# Patient Record
Sex: MALE | Race: ASIAN | NOT HISPANIC OR LATINO | Employment: UNEMPLOYED | URBAN - METROPOLITAN AREA
[De-identification: names, ages, dates, MRNs, and addresses within clinical notes are randomized per-mention and may not be internally consistent; named-entity substitution may affect disease eponyms.]

---

## 2017-05-01 ENCOUNTER — OFFICE VISIT (OUTPATIENT)
Dept: FAMILY MEDICINE | Facility: CLINIC | Age: 67
End: 2017-05-01

## 2017-05-01 VITALS
WEIGHT: 146 LBS | DIASTOLIC BLOOD PRESSURE: 83 MMHG | HEART RATE: 76 BPM | TEMPERATURE: 97.6 F | BODY MASS INDEX: 26.36 KG/M2 | SYSTOLIC BLOOD PRESSURE: 129 MMHG

## 2017-05-01 DIAGNOSIS — Z00.00 ROUTINE GENERAL MEDICAL EXAMINATION AT A HEALTH CARE FACILITY: ICD-10-CM

## 2017-05-01 DIAGNOSIS — M1A.0710 CHRONIC GOUT OF RIGHT FOOT, UNSPECIFIED CAUSE: ICD-10-CM

## 2017-05-01 DIAGNOSIS — M1A.0710 CHRONIC IDIOPATHIC GOUT INVOLVING TOE OF RIGHT FOOT WITHOUT TOPHUS: Primary | ICD-10-CM

## 2017-05-01 DIAGNOSIS — R05.9 COUGH: ICD-10-CM

## 2017-05-01 LAB
BUN SERPL-MCNC: 19.1 MG/DL (ref 7–21)
CALCIUM SERPL-MCNC: 9.3 MG/DL (ref 8.5–10.1)
CHLORIDE SERPLBLD-SCNC: 106.4 MMOL/L (ref 98–110)
CHOLEST SERPL-MCNC: 180.1 MG/DL (ref 0–200)
CHOLEST/HDLC SERPL: 3.5 {RATIO} (ref 0–5)
CO2 SERPL-SCNC: 27.3 MMOL/L (ref 20–32)
CREAT SERPL-MCNC: 1 MG/DL (ref 0.7–1.3)
GFR SERPL CREATININE-BSD FRML MDRD: 79.7 ML/MIN/1.7 M2
GLUCOSE SERPL-MCNC: 96.9 MG'DL (ref 70–99)
HBA1C MFR BLD: 5.6 % (ref 4.1–5.7)
HDLC SERPL-MCNC: 52 MG/DL
LDLC SERPL CALC-MCNC: 106 MG/DL (ref 0–129)
POTASSIUM SERPL-SCNC: 3.9 MMOL/DL (ref 3.2–4.6)
SODIUM SERPL-SCNC: 145.6 MMOL/L (ref 132–142)
TRIGL SERPL-MCNC: 111.8 MG/DL (ref 0–150)
URATE SERPL-MCNC: 6.9 MG/DL (ref 3–8)
VLDL CHOLESTEROL: 22.4 MG/DL (ref 7–32)

## 2017-05-01 RX ORDER — PREDNISONE 10 MG/1
TABLET ORAL
Qty: 21 TABLET | Refills: 0 | Status: SHIPPED | OUTPATIENT
Start: 2017-05-01 | End: 2017-05-09

## 2017-05-01 RX ORDER — ALLOPURINOL 300 MG/1
300 TABLET ORAL DAILY
Qty: 90 TABLET | Refills: 11 | Status: SHIPPED | OUTPATIENT
Start: 2017-05-01 | End: 2017-05-09

## 2017-05-01 NOTE — MR AVS SNAPSHOT
After Visit Summary   5/1/2017    Cristina Lyles    MRN: [id number]           Patient Information     Date Of Birth          1950        Visit Information        Provider Department      5/1/2017 10:00 AM Ronaldo Wu MD [facility]        Today's Diagnoses     Chronic idiopathic gout involving toe of right foot without tophus    -  1    Routine general medical examination at a health care facility        Cough        Chronic gout of right foot, unspecified cause          Care Instructions      HMONG  Noj los Tiv Thaiv gout  Gout yog ib tug mob hauv daim ntawv ntawm viki mob caj dab tshwm sim los ntawm ib tug dhau heev lawm ntawm uric acid. Qhov no yog ib tug pov tseg cov khoom ua los ntawm lub cev. Nws ua li nyob miguel hauv lub cev thiab cov ntaub ntawv muaju uas vern nyob miguel hauv cov pob qij txha, nqa miguel ib tug gout nres. Haus dej cawv thiab adam yam zaub mov yuav ua miguel muaj mob ib tug gout nres. Hauv qab no yog ib co txheej txheem miguel viki hloov koj cov viki noj haus kom pab koj tswj gout. Koj tus kws carlita mob yuav ua hauj lwm nrog koj los mus txiav txim qhov zoo tshaj plaws noj ursula viki miguel koj. Paub hais tias noj cov zaub mov yog tsuas yog ib feem ntawm viki tswj gout. Noj koj cov tshuaj raws li tus kws carlita thiab ua raws li lwm cov viki ivonne koj tus kws carlita mob tau muab miguel koj.  Cov zaub mov tsis txhob  Noj ntau yam zaub mov uas muaj purines yuav ua miguel kom cov theem ntawm uric acid nyob miguel hauv koj lub cev thiab ua miguel kom koj muaj ib tug gout nres. Adam zaum nws yuav zoo tshaj plaws kom tsis txhob no high-purine zaub mov:  Haus dej cawv (beer, liab wine). Adam zaum koj yuav tsum tau hais kom tsis txhob haus juve haus cawv ingris conklin.  Adam yam ntses (anchovies, sardines, ntses roes, herring, tuna, qwj nplais, cov ntses pas thu, scallops, trout, thiab Ramonita)  Adam yam nqaij (nqaij liab, ua nqaij, nqaij npuas kib, qaib cov txwv, cov tsiaj qus si, thiab trixie)  Kua ntsw thiab gravies ua nrog nqaij  Hloov  nqaij (xws li daim siab, ob lub raum, sweetbreads, thiab tripe)  Legumes (xws li noob taum qhuav, taum mog)  Nceb, spinach, asparagus, thiab zaub paj  Poov xab thiab cov poov xab extract tshuaj  Cov zaub mov sim  Ib txhia cov zaub mov adam zaum yuav pab tau miguel cov neeg uas muaj gout. Adam zaum koj yuav xav mus sim ntxiv ib co ntawm cov nram qab no cov khoom noj miguel koj noj:  Tsaus berries: Cov no muaj xws blueberries, blackberries, thiab txiv ntoo qab zib. Cov berries muaj tshuaj uas adam zaum yuav txo tau uric acid.  Taum paj: taum paj, uas yog tsim los ntawm cov kua, yog ib tug zoo qhov chaw ntawm cov protein. Viki tshawb fawb pom tau hais tias adam zaum nws yuav tsum yog ib tug zoo xaiv tshaj nqaij miguel cov neeg uas muaj gout.  Omega fatty acids: Cov acids uas yog nyob miguel hauv fatty ntses (xws li salmon), adam roj (xws li flax, roj txiv, los yog cov noob txiv roj), los yog ceev. Adam zaum lawv yuav pab tiv thaiv kom txhob mob vim gout.  Cov nram qab no raws li viki ivonne yog pom zoo los ntawm [employer] Association miguel cov neeg uas gout. Koj noj cov zaub mov yuav tsum tau:  High nyob miguel hauv fiber, whole grains, txiv hmab txiv ntoo, thiab zaub.  Tsis tshua muaj nyob miguel hauv protein (15% ntawm calorie ntau ntau yuav tsum tau tuaj los ntawm protein. Xaiv ntshiv qhov chaw xws li kua, nqaij ntshiv, thiab nqaij qaib).  Tsis tshua muaj nyob miguel hauv roj (tsis muaj ntau tshaj li 30% ntawm cov calories yuav tsum tau tuaj los ntawm cov jolie, nrog xwb 10% los ntawm cov tsiaj roj).    Eating to Prevent Gout  Gout is a painful form of arthritis caused by an excess of uric acid. This is a waste product made by the body. It builds up in the body and forms crystals that collect in the joints, bringing on a gout attack. Alcohol and certain foods can trigger a gout attack. Below are some guidelines for changing your diet to help you manage gout. Your healthcare provider can work with you to determine the best eating plan for you.  Know that diet is only one part of managing gout. Take your medicines as prescribed and follow the other guidelines your healthcare provider has given you.  Foods to limit  Eating too many foods containing purines may increase the levels of uric acid in your body and increase your risk for a gout attack. It may be best to limit these high-purine foods:    Alcohol (beer, red wine). You may be told to avoid alcohol completely.    Certain fish (anchovies, sardines, fish roes, herring, tuna, mussels, codfish, scallops, trout, and elma)    Certain meats (red meat, processed meat, lee, turkey, wild game, and goose)    Sauces and gravies made with meat    Organ meats (such as liver, kidneys, sweetbreads, and tripe)    Legumes (such as dried beans, peas)    Mushrooms, spinach, asparagus, and cauliflower    Yeast and yeast extract supplements  Foods to try  Some foods may be helpful for people with gout. You may want to try adding some of the following foods to your diet:    Dark berries: These include blueberries, blackberries, and cherries. These berries contain chemicals that may lower uric acid.    Tofu: Tofu, which is made from soy, is a good source of protein. Studies have shown that it may be a better choice than meat for people with gout.    Omega fatty acids: These acids are found in fatty fish (such as salmon), certain oils (such as flax, olive, or nut oils), or nuts. They may help prevent inflammation due to gout.  The following guidelines are recommended by the American Medical Association for people with gout. Your diet should be:    High in fiber, whole grains, fruits, and vegetables.    Low in protein (15% of calories should come from protein. Choose lean sources such as soy, lean meats, and poultry).    Low in fat (no more than 30% of calories should come from fat, with only 10% coming from animal fat).     2236-0410 [employer]. [address] [place] [address]. All rights  reserved. This information is not intended as a substitute for professional medical care. Always follow your healthcare professional's instructions.      Follow up with your doctor in the next few months or sooner if needed.  We will let you know the results of your lab tests by mail.          Follow-ups after your visit        Follow-up notes from your care team     Return in about 3 months (around 2017), or if symptoms worsen or fail to improve.      Who to contact     Please call your clinic at [phone] to:    Ask questions about your health    Make or cancel appointments    Discuss your medicines    Learn about your test results    Speak to your doctor   If you have compliments or concerns about an experience at your clinic, or if you wish to file a complaint, please contact [facility] Patient Relations at [phone] or email us at [email or web address]         Additional Information About Your Visit        Always Preppedhart Information     Dodonation is an electronic gateway that provides easy, online access to your medical records. With Dodonation, you can request a clinic appointment, read your test results, renew a prescription or communicate with your care team.     To sign up for Dodonation visit the website at [email or web address]   You will be asked to enter the access code listed below, as well as some personal information. Please follow the directions to create your username and password.     Your access code is: [email or web address]  Expires: 2017 11:03 AM     Your access code will  in 90 days. If you need help or a new code, please contact your [facility] or call [phone] for assistance.        Care EveryWhere ID     This is your Care EveryWhere ID. This could be used by other organizations to access your [place] medical records  [id number]        Your Vitals Were     Pulse Temperature BMI (Body Mass Index)             76  97.6  F (36.4  C) (Oral) 26.36 kg/m2          Blood Pressure from Last 3 Encounters:   05/01/17 129/83   12/26/16 103/69   06/08/16 134/75    Weight from Last 3 Encounters:   05/01/17 146 lb (66.2 kg)   12/26/16 137 lb 12.8 oz (62.5 kg)   06/08/16 142 lb (64.4 kg)              We Performed the Following     Basic Metabolic Panel ([place])     Hemoglobin A1c ([facility])     Lipid Panel ([facility])     Uric Acid ([facility])          Today's Medication Changes          These changes are accurate as of: 5/1/17 11:03 AM.  If you have any questions, ask your nurse or doctor.               Start taking these medicines.        Dose/Directions    acetaminophen-codeine 300-30 MG per tablet   Commonly known as:  TYLENOL #3   Used for:  Chronic idiopathic gout involving toe of right foot without tophus   Started by:  Ronaldo Wu MD        Dose:  1-2 tablet   Take 1-2 tablets by mouth every 4 hours as needed for pain maximum 6 tablet(s) per day   Quantity:  12 tablet   Refills:  0       predniSONE 10 MG tablet   Commonly known as:  DELTASONE   Used for:  Chronic idiopathic gout involving toe of right foot without tophus   Started by:  Ronaldo Wu MD        Take 4 tabs (40 mg) by mouth daily x 2 days, 3 tabs (30 mg) daily x 2 days, 2 tabs (20 mg) daily x 2 days, then 1 tab (10 mg) x 2 days. 0.5 tab daily x 2 days then stop.   Quantity:  21 tablet   Refills:  0         Stop taking these medicines if you haven't already. Please contact your care team if you have questions.     colchicine 0.6 MG tablet   Commonly known as:  COLCRYS   Stopped by:  Ronaldo Wu MD           guaiFENesin-dextromethorphan 100-10 MG/5ML syrup   Commonly known as:  ROBITUSSIN DM   Stopped by:  Ronaldo Wu MD                Where to get your medicines      These medications were sent to [facility] - [place], [place] - [address]  [address], [place] [address]     Phone:  [phone]     allopurinol 300 MG tablet     ranitidine 150 MG tablet         Some of these will need a paper prescription and others can be bought over the counter.  Ask your nurse if you have questions.     Bring a paper prescription for each of these medications     acetaminophen-codeine 300-30 MG per tablet    predniSONE 10 MG tablet                Primary Care Provider Office Phone # Fax #    Cintia Ortiz [phone]       [facility] [address] [place] [address]        Thank you!     Thank you for choosing [facility]  for your care. Our goal is always to provide you with excellent care. Hearing back from our patients is one way we can continue to improve our services. Please take a few minutes to complete the written survey that you may receive in the mail after your visit with us. Thank you!             Your Updated Medication List - Protect others around you: Learn how to safely use, store and throw away your medicines at www.disposemymeds.org.          This list is accurate as of: 5/1/17 11:03 AM.  Always use your most recent med list.                   Brand Name Dispense Instructions for use    acetaminophen 325 MG tablet    TYLENOL    100 tablet    Take 2 tablets (650 mg) by mouth every 6 hours as needed for mild pain Do not take more than 3000 mg in one day.       acetaminophen-codeine 300-30 MG per tablet    TYLENOL #3    12 tablet    Take 1-2 tablets by mouth every 4 hours as needed for pain maximum 6 tablet(s) per day       allopurinol 300 MG tablet    ZYLOPRIM    90 tablet    Take 1 tablet (300 mg) by mouth daily       predniSONE 10 MG tablet    DELTASONE    21 tablet    Take 4 tabs (40 mg) by mouth daily x 2 days, 3 tabs (30 mg) daily x 2 days, 2 tabs (20 mg) daily x 2 days, then 1 tab (10 mg) x 2 days. 0.5 tab daily x 2 days then stop.       ranitidine 150 MG tablet    ZANTAC    60 tablet    Take 1 tablet (150 mg) by mouth 2 times daily

## 2017-05-01 NOTE — PROGRESS NOTES
SUBJECTIVE:  This is a 66-year-old man who attends with right foot pain, which he knows to be a flare-up of his gout.  He has had gout for many years and says that he gets flareups three or four times per year.  He attended [facility] ER two days ago and says the medication he was given didn't help.  He was able to locate an old prescription of prednisone and took two of these, and this allowed him to sleep for the first time in a few days.  He otherwise feels well.  He sometimes gets pains in his knee and foot, but most commonly it is in the right great toe.  He is agreeable to having labs checked.  We discussed allopurinol.  He remembers this name and indicates that he ran out of refills and then stopped taking it, but he is agreeable to the idea of taking something on a daily basis to prevent future recurrences of gout.  With his permission, we reviewed Care Everywhere.   OBJECTIVE:   Vital signs are noted.  His blood pressure is satisfactory.  He has an acutely tender, warm, and swollen right great toe.  I Googled the medication that he believes is prednisone and verified that it is prednisone 10 mg pills.  I reviewed his records and confirmed that he doesn't have diabetes and has good renal function, but labs haven't been checked in over a year.   ASSESSMENT:   1.  Chronic gout.   2.  Acute gout flareup unresponsive to treatment.   PLAN:  It seems as if he was given a single dose of colchicine and a single dose of allopurinol in the ER, bilaterally, which is an unusual treatment.  I elected to give him a reducing course of prednisone in a fashion that he says has worked well for him in the past.  I offered to give him adjuvant pain relief for the first day or two and he is agreeable to this.  He tends to have upper GI symptoms and is taking ranitidine.  I therefore avoided use of an NSAID and we'll add this to his allergy list as a caution.  We'll check a number of labs and I'll notify him of the results.  I  went through dietary recommendations to avoid gout flareups and have sent a copy of this home with him written in the Hmong language.  He was satisfied with this plan.  He is encouraged to followup with his usual primary care physician within the next few months or sooner p.r.n.

## 2017-05-01 NOTE — NURSING NOTE
No height today, due to foot pain    I spent a total of 30 minutes interpret for this patient today.

## 2017-05-01 NOTE — PATIENT INSTRUCTIONS
HMONG  Noj los Tiv Thaiv gout  Gout yog ib tug mob hauv daim ntawv ntawm viki mob caj dab tshwm sim los ntawm ib tug dhau heev lawm ntawm uric acid. Qhov no yog ib tug pov tseg cov khoom ua los ntawm lub cev. Nws ua li nyob miguel hauv lub cev thiab cov ntaub ntawv muaju uas vern nyob miguel hauv cov pob qij txha, nqa miguel ib tug gout nres. Haus dej cawv thiab adam yam zaub mov yuav ua miguel muaj mob ib tug gout nres. Hauv qab no yog ib co txheej txheem miguel viki hloov koj cov viki noj haus kom pab koj tswj gout. Koj tus kws carlita mob yuav ua hauj lwm nrog koj los mus txiav txim qhov zoo tshaj plaws noj ursula viki miguel koj. Paub hais tias noj cov zaub mov yog tsuas yog ib feem ntawm viki tswj gout. Noj koj cov tshuaj raws li tus kws carlita thiab ua raws li lwm cov viki ivonne koj tus kws carlita mob tau muab miguel koj.  Cov zaub mov tsis txhob  Noj ntau yam zaub mov uas muaj purines yuav ua miguel kom cov theem ntawm uric acid nyob miguel hauv koj lub cev thiab ua miguel kom koj muaj ib tug gout nres. Adam zaum nws yuav zoo tshaj plaws kom tsis txhob no high-purine zaub mov:  Haus dej cawv (beer, liab wine). Adam zaum koj yuav tsum tau hais kom tsis txhob haus juve haus cawv kiag li.  Adam yam ntses (anchovies, sardines, ntses roes, herring, tuna, qwj nplais, cov ntses pas thu, scallops, trout, thiab Ramonita)  Adam yam nqaij (nqaij liab, ua nqaij, nqaij npuas kib, qaib cov txwv, cov tsiaj qus si, thiab trixie)  Kua ntsw thiab gravies ua nrog nqaij  Hloov nqaij (xws li daim siab, ob lub raum, sweetbreads, thiab tripe)  Legumes (xws li noob taum qhuav, taum mog)  Nceb, spinach, asparagus, thiab zaub paj  Poov xab thiab cov poov xab extract tshuaj  Cov zaub mov sim  Ib txhia cov zaub mov adam zaum yuav pab tau miguel cov neeg uas muaj gout. Adam zaum koj yuav xav mus sim ntxiv ib co ntawm cov nram qab no cov khoom noj miguel koj noj:  Tsaus berries: Cov no muaj xws blueberries, blackberries, thiab txiv ntoo qab zib. Cov berries muaj tshuaj uas adam zaum yuav txo tau uric  acid.  Taum paj: taum paj, uas yog tsim los ntawm cov kua, yog ib tug zoo qhov chaw ntawm cov protein. Viki tshawb fawb pom tau hais tias adam zaum nws yuav tsum yog ib tug zoo xaiv tshaj nqaij miguel cov neeg uas muaj gout.  Omega fatty acids: Cov acids uas yog nyob miguel hauv fatty ntses (xws li salmon), adam roj (xws li flax, roj txiv, los yog cov noob txiv roj), los yog ceev. Adam zaum lawv yuav pab tiv thaiv kom txhob mob vim gout.  Cov nram qab no raws li viki ivonne yog pom zoo los ntawm American [employer] Association miguel cov neeg uas gout. Koj noj cov zaub mov yuav tsum tau:  High nyob miguel hauv fiber, whole grains, txiv hmab txiv ntoo, thiab zaub.  Tsis tshua muaj nyob miguel hauv protein (15% ntawm calorie ntau ntau yuav tsum tau tuaj los ntawm protein. Xaiv ntshiv qhov chaw xws li kua, nqaij ntshiv, thiab nqaij qaib).  Tsis tshua muaj nyob miguel hauv roj (tsis muaj ntau tshaj li 30% ntawm cov calories yuav tsum tau tuaj los ntawm cov jolie, nrog xwb 10% los ntawm cov tsiaj roj).    Eating to Prevent Gout  Gout is a painful form of arthritis caused by an excess of uric acid. This is a waste product made by the body. It builds up in the body and forms crystals that collect in the joints, bringing on a gout attack. Alcohol and certain foods can trigger a gout attack. Below are some guidelines for changing your diet to help you manage gout. Your healthcare provider can work with you to determine the best eating plan for you. Know that diet is only one part of managing gout. Take your medicines as prescribed and follow the other guidelines your healthcare provider has given you.  Foods to limit  Eating too many foods containing purines may increase the levels of uric acid in your body and increase your risk for a gout attack. It may be best to limit these high-purine foods:    Alcohol (beer, red wine). You may be told to avoid alcohol completely.    Certain fish (anchovies, sardines, fish roes, herring, tuna, mussels, codfish,  scallops, trout, and elma)    Certain meats (red meat, processed meat, lee, turkey, wild game, and goose)    Sauces and gravies made with meat    Organ meats (such as liver, kidneys, sweetbreads, and tripe)    Legumes (such as dried beans, peas)    Mushrooms, spinach, asparagus, and cauliflower    Yeast and yeast extract supplements  Foods to try  Some foods may be helpful for people with gout. You may want to try adding some of the following foods to your diet:    Dark berries: These include blueberries, blackberries, and cherries. These berries contain chemicals that may lower uric acid.    Tofu: Tofu, which is made from soy, is a good source of protein. Studies have shown that it may be a better choice than meat for people with gout.    Omega fatty acids: These acids are found in fatty fish (such as salmon), certain oils (such as flax, olive, or nut oils), or nuts. They may help prevent inflammation due to gout.  The following guidelines are recommended by the American Medical Association for people with gout. Your diet should be:    High in fiber, whole grains, fruits, and vegetables.    Low in protein (15% of calories should come from protein. Choose lean sources such as soy, lean meats, and poultry).    Low in fat (no more than 30% of calories should come from fat, with only 10% coming from animal fat).     5302-0885 [employer]. [address] [place] [address]. All rights reserved. This information is not intended as a substitute for professional medical care. Always follow your healthcare professional's instructions.      Follow up with your doctor in the next few months or sooner if needed.  We will let you know the results of your lab tests by mail.

## 2017-05-09 ENCOUNTER — OFFICE VISIT (OUTPATIENT)
Dept: FAMILY MEDICINE | Facility: CLINIC | Age: 67
End: 2017-05-09

## 2017-05-09 VITALS
OXYGEN SATURATION: 100 % | HEART RATE: 69 BPM | SYSTOLIC BLOOD PRESSURE: 137 MMHG | HEIGHT: 62 IN | DIASTOLIC BLOOD PRESSURE: 81 MMHG | BODY MASS INDEX: 26.39 KG/M2 | WEIGHT: 143.4 LBS | TEMPERATURE: 97.4 F

## 2017-05-09 DIAGNOSIS — M1A.0710 CHRONIC GOUT OF RIGHT FOOT, UNSPECIFIED CAUSE: ICD-10-CM

## 2017-05-09 RX ORDER — COLCHICINE 0.6 MG/1
0.6 TABLET ORAL DAILY
Qty: 30 TABLET | Refills: 5 | Status: SHIPPED | OUTPATIENT
Start: 2017-05-09 | End: 2018-01-22

## 2017-05-09 RX ORDER — PREDNISONE 20 MG/1
TABLET ORAL
Qty: 45 TABLET | Refills: 0 | Status: SHIPPED | OUTPATIENT
Start: 2017-05-09 | End: 2017-05-26

## 2017-05-09 RX ORDER — ALLOPURINOL 300 MG/1
300 TABLET ORAL DAILY
Qty: 90 TABLET | Refills: 11 | Status: SHIPPED | OUTPATIENT
Start: 2017-05-09 | End: 2018-01-22

## 2017-05-09 NOTE — PROGRESS NOTES
"    Nursing Notes:   Kimberli Yoon CMA  5/9/2017  8:32 AM  Signed   name: Liang Ambrosio  Language: Yapta  Agency: [employer]  Phone number: [phone]    Chief Complaint   Patient presents with     Arthritis     gout in right big toe for 1 week has not gotten better     Blood pressure 137/81, pulse 69, temperature 97.4  F (36.3  C), height 5' 2.21\" (158 cm), weight 143 lb 6.4 oz (65 kg), SpO2 100 %.    SUBJECTIVE:  This is a 66-year-old gentleman, well-known to me, w/history of anxiety, depression, and gout.  He is seen today with the assistance of [facility] , J Carlos Ambrosio.  He is here for followup on his right foot pain.  He had an acute gouty attack about two weeks ago.  He was seen initially at [facility].  He was given colchicine and prednisone in a single dose.  He was seen here by Dr. Wu and was given a more prolonged course of prednisone.  Mr. Lyles endorses today that the pain in his foot is improving.  He doesn't feel that it is quite as strong as it should be.  He was initially taking four tablets of 10 mg prednisone, but he found that this wasn't helping.  He has now completed the full course as prescribed by Dr. Wu.  He now is able to walk again.  Swelling and redness have gone down, but he is still having some aching and significant pain.  He has been off of the crutches and the brace, as well as the Tylenol #3.  He describes these episodes as feeling like he develops a kind of irritation or tingling that occurs in the toe first.  Symptoms then progress toward increased pain, swelling, and discomfort.  He describes it as feeling that there are crystals forming in his toe.  When he is able to take prednisone, the symptoms improve, but without the medicine he feels that he can't do anything.  It appears today that he wasn't aware of his diagnosis of gout.  He had been told that there were different foods that he should avoid, but the list that he was given at the ER was in " "English.  He does remember that he is supposed to avoid alcohol and fatty or greasy foods.  We discussed other foods that have been difficult for him with the gout.  These include seafood, fish, and pork.  We discussed other foods that can lead to higher uric acid levels, as well as foods that can decrease them.     He is interested in starting a regular every day medicine to prevent gout.  He has been on allopurinol in the past.  Apparently, when he was on that his symptoms were much better; instead of having very regular gouty exacerbations, he had them only a couple of times a year.  He knows to take the colchicine with the allopurinol.  He would like to have prednisone available at home so he can take that at the beginning of any symptoms of gout.       We discussed his labs that were checked at last visit.  They showed a mildly elevated sodium, and normal creatinine of 1.0, which is stable.  His cholesterol showed an LDL of 106.  A1C is 5.6, with a random glucose of 96.9.  Uric acid level was 6.9.     Objective:  /81  Pulse 69  Temp 97.4  F (36.3  C)  Ht 5' 2.21\" (158 cm)  Wt 143 lb 6.4 oz (65 kg)  SpO2 100%  BMI 26.06 kg/m2   Vitals:  Vitals are reviewed and are within the normal range  Gen:  Alert, pleasant, no acute distress  Cardiac:  Regular rate and rhythm, no murmurs, rubs or gallops  Respiratory:  Lungs clear to auscultation bilaterally  Extremities:  Warm, well-perfused, pulses 2+/4, no lower extremity edema.. Some hyperpigmentaion around right great toe, but not warm or erythematous.  Moderate pain with palpation and full range of motion.      ASSESSMENT/PLAN:  A 66-year-old male w/history of gout, presenting with continued right toe pain, although it is significantly improved from his presentation last week.  We reviewed information about gout, including dietary and lifestyle changes.  We discussed the regular controller medications for gout, which for him will be allopurinol and " colchicine.  He'll take the two medications together every day to prevent symptoms.  We also discussed prednisone as a medicine that he should take if he started to have trouble with the gout.  I designed a prednisone taper for him that he can initiate at home, but I requested that he call us if he has to take the medication.  We'll have him take 20 mg for one more week NOW  to get this toe fairly calmed down.  He'll follow up with me in 2-3 weeks to recheck his uric acid level, as well as to review proper dosing of his gout medications.  I'll talk with PharmD about options for gout action plan, as he seems like a good candidate for this going forward.     I spent 30 min with patient >50% on counseling and coordination of care.          Patient Instructions   1)  Take 1 pill of the allopurinol and 1 pill of the colchicine EVERY DAY.    2)  At the start of a gout attack, take 2 pills daily for 1 week, then 1 pill daily for 1 week.    3)  NOW-  Take 1 pill daily for 5 days.      Follow up in 1-2 week and bring your medication so we can review the medications and the plan.  Will check blood levels that day.

## 2017-05-09 NOTE — NURSING NOTE
name: Liang Ambrosio  Language: Hmong  Agency: [facility]  Phone number: [phone]

## 2017-05-09 NOTE — PATIENT INSTRUCTIONS
1)  Take 1 pill of the allopurinol and 1 pill of the colchicine EVERY DAY.    2)  At the start of a gout attack, take 2 pills daily for 1 week, then 1 pill daily for 1 week.    3)  NOW-  Take 1 pill daily for 5 days.      Follow up in 1-2 week and bring your medication so we can review the medications and the plan.  Will check blood levels that day.

## 2017-05-09 NOTE — MR AVS SNAPSHOT
After Visit Summary   2017    Cristina Lyles    MRN: [id number]           Patient Information     Date Of Birth          1950        Visit Information        Provider Department      2017 8:20 AM Cintia Ortiz MD [facility]        Today's Diagnoses     Chronic gout of right foot, unspecified cause          Care Instructions    1)  Take 1 pill of the allopurinol and 1 pill of the colchicine EVERY DAY.    2)  At the start of a gout attack, take 2 pills daily for 1 week, then 1 pill daily for 1 week.    3)  NOW-  Take 1 pill daily for 5 days.      Follow up in 1-2 week and bring your medication so we can review the medications and the plan.  Will check blood levels that day.          Follow-ups after your visit        Who to contact     Please call your clinic at [phone] to:    Ask questions about your health    Make or cancel appointments    Discuss your medicines    Learn about your test results    Speak to your doctor   If you have compliments or concerns about an experience at your clinic, or if you wish to file a complaint, please contact [facility] Patient Relations at [phone] or email us at [email or web address]         Additional Information About Your Visit        MyChart Information     [employer] is an electronic gateway that provides easy, online access to your medical records. With [employer], you can request a clinic appointment, read your test results, renew a prescription or communicate with your care team.     To sign up for [employer] visit the website at [email or web address]   You will be asked to enter the access code listed below, as well as some personal information. Please follow the directions to create your username and password.     Your access code is: [email or web address]  Expires: 2017 11:03 AM     Your access code will  in 90 days. If you need help or a new code, please contact your [facility]  "Physicians Clinic or call [phone] for assistance.        Care EveryWhere ID     This is your Care EveryWhere ID. This could be used by other organizations to access your [place] medical records  [id number]        Your Vitals Were     Pulse Temperature Height Pulse Oximetry BMI (Body Mass Index)       69 97.4  F (36.3  C) 5' 2.21\" (158 cm) 100% 26.06 kg/m2        Blood Pressure from Last 3 Encounters:   05/09/17 137/81   05/01/17 129/83   12/26/16 103/69    Weight from Last 3 Encounters:   05/09/17 143 lb 6.4 oz (65 kg)   05/01/17 146 lb (66.2 kg)   12/26/16 137 lb 12.8 oz (62.5 kg)              Today, you had the following     No orders found for display         Today's Medication Changes          These changes are accurate as of: 5/9/17  9:11 AM.  If you have any questions, ask your nurse or doctor.               Start taking these medicines.        Dose/Directions    colchicine 0.6 MG tablet   Commonly known as:  COLCRYS   Used for:  Chronic gout of right foot, unspecified cause   Started by:  Cintia Ortiz MD        Dose:  0.6 mg   Take 1 tablet (0.6 mg) by mouth daily   Quantity:  30 tablet   Refills:  5         These medicines have changed or have updated prescriptions.        Dose/Directions    predniSONE 20 MG tablet   Commonly known as:  DELTASONE   This may have changed:    - medication strength  - additional instructions   Used for:  Chronic gout of right foot, unspecified cause   Changed by:  Cintia Ortiz MD        At start of gout attack, takes 2 pills daily for 1 week then 1 pill daily for 1 week.   Quantity:  45 tablet   Refills:  0         Stop taking these medicines if you haven't already. Please contact your care team if you have questions.     acetaminophen-codeine 300-30 MG per tablet   Commonly known as:  TYLENOL #3   Stopped by:  Cintia Ortiz MD                Where to get your medicines      These medications were sent to [facility] - [place], [place] - " [address]  [address] [place] [address]     Phone:  [phone]     allopurinol 300 MG tablet    colchicine 0.6 MG tablet    predniSONE 20 MG tablet                Primary Care Provider Office Phone # Fax #    Cintia Ortiz -[phone]       [facility] [address] [place] [address]        Thank you!     Thank you for choosing [facility]  for your care. Our goal is always to provide you with excellent care. Hearing back from our patients is one way we can continue to improve our services. Please take a few minutes to complete the written survey that you may receive in the mail after your visit with us. Thank you!             Your Updated Medication List - Protect others around you: Learn how to safely use, store and throw away your medicines at www.disposemymeds.org.          This list is accurate as of: 5/9/17  9:11 AM.  Always use your most recent med list.                   Brand Name Dispense Instructions for use    acetaminophen 325 MG tablet    TYLENOL    100 tablet    Take 2 tablets (650 mg) by mouth every 6 hours as needed for mild pain Do not take more than 3000 mg in one day.       allopurinol 300 MG tablet    ZYLOPRIM    90 tablet    Take 1 tablet (300 mg) by mouth daily       colchicine 0.6 MG tablet    COLCRYS    30 tablet    Take 1 tablet (0.6 mg) by mouth daily       predniSONE 20 MG tablet    DELTASONE    45 tablet    At start of gout attack, takes 2 pills daily for 1 week then 1 pill daily for 1 week.       ranitidine 150 MG tablet    ZANTAC    60 tablet    Take 1 tablet (150 mg) by mouth 2 times daily

## 2017-05-25 DIAGNOSIS — M1A.0790 IDIOPATHIC CHRONIC GOUT OF FOOT WITHOUT TOPHUS, UNSPECIFIED LATERALITY: Primary | ICD-10-CM

## 2017-05-26 ENCOUNTER — OFFICE VISIT (OUTPATIENT)
Dept: FAMILY MEDICINE | Facility: CLINIC | Age: 67
End: 2017-05-26

## 2017-05-26 ENCOUNTER — OFFICE VISIT (OUTPATIENT)
Dept: PHARMACY | Facility: CLINIC | Age: 67
End: 2017-05-26

## 2017-05-26 VITALS
BODY MASS INDEX: 26.2 KG/M2 | TEMPERATURE: 97.8 F | WEIGHT: 144.2 LBS | OXYGEN SATURATION: 98 % | SYSTOLIC BLOOD PRESSURE: 124 MMHG | DIASTOLIC BLOOD PRESSURE: 73 MMHG | HEART RATE: 81 BPM

## 2017-05-26 DIAGNOSIS — M1A.0790 IDIOPATHIC CHRONIC GOUT OF FOOT WITHOUT TOPHUS, UNSPECIFIED LATERALITY: Primary | ICD-10-CM

## 2017-05-26 DIAGNOSIS — M1A.0790 IDIOPATHIC CHRONIC GOUT OF FOOT WITHOUT TOPHUS, UNSPECIFIED LATERALITY: ICD-10-CM

## 2017-05-26 LAB
BUN SERPL-MCNC: 25.5 MG/DL (ref 7–21)
CALCIUM SERPL-MCNC: 8.6 MG/DL (ref 8.5–10.1)
CHLORIDE SERPLBLD-SCNC: 105.5 MMOL/L (ref 98–110)
CO2 SERPL-SCNC: 25.9 MMOL/L (ref 20–32)
CREAT SERPL-MCNC: 1.2 MG/DL (ref 0.7–1.3)
GFR SERPL CREATININE-BSD FRML MDRD: 64.6 ML/MIN/1.7 M2
GLUCOSE SERPL-MCNC: 168 MG'DL (ref 70–99)
POTASSIUM SERPL-SCNC: 3.7 MMOL/DL (ref 3.2–4.6)
SODIUM SERPL-SCNC: 139.7 MMOL/L (ref 132–142)
URATE SERPL-MCNC: 9.3 MG/DL (ref 3–8)

## 2017-05-26 RX ORDER — NAPROXEN 500 MG/1
500 TABLET ORAL 2 TIMES DAILY PRN
Qty: 60 TABLET | Refills: 1 | Status: SHIPPED | OUTPATIENT
Start: 2017-05-26 | End: 2018-01-22

## 2017-05-26 NOTE — PATIENT INSTRUCTIONS
allopurinol and colchicine from the pharmacy.  Take both of these medications every day in the morning.     Take ONE tablet of prednisone each morning for the next 7 days.  Then STOP.  You will have pills left in the bottle.    Take ONE tablet of naproxen up to two times per day as needed for the pain.

## 2017-05-26 NOTE — MR AVS SNAPSHOT
After Visit Summary   2017    Cristina Lyles    MRN: [id number]           Patient Information     Date Of Birth          1950        Visit Information        Provider Department      2017 9:20 AM Cintia Ortiz MD [facility]        Today's Diagnoses     Idiopathic chronic gout of foot without tophus, unspecified laterality          Care Instructions     allopurinol and colchicine from the pharmacy.  Take both of these medications every day in the morning.     Take ONE tablet of prednisone each morning for the next 7 days.  Then STOP.  You will have pills left in the bottle.    Take ONE tablet of naproxen up to two times per day as needed for the pain.          Follow-ups after your visit        Who to contact     Please call your clinic at [phone] to:    Ask questions about your health    Make or cancel appointments    Discuss your medicines    Learn about your test results    Speak to your doctor   If you have compliments or concerns about an experience at your clinic, or if you wish to file a complaint, please contact [facility] Patient Relations at [phone] or email us at [email or web address]         Additional Information About Your Visit        MyChart Information     [employer] is an electronic gateway that provides easy, online access to your medical records. With [employer], you can request a clinic appointment, read your test results, renew a prescription or communicate with your care team.     To sign up for [employer] visit the website at [email or web address]   You will be asked to enter the access code listed below, as well as some personal information. Please follow the directions to create your username and password.     Your access code is: [email or web address]  Expires: 2017 11:03 AM     Your access code will  in 90 days. If you need help or a new code, please contact your [facility]  Clinic or call [phone] for assistance.        Care EveryWhere ID     This is your Care EveryWhere ID. This could be used by other organizations to access your [place] medical records  [id number]        Your Vitals Were     Pulse Temperature Pulse Oximetry BMI (Body Mass Index)          81 97.8  F (36.6  C) (Oral) 98% 26.2 kg/m2         Blood Pressure from Last 3 Encounters:   05/26/17 124/73   05/09/17 137/81   05/01/17 129/83    Weight from Last 3 Encounters:   05/26/17 144 lb 3.2 oz (65.4 kg)   05/09/17 143 lb 6.4 oz (65 kg)   05/01/17 146 lb (66.2 kg)              We Performed the Following     Basic Metabolic Panel ([facility])     Uric Acid ([facility])          Today's Medication Changes          These changes are accurate as of: 5/26/17 10:21 AM.  If you have any questions, ask your nurse or doctor.               Start taking these medicines.        Dose/Directions    naproxen 500 MG tablet   Commonly known as:  NAPROSYN   Used for:  Idiopathic chronic gout of foot without tophus, unspecified laterality   Started by:  Cintia Ortiz MD        Dose:  500 mg   Take 1 tablet (500 mg) by mouth 2 times daily as needed for moderate pain   Quantity:  60 tablet   Refills:  1         Stop taking these medicines if you haven't already. Please contact your care team if you have questions.     acetaminophen 325 MG tablet   Commonly known as:  TYLENOL   Stopped by:  Cintia Ortiz MD           predniSONE 20 MG tablet   Commonly known as:  DELTASONE   Stopped by:  Cintia Ortiz MD                Where to get your medicines      These medications were sent to [facility] - [place], [place] - [address]  [address], [place] [address]     Phone:  [phone]     naproxen 500 MG tablet                Primary Care Provider Office Phone # Fax #    Cintia Ortiz -[phone]       [facility] [address] [place] [address]        Thank  you!     Thank you for choosing [facility]  for your care. Our goal is always to provide you with excellent care. Hearing back from our patients is one way we can continue to improve our services. Please take a few minutes to complete the written survey that you may receive in the mail after your visit with us. Thank you!             Your Updated Medication List - Protect others around you: Learn how to safely use, store and throw away your medicines at www.disposemymeds.org.          This list is accurate as of: 5/26/17 10:21 AM.  Always use your most recent med list.                   Brand Name Dispense Instructions for use    allopurinol 300 MG tablet    ZYLOPRIM    90 tablet    Take 1 tablet (300 mg) by mouth daily       colchicine 0.6 MG tablet    COLCRYS    30 tablet    Take 1 tablet (0.6 mg) by mouth daily       naproxen 500 MG tablet    NAPROSYN    60 tablet    Take 1 tablet (500 mg) by mouth 2 times daily as needed for moderate pain       ranitidine 150 MG tablet    ZANTAC    60 tablet    Take 1 tablet (150 mg) by mouth 2 times daily

## 2017-05-26 NOTE — PROGRESS NOTES
"Medication Management Note                                                       Cristina was referred by Dr. Ortiz for pharmacy services for medication therapy management    MEDICATION REVIEW:  Discussed all medication indications, dosage and effectiveness, adverse effects, and adherence with patient/caregiver.    Pt had meds with them: yes  Pt had med list with them: no  Pt was knowledgeable about meds: somewhat knowledgeable  Medications set up by: self  Medications administered by someone else (e.g., LTCF): No  Pt uses a medication box or automated dispenser: no, however a pill box was provided at this visit for future use.   Called pharmacy to obtain or clarify med list:  no  Called HHN or LTCF to obtain or clarify med list:  no    Medication Discrepancies  Medications on EMR med list that pt is NOT taking:  yes, acetaminophen   Medications pt IS taking that are NOT on EMR med list (e.g., from specialist, hospital): none  OTC meds/ dietary supplements pt taking on own that are NOT on EMR med list:  none  Dosage listed differently than how patient is taking: none  Frequency listed differently than how patient is taking: yes, prednisone 20 mg - patient taking 1 tablet in the morning and then 2 additional tablets at night when pain is \"bad\".   Duplicate medication on list (two occurrences of the same medication):  none  TOTAL NUMBER OF MEDICATION DISCREPANCIES:  2    Subjective                                                       Patient reports the following problems or concerns with their medications:  none  Patient reports the following adverse reactions to medications:  none  Pt reports missing doses:  When he does not eat. Unclear how often this happens.   Additional subjective information (e.g., reason for visit, frequency of PRNs, reasons meds were D/C ed):    Patient reports taking prednisone 1 tablet in the morning this week and then additional 2 tablets at night, sometimes, when pain in foot is really " bad.     Patient reports that he has a hard time taking medications every day because he only takes them when he eats to avoid stomach pain.    Patient reports taking ranitidine only as needed, not everyday.     Objective                                                       Patient Active Problem List   Diagnosis     Anxiety state     Constipation     Depressive disorder, not elsewhere classified     Esophageal reflux     Major depressive disorder, recurrent episode (H)     Health Care Home     Gout     Rosacea       Current Outpatient Prescriptions   Medication Sig Dispense Refill     naproxen (NAPROSYN) 500 MG tablet Take 1 tablet (500 mg) by mouth 2 times daily as needed for moderate pain 60 tablet 1     colchicine (COLCRYS) 0.6 MG tablet Take 1 tablet (0.6 mg) by mouth daily 30 tablet 5     allopurinol (ZYLOPRIM) 300 MG tablet Take 1 tablet (300 mg) by mouth daily 90 tablet 11     ranitidine (ZANTAC) 150 MG tablet Take 1 tablet (150 mg) by mouth 2 times daily 60 tablet 11       Social History   Substance Use Topics     Smoking status: Never Smoker     Smokeless tobacco: Never Used     Alcohol use No       Estimated Creatinine Clearance: 56.8 mL/min (based on Cr of 1.2).    Lab Results   Component Value Date    A1C 5.6 05/01/2017    A1C 5.4 09/29/2015     Last Basic Metabolic Panel:  Lab Results   Component Value Date    .7 05/26/2017      Lab Results   Component Value Date    POTASSIUM 3.7 05/26/2017     Lab Results   Component Value Date    CHLORIDE 105.5 05/26/2017     Lab Results   Component Value Date    GARIMA 8.6 05/26/2017     Lab Results   Component Value Date    CO2 25.9 05/26/2017     Lab Results   Component Value Date    BUN 25.5 05/26/2017     Lab Results   Component Value Date    CR 1.2 05/26/2017     Lab Results   Component Value Date    .0 05/26/2017       BP Readings from Last 3 Encounters:   05/26/17 124/73   05/09/17 137/81   05/01/17 129/83       The 10-year ASCVD risk score ([place]  KAROLYN Calles et al., 2013) is: 11%    Values used to calculate the score:      Age: 66 years      Sex: Male      Is Non- : No      Diabetic: No      Tobacco smoker: No      Systolic Blood Pressure: 124 mmHg      Is BP treated: No      HDL Cholesterol: 52 mg/dL      Total Cholesterol: 180.1 mg/dL    PHQ-9 score:    PHQ-9 SCORE 6/8/2016   Total Score -   Total Score 8       Assessment                                                       1. Mediation adherence - uncontrolled    Dr. Ortiz asked pharmacy to speak with Mr. Lyles about adherence to gout therapy. Discovered that patient only takes his medications when he eats due to stomach upset and he only takes them when he has pain and thinks he needs them.    2. Gout - uncontrolled     Mr. Lyles has gout pain that worsens at night.     Has not had allopurinol or colchicine for a couple weeks due to early fill issues at the pharmacy.     Mr. Lyles has been taking prednisone as needed for gout pain.       Plan/Recommendations                                                       Updated medication list in the EMR; deleted meds patient no longer taking and added meds patient is now taking, and changed doses where there was a dose discrepancy.    All medications were reviewed and found to be indicated, effective, safe and convenient/ affordable unless drug therapy problem(s) was/were identified, as are described below.      Completed at this visit    1. Medication adherence     Provided Mr. Lyles with a pill box to help him remember to take gout medications everyday.    Discussed upset stomach with Dr. Ortiz, we agreed this may be a result of excessive prednisone use. Plan below to discontinue use of prednisone.     Dr. Ortiz agreed to also encourage Mr. Lyles to take his medications every day.      2. Gout    Educated Mr. Lyles about the need to take allopurinol and colchicine everyday to prevent gout pain.     [facility] was contacted and they have  allopurinol and colchicine ready for  today, patient to  after visit. Encouraged patient to put medication in his pill box and take them everyday with or without pain.     Educated Mr. Lyles about prednisone - this medication should not be taken as needed for pain. Discussed taking 1 tablet daily for 1 week then stopping this medication, Dr. Ortiz agreed and planned to reinforce during her visit with Mr. Lyles.     A recommendation was made to Dr. Ortiz to start naproxen 500mg to be used as needed (up to twice daily) for pain.     Options for treatment and/or follow-up care were reviewed with the patient.  Cristina was engaged and actively involved in the decision making process, verbalized understanding of the options discussed, and was satisfied with the final plan.    Follow-up                                                       Patient should follow up in with Dr. Ortiz as needed.  Patient was provided with written instructions/medication list via AVS.     Dr. Ortiz was provided the recommendations above  in clinic today and Dr. Ortiz was available for supervision during this visit and is the authorizing prescriber for this visit through the pharmacist collaborative practice agreement.    Lucía Melissa, PharmD Student      Drug therapy problems identified  1. Med: allopurinol - Compliance - noncompliance - Resolution: Add device or process to assist use; resolved  2. Med: colchicine - Compliance - noncompliance - Resolution: Add device or process to assist use; resolved   3. Med: prednisone - Safety - taking more than prescribed - Resolution: Educate patient; recommendation to provider   4. Med: naproxen - Indication - needs additional drug therapy - Resolution: Intiate Drug; recommendation to provider    # of medical conditions addressed: 1  # of medications addressed: 6  # of medication discrepancies identified: 2  # of DTP identified: 4  Time spent: 30 minutes  Level of service: 3  (based on chronic meds)    The student acted as scribe and the encounter documented was completely performed by myself. I have reviewed and verified the student s documentation and found it to be correct and complete.  Marlen Murray, Pharm.D.

## 2017-05-26 NOTE — NURSING NOTE
name: Liang Ambrosio  Language: Hmong  Agency: [facility]  Phone number: [phone]

## 2017-05-26 NOTE — MR AVS SNAPSHOT
After Visit Summary   2017    Cristina Lyles    MRN: [id number]           Patient Information     Date Of Birth          1950        Visit Information        Provider Department      2017 10:00 AM Marlen Murray, [facility]        Today's Diagnoses     Idiopathic chronic gout of foot without tophus, unspecified laterality    -  1       Follow-ups after your visit        Your next 10 appointments already scheduled     2017  8:20 AM CDT   Return Visit with Cintia Ortiz MD   [facility] ([facility])    [address] [place] [address]   [phone]              Who to contact     Please call your clinic at [phone] to:    Ask questions about your health    Make or cancel appointments    Discuss your medicines    Learn about your test results    Speak to your doctor   If you have compliments or concerns about an experience at your clinic, or if you wish to file a complaint, please contact [facility] Patient Relations at [phone] or email us at [email or web address]         Additional Information About Your Visit        MyChart Information     [employer] is an electronic gateway that provides easy, online access to your medical records. With [employer], you can request a clinic appointment, read your test results, renew a prescription or communicate with your care team.     To sign up for [employer] visit the website at [email or web address]   You will be asked to enter the access code listed below, as well as some personal information. Please follow the directions to create your username and password.     Your access code is: [email or web address]  Expires: 2017 11:03 AM     Your access code will  in 90 days. If you need help or a new code, please contact your [facility] or call [phone] for assistance.        Care EveryWhere ID     This is your Care EveryWhere ID. This could be  used by other organizations to access your [facility] medical records  [id number]         Blood Pressure from Last 3 Encounters:   05/26/17 124/73   05/09/17 137/81   05/01/17 129/83    Weight from Last 3 Encounters:   05/26/17 144 lb 3.2 oz (65.4 kg)   05/09/17 143 lb 6.4 oz (65 kg)   05/01/17 146 lb (66.2 kg)              We Performed the Following     MTM, EA ADDITIONAL 15 MIN (80183) x 2 (= 30 min.)          Today's Medication Changes          These changes are accurate as of: 5/26/17  4:29 PM.  If you have any questions, ask your nurse or doctor.               Start taking these medicines.        Dose/Directions    naproxen 500 MG tablet   Commonly known as:  NAPROSYN   Used for:  Idiopathic chronic gout of foot without tophus, unspecified laterality   Started by:  Cintia Ortiz MD        Dose:  500 mg   Take 1 tablet (500 mg) by mouth 2 times daily as needed for moderate pain   Quantity:  60 tablet   Refills:  1         Stop taking these medicines if you haven't already. Please contact your care team if you have questions.     acetaminophen 325 MG tablet   Commonly known as:  TYLENOL   Stopped by:  Cintia Ortiz MD           predniSONE 20 MG tablet   Commonly known as:  DELTASONE   Stopped by:  Cintia Ortiz MD                Where to get your medicines      These medications were sent to [facility] - [place], [place] - [address]  [address], [place] [address]     Phone:  [phone]     naproxen 500 MG tablet                Primary Care Provider Office Phone # Fax #    Cintia Ortiz [phone]       [facility] [address] [place] [address]        Thank you!     Thank you for choosing [facility]  for your care. Our goal is always to provide you with excellent care. Hearing back from our patients is one way we can continue to improve our services. Please take a few minutes to complete the written survey that you  may receive in the mail after your visit with us. Thank you!             Your Updated Medication List - Protect others around you: Learn how to safely use, store and throw away your medicines at www.disposemymeds.org.          This list is accurate as of: 5/26/17  4:29 PM.  Always use your most recent med list.                   Brand Name Dispense Instructions for use    allopurinol 300 MG tablet    ZYLOPRIM    90 tablet    Take 1 tablet (300 mg) by mouth daily       colchicine 0.6 MG tablet    COLCRYS    30 tablet    Take 1 tablet (0.6 mg) by mouth daily       naproxen 500 MG tablet    NAPROSYN    60 tablet    Take 1 tablet (500 mg) by mouth 2 times daily as needed for moderate pain       ranitidine 150 MG tablet    ZANTAC    60 tablet    Take 1 tablet (150 mg) by mouth 2 times daily

## 2017-05-26 NOTE — PROGRESS NOTES
Cristina Lyles-    Your uric acid levels (the substance that causes gout crystals) is very high.  I hope you are able to  the medications and take them regularly.  Please make sure to bring all of your medications with you when you follow up in 2-3 weeks, so we can make sure that everything is moving in the right directions.      Cintia Ortiz    Please have [facility] staff/ call patient with results.

## 2017-05-26 NOTE — PROGRESS NOTES
Nursing Notes:   Agapito Daugherty, [facility]  5/26/2017  9:14 AM  Signed   name: Liang Ambrosio  Language: hannah  Agency: [employer]  Phone number: [phone]    Chief Complaint   Patient presents with     Arthritis     f/u gout in R foot, states that the Prednisone is not helping much. One of the his medication was not covered and the other one they do not have in stock. Pharmacy says that they will call him once they have it, but he has not heard from them.      Blood pressure 124/73, pulse 81, temperature 97.8  F (36.6  C), temperature source Oral, weight 144 lb 3.2 oz (65.4 kg), SpO2 98 %.    SUBJECTIVE:  This is a 66-year-old Hmong gentleman w/history of gout, who presents today for reevaluation.  He said he continues to have quite a bit of pain in his right great toe.  He has been unable to get the allopurinol and colchicine, so he hasn't been taking this.  He said that the pharmacy didn't have it available and he was unable to get back in to pick it up.  He also was concerned that one of the medications wasn't covered.  He was unable to  the prednisone.  Instead of taking it as we discussed at our last visit, for two weeks, he is taking pills as needed when he has toe pain.  He is taking one tablet 20 mg in the morning and he has been taking one or two during the night.  He says that the worst time for his pain is from 11:00 a.m. to 1:00 p.m.  He does find that the prednisone helps him in getting back to sleep.  He woke up this morning and his toe felt a lot better, but this is the first good day that he has had in a while.  He is taking the prednisone p.r.n. and I'm concerned about how long he has been taking it.    He has history of previous gastritis and GERD symptoms.  He still has quite a bit of confusion as to how he should take his medications.  He had some ranitidine at home that he takes on occasion when he has abdominal pain.     We were able to meet with the PharmDs, please see their note for  further details.      OBJECTIVE:   VITAL SIGNS:  Reviewed.  Blood pressure is normal.   GENERAL:  Comfortable-appearing, Hmong gentleman in no acute distress.     EXTREMITIES:  Right great toe is somewhat stiff.  There is some mild crepitus, but it is mobile.  No erythema or warmth, but there are some chronic skin changes that are present over the toe.     LABORATORY DATA:  These show a creatinine of 1.2 and a uric acid of 9.3.     ASSESSMENT/PLAN:  A 66-year-old male presenting for followup of his gout.  Still having toe pain.  He is only taking prednisone and not taking allopurinol and colchicine.  This was reinforced by the pharmacist today as well as me, but I think it is going to take a couple more visits to get him on board with the prophylactic medication  Will do naproxen for pain at night.  I reminded him that he needs to take this with food.  I recommended that he also take ranitidine at the same time.  He was given a pillbox by the PharmDs.     He'll come back in 2-3 weeks with all of his medications and we can rediscuss his schedule, as I think it will take further reinforcement to get him on the path to taking his preventative medicines every day.     Cintia Ortiz          Patient Instructions    allopurinol and colchicine from the pharmacy.  Take both of these medications every day in the morning.     Take ONE tablet of prednisone each morning for the next 7 days.  Then STOP.  You will have pills left in the bottle.    Take ONE tablet of naproxen up to two times per day as needed for the pain.

## 2017-05-27 ASSESSMENT — PATIENT HEALTH QUESTIONNAIRE - PHQ9: SUM OF ALL RESPONSES TO PHQ QUESTIONS 1-9: 0

## 2017-06-13 ENCOUNTER — OFFICE VISIT (OUTPATIENT)
Dept: FAMILY MEDICINE | Facility: CLINIC | Age: 67
End: 2017-06-13

## 2017-06-13 VITALS
SYSTOLIC BLOOD PRESSURE: 117 MMHG | HEIGHT: 62 IN | WEIGHT: 143.8 LBS | TEMPERATURE: 98 F | BODY MASS INDEX: 26.46 KG/M2 | OXYGEN SATURATION: 98 % | DIASTOLIC BLOOD PRESSURE: 71 MMHG | HEART RATE: 77 BPM

## 2017-06-13 DIAGNOSIS — M1A.0790 IDIOPATHIC CHRONIC GOUT OF FOOT WITHOUT TOPHUS, UNSPECIFIED LATERALITY: Primary | ICD-10-CM

## 2017-06-13 NOTE — PROGRESS NOTES
Medication Management Follow Up Note                                                       I am seeing Cristina Lyles for follow up from a previous visit with the pharmacy team.        Subjective                                                       Patient reports the following problems or concerns with their medications:  yes, burning stomach after taking morning medications     Summary of Problems Addressed::    Patient had medication bottles with him today to review medications    Patient reports he has been taking allopurinol and colchicine every day, he is using the pill box provided at last visit.     Mr. Lyles reports he was taking 2 naproxen 500 mg tablets every day with allopurinol and colchicine.      Patient reports only taking ranitidine when needed for burning stomach (once or twice daily as needed).     Patient had bottle of sucralfate with him today and reports taking this only as needed to coat his stomach, this medication was not with the medications at the last visit.     Objective                                                       Estimated Creatinine Clearance: 56.6 mL/min (based on Cr of 1.2).    Lab Results   Component Value Date    A1C 5.6 05/01/2017    A1C 5.4 09/29/2015     Last Basic Metabolic Panel:  Lab Results   Component Value Date    .7 05/26/2017      Lab Results   Component Value Date    POTASSIUM 3.7 05/26/2017     Lab Results   Component Value Date    CHLORIDE 105.5 05/26/2017     Lab Results   Component Value Date    GARIMA 8.6 05/26/2017     Lab Results   Component Value Date    CO2 25.9 05/26/2017     Lab Results   Component Value Date    BUN 25.5 05/26/2017     Lab Results   Component Value Date    CR 1.2 05/26/2017     Lab Results   Component Value Date    .0 05/26/2017       BP Readings from Last 3 Encounters:   06/13/17 117/71   05/26/17 124/73   05/09/17 137/81       The 10-year ASCVD risk score ([place] KAROLYN Jr, et al., 2013) is: 10%    Values used to calculate the  score:      Age: 66 years      Sex: Male      Is Non- : No      Diabetic: No      Tobacco smoker: No      Systolic Blood Pressure: 117 mmHg      Is BP treated: No      HDL Cholesterol: 52 mg/dL      Total Cholesterol: 180.1 mg/dL      Assessment                                                       Gout    Mr. Lyles has been compliant with allopurinol and colchicine therapy. He is taking these medications daily for prevention of gout pain.     Patient was having stomach pain following medications 30-40 minutes of burning feeling in his stomach, however he was taking 2 naproxen tablets with allopurinol and colchicine each day.     Mr. Lyles is using sucralfate as needed to coat his stomach and protect it from NSAID therapy.     Acid Reflux    Mr. Lyles had bottles of lansoprazole and ranitidine at the visit today. He reports using both as needed for burning stomach pain      Plan/Recommendations                                                       Gout    Continue taking allopurinol and colchicine 1 tablet of each daily.    Educated Mr. Lyles about taking naproxen only 1 tablet at a time and only as needed for acute gout pain. Discussed that he can take 1 tablet in AM then 1 tablet PM or just one tablet at night if the pain is bad.     Discussed continuation of sucralfate as needed for stomach protection     Acid Reflux    Educated Mr. Lyles to continue using ranitidine as needed for burning stomach pain and to STOP using lansoprazole as this medication does not work well when only used as needed.       Follow-up                                                       Patient should follow up as needed with Dr. Ortiz.      Dr. Ortiz was provided the recommendations above  in clinic today and Dr. Ortiz was available for supervision during this visit and is the authorizing prescriber for this visit through the pharmacist collaborative practice agreement.    Lucía Melissa, MollyD  Student      Drug therapy problems identified  1. Med: Naproxen - Safety - taking 2 tablets AM - Resolution: Educate patient; resolved      # of medical conditions addressed: 2  # of medications addressed: 6  # of medication discrepancies identified: 1  # of DTP identified: 1  Time spent: 20 minutes  Level of service: 2nc

## 2017-06-13 NOTE — PROGRESS NOTES
Preceptor attestation:  Patient seen and discussed with the pharmacy student.  History and plan were verified and discussed with the patient and student. Assessment and plan reviewed with pharmacy student and agreed upon.  Supervising physician: Cintia Ortiz  [facility]

## 2017-06-13 NOTE — MR AVS SNAPSHOT
After Visit Summary   2017    Cristina Lyles    MRN: [id number]           Patient Information     Date Of Birth          1950        Visit Information        Provider Department      2017 8:20 AM Cintia Ortiz MD [facility]        Today's Diagnoses     Idiopathic chronic gout of foot without tophus, unspecified laterality    -  1       Follow-ups after your visit        Follow-up notes from your care team     Return in about 4 weeks (around 2017).      Who to contact     Please call your clinic at [phone] to:    Ask questions about your health    Make or cancel appointments    Discuss your medicines    Learn about your test results    Speak to your doctor   If you have compliments or concerns about an experience at your clinic, or if you wish to file a complaint, please contact [facility] Patient Relations at [phone] or email us at [email or web address]         Additional Information About Your Visit        MyChart Information     Fleksyt is an electronic gateway that provides easy, online access to your medical records. With [employer] you can request a clinic appointment, read your test results, renew a prescription or communicate with your care team.     To sign up for Fleksyt visit the website at [email or web address]   You will be asked to enter the access code listed below, as well as some personal information. Please follow the directions to create your username and password.     Your access code is: [email or web address]  Expires: 2017 11:03 AM     Your access code will  in 90 days. If you need help or a new code, please contact your [facility] or call [phone] for assistance.        Care EveryWhere ID     This is your Care EveryWhere ID. This could be used by other organizations to access your [place] medical records  [id number]        Your Vitals Were     Pulse Temperature  "Height Pulse Oximetry BMI (Body Mass Index)       77 98  F (36.7  C) 5' 2\" (157.5 cm) 98% 26.3 kg/m2        Blood Pressure from Last 3 Encounters:   06/13/17 117/71   05/26/17 124/73   05/09/17 137/81    Weight from Last 3 Encounters:   06/13/17 143 lb 12.8 oz (65.2 kg)   05/26/17 144 lb 3.2 oz (65.4 kg)   05/09/17 143 lb 6.4 oz (65 kg)              Today, you had the following     No orders found for display       Primary Care Provider Office Phone # Fax #    Cintia Ortiz [phone]       [facility] [address] [place] [address]        Equal Access to Services     JAMES HUBBARD : Hadii aad ku hadasho Soomaali, waaxda luqadaha, qaybta kaalmada adeegyada, vargas lane . So [facility] [phone].    ATENCIÓN: Si habla español, tiene a austin disposición servicios gratuitos de asistencia lingüística. Llame al [phone].    We comply with applicable federal civil rights laws and [place] laws. We do not discriminate on the basis of race, color, national origin, age, disability sex, sexual orientation or gender identity.            Thank you!     Thank you for choosing [facility]  for your care. Our goal is always to provide you with excellent care. Hearing back from our patients is one way we can continue to improve our services. Please take a few minutes to complete the written survey that you may receive in the mail after your visit with us. Thank you!             Your Updated Medication List - Protect others around you: Learn how to safely use, store and throw away your medicines at www.disposemymeds.org.          This list is accurate as of: 6/13/17 11:59 PM.  Always use your most recent med list.                   Brand Name Dispense Instructions for use Diagnosis    allopurinol 300 MG tablet    ZYLOPRIM    90 tablet    Take 1 tablet (300 mg) by mouth daily    Chronic gout of right foot, unspecified cause       CARAFATE 1 GM tablet   Generic drug:  " sucralfate     40 tablet    Take 1 tablet (1 g) by mouth 2 times daily as needed        colchicine 0.6 MG tablet    COLCRYS    30 tablet    Take 1 tablet (0.6 mg) by mouth daily    Chronic gout of right foot, unspecified cause       naproxen 500 MG tablet    NAPROSYN    60 tablet    Take 1 tablet (500 mg) by mouth 2 times daily as needed for moderate pain    Idiopathic chronic gout of foot without tophus, unspecified laterality       ranitidine 150 MG tablet    ZANTAC    60 tablet    Take 1 tablet (150 mg) by mouth 2 times daily    Cough

## 2017-06-13 NOTE — PROGRESS NOTES
"    Nursing Notes:   Kimberli Yoon CMA  6/13/2017  8:33 AM  Signed   name: Debo Isabel  Language: [facility]  Agency: [employer]  Phone number: [phone]    Chief Complaint   Patient presents with     Pain     gout     Recheck Medication     Blood pressure 117/71, pulse 77, temperature 98  F (36.7  C), height 5' 2\" (157.5 cm), weight 143 lb 12.8 oz (65.2 kg), SpO2 98 %.    SUBJECTIVE:  This is a 66-year-old gentleman w/history of gout, presenting for gout followup.  Symptoms are much improved.  His toes are no longer painful.  He is currently taking allopurinol and colchicine daily, but he is also taking two pills of naproxen.  He is using a pillbox and he has this with him today.  He is no longer having pain, but now he notices pain in his upper abdomen epigastric area.  He has been taking some ranitidine and sucralfate which he had at home from a previous prescription and this has been somewhat helpful for him.  He didn't realize he isn't supposed to take naproxen every day and he has been taking it on a regular basis.  Otherwise, he feels well.  He feels comfortable using the pill box and with the plan of care.     OBJECTIVE:   VITAL SIGNS:  Reviewed.  Blood pressure is well-controlled.  Weight is stable.   GENERAL:  Comfortable-appearing, Hmong gentleman in no acute distress.   EXTREMITIES:  His left great toe is improving.  There are some chronic hyperpigmentation changes, but no erythema.  He has normal ROM.  There is mild tenderness with deep palpation over the joint space.     ASSESSMENT/PLAN:  This is a 66-year-old male presenting for followup.  We'll continue with plan of colchicine 0.6 mg daily and allopurinol 300 mg daily.  We'll use naproxen only p.r.n., and this was removed from his pillbox.  We'll use ranitidine p.r.n. abdominal pain.  He'll follow up with me in two months and we'll  check uric acid levels at that time.     Cintia Ortiz                  "

## 2017-06-21 RX ORDER — SUCRALFATE 1 G/1
1 TABLET ORAL 2 TIMES DAILY PRN
Qty: 40 TABLET | Refills: 1 | COMMUNITY
Start: 2017-06-13 | End: 2018-01-22

## 2017-12-27 ENCOUNTER — OFFICE VISIT (OUTPATIENT)
Dept: FAMILY MEDICINE | Facility: CLINIC | Age: 67
End: 2017-12-27
Payer: MEDICAID

## 2017-12-27 VITALS
SYSTOLIC BLOOD PRESSURE: 145 MMHG | OXYGEN SATURATION: 99 % | WEIGHT: 144 LBS | HEART RATE: 89 BPM | TEMPERATURE: 97.9 F | BODY MASS INDEX: 26.34 KG/M2 | DIASTOLIC BLOOD PRESSURE: 75 MMHG

## 2017-12-27 DIAGNOSIS — M10.9 ACUTE GOUTY ARTHRITIS: Primary | ICD-10-CM

## 2017-12-27 RX ORDER — PREDNISONE 20 MG/1
TABLET ORAL
Qty: 21 TABLET | Refills: 0 | Status: SHIPPED | OUTPATIENT
Start: 2017-12-27 | End: 2018-01-22

## 2017-12-27 NOTE — PATIENT INSTRUCTIONS
Take 2 pills per day for 1 week, then 1 pill per day after that.    Follow up after that in about 2 weeks to talk about preventative medications.

## 2017-12-27 NOTE — MR AVS SNAPSHOT
After Visit Summary   2017    Cristina Lyles    MRN: [id number]           Patient Information     Date Of Birth          1950        Visit Information        Provider Department      2017 2:10 PM Cintia Ortiz MD [facility]        Today's Diagnoses     Acute gouty arthritis    -  1      Care Instructions    Take 2 pills per day for 1 week, then 1 pill per day after that.    Follow up after that in about 2 weeks to talk about preventative medications.            Follow-ups after your visit        Who to contact     Please call your clinic at [phone] to:    Ask questions about your health    Make or cancel appointments    Discuss your medicines    Learn about your test results    Speak to your doctor   If you have compliments or concerns about an experience at your clinic, or if you wish to file a complaint, please contact [facility] Patient Relations at [phone] or email us at [email or web address]         Additional Information About Your Visit        MyChart Information     [employer] Display System[employer] is an electronic gateway that provides easy, online access to your medical records. With [employer] you can request a clinic appointment, read your test results, renew a prescription or communicate with your care team.     To sign up for [employer] visit the website at [email or web address]   You will be asked to enter the access code listed below, as well as some personal information. Please follow the directions to create your username and password.     Your access code is: [email or web address]  Expires: 3/27/2018  3:50 PM     Your access code will  in 90 days. If you need help or a new code, please contact your [facility] or call [phone] for assistance.        Care EveryWhere ID     This is your Care EveryWhere ID. This could be used by other organizations to access your [place] medical records  [id number]         Your Vitals Were     Pulse Temperature Pulse Oximetry BMI (Body Mass Index)          89 97.9  F (36.6  C) 99% 26.34 kg/m2         Blood Pressure from Last 3 Encounters:   12/27/17 145/75   06/13/17 117/71   05/26/17 124/73    Weight from Last 3 Encounters:   12/27/17 144 lb (65.3 kg)   06/13/17 143 lb 12.8 oz (65.2 kg)   05/26/17 144 lb 3.2 oz (65.4 kg)              Today, you had the following     No orders found for display         Today's Medication Changes          These changes are accurate as of: 12/27/17  3:50 PM.  If you have any questions, ask your nurse or doctor.               Start taking these medicines.        Dose/Directions    predniSONE 20 MG tablet   Commonly known as:  DELTASONE   Used for:  Acute gouty arthritis   Started by:  Cintia Ortiz MD        Take 2 pills daily for 1 week, then 1 pill daily for 1 week.   Quantity:  21 tablet   Refills:  0            Where to get your medicines      These medications were sent to [facility] - [place], [place] - [address] [place] [address] Pkw[address]  [address] [facility] [address], [place] [address]     Phone:  [phone]     predniSONE 20 MG tablet                Primary Care Provider Office Phone # Fax #    Cintia Ortiz [phone]       [facility] [address] [place] [address]        Equal Access to Services     JAMES HUBBARD AH: Hadii chilango gresham hadasho Soomaali, waaxda luqadaha, qaybta kaalmada adeegyada, waxay [address] . So [facility] [phone].    ATENCIÓN: Si habla español, tiene a austin disposición servicios gratuitos de asistencia lingüística. Llame al [phone].    We comply with applicable federal civil rights laws and [place] laws. We do not discriminate on the basis of race, color, national origin, age, disability, sex, sexual orientation, or gender identity.            Thank you!     Thank you for choosing [facility]  for your care. Our goal is always to provide you with excellent  care. Hearing back from our patients is one way we can continue to improve our services. Please take a few minutes to complete the written survey that you may receive in the mail after your visit with us. Thank you!             Your Updated Medication List - Protect others around you: Learn how to safely use, store and throw away your medicines at www.disposemymeds.org.          This list is accurate as of: 12/27/17  3:50 PM.  Always use your most recent med list.                   Brand Name Dispense Instructions for use Diagnosis    allopurinol 300 MG tablet    ZYLOPRIM    90 tablet    Take 1 tablet (300 mg) by mouth daily    Chronic gout of right foot, unspecified cause       CARAFATE 1 GM tablet   Generic drug:  sucralfate     40 tablet    Take 1 tablet (1 g) by mouth 2 times daily as needed        colchicine 0.6 MG tablet    COLCRYS    30 tablet    Take 1 tablet (0.6 mg) by mouth daily    Chronic gout of right foot, unspecified cause       naproxen 500 MG tablet    NAPROSYN    60 tablet    Take 1 tablet (500 mg) by mouth 2 times daily as needed for moderate pain    Idiopathic chronic gout of foot without tophus, unspecified laterality       predniSONE 20 MG tablet    DELTASONE    21 tablet    Take 2 pills daily for 1 week, then 1 pill daily for 1 week.    Acute gouty arthritis       ranitidine 150 MG tablet    ZANTAC    60 tablet    Take 1 tablet (150 mg) by mouth 2 times daily    Cough

## 2017-12-28 NOTE — PROGRESS NOTES
Nursing Notes:   YoonKimberli CMA  12/27/2017  3:13 PM  Signed  Mar[facility] phone      SUBJECTIVE  Cristina Lyles is a 67 year old male with past medical history significant for    Patient Active Problem List   Diagnosis     Anxiety state     Constipation     Depressive disorder, not elsewhere classified     Esophageal reflux     Major depressive disorder, recurrent episode (H)     Health Care Home     Gout     Rosacea     Others present at the visit:  None.  Rufina  over the phone through language line.      Presents for   Chief Complaint   Patient presents with     Knee Pain     was seen in [facility] ED for knee pain      Patient is here for complaints of right knee pain.  Pain started on the Friday before Purnima.  Was so severe that he was seen in the ER for this.  I was able to review these records through care everywhere.  Was thought to have gout and given a prescription for colchicine, as well as for Vicodin.  Unfortunately, he was unable to  the colchicine because the refill was too early, as he takes this regularly for prophylaxis.  He has tried taking the Vicodin but it only improves his pain temporarily for 1-2 hours.  He has long-standing gout with elevated uric acid level.  Shares that he has been taking 2 medications regularly.  When he thinks is twice daily.  The other is once daily.  May be colchicine, maybe naproxen, may be ranitidine.  He cannot describe the pills for me has purple small pills but does not know the name.  Does not have these medications with him.  The pain started suddenly in the right knee.  It became swollen and warm.  No recent injuries.  Has been eating differently because of the holidays.  Pain is been so bad that he is having difficulty walking.  He has not had gout in his knees in the past.  Typically it has been in his feet.  No recent trauma.    I offered corticosteroid injection into the knee as well as oral prednisone.  Had difficulty conveying  the concept of the intra-joint injection, so we will go with the oral prednisone today.    OBJECTIVE:  Vitals: /75  Pulse 89  Temp 97.9  F (36.6  C)  Wt 144 lb (65.3 kg)  SpO2 99%  BMI 26.34 kg/m2  BMI= Body mass index is 26.34 kg/(m^2).  Vitals:  Vitals are reviewed.  Blood pressure mildly elevated, likely secondary to pain.  Gen:  Alert, pleasant, mild distress.  Gait is slow.  Cardiac:  Regular rate and rhythm, no murmurs, rubs or gallops  Respiratory:  Lungs clear to auscultation bilaterally  Extremities:  Warm, well-perfused, pulses 2+/4, no lower extremity edema.  Right knee with some palpable effusion, tenderness bilateral joint line, knee is not particularly warm, but again there is fluid present.  He is able to fully extend the leg but only if he moves very slowly.  Limited full flexion of the knee.    ASSESSMENT AND PLAN:      Cristina was seen today for knee pain.  This is likely an acute gouty flare.  Unclear if he has been taking the allopurinol and naproxen or colchicine regularly.  Unable to access colchicine per insurance until 18 January.  Will prescribe prednisone as this has been effective in the past and he does have some underlying chronic kidney disease.  We will do 40 mg per day for 1 week then 20 mg per day for another week.  If pain is not significantly better by the end of the week, he will schedule follow-up visit.  Otherwise would like to see him in 2-3 weeks regardless to recheck uric acid, restart allopurinol if needed, and review his gout treatment plan.  This was challenging today given the lack of in-person interpretation.    Diagnoses and all orders for this visit:    Acute gouty arthritis  -     predniSONE (DELTASONE) 20 MG tablet; Take 2 pills daily for 1 week, then 1 pill daily for 1 week.    Patient Instructions   Take 2 pills per day for 1 week, then 1 pill per day after that.    Follow up after that in about 2 weeks to talk about preventative medications.        Follow  up in 2-3 weeks, For recheck uric acid level and discuss gout action plan.    Cintia Ortiz

## 2018-01-22 ENCOUNTER — OFFICE VISIT (OUTPATIENT)
Dept: FAMILY MEDICINE | Facility: CLINIC | Age: 68
End: 2018-01-22
Payer: MEDICAID

## 2018-01-22 VITALS
SYSTOLIC BLOOD PRESSURE: 114 MMHG | TEMPERATURE: 98.1 F | DIASTOLIC BLOOD PRESSURE: 76 MMHG | WEIGHT: 146.2 LBS | BODY MASS INDEX: 26.74 KG/M2 | HEART RATE: 79 BPM

## 2018-01-22 DIAGNOSIS — Z00.00 PREVENTATIVE HEALTH CARE: ICD-10-CM

## 2018-01-22 DIAGNOSIS — R53.82 CHRONIC FATIGUE: ICD-10-CM

## 2018-01-22 DIAGNOSIS — K21.9 GASTROESOPHAGEAL REFLUX DISEASE WITHOUT ESOPHAGITIS: Primary | ICD-10-CM

## 2018-01-22 DIAGNOSIS — N18.30 CKD (CHRONIC KIDNEY DISEASE) STAGE 3, GFR 30-59 ML/MIN (H): ICD-10-CM

## 2018-01-22 DIAGNOSIS — M10.9 ACUTE GOUTY ARTHRITIS: ICD-10-CM

## 2018-01-22 DIAGNOSIS — M1A.0710 CHRONIC GOUT OF RIGHT FOOT, UNSPECIFIED CAUSE: ICD-10-CM

## 2018-01-22 LAB
BASOPHILS # BLD AUTO: 0 THOU/UL (ref 0–0.2)
BASOPHILS NFR BLD AUTO: 0 % (ref 0–2)
BUN SERPL-MCNC: 16.4 MG/DL (ref 7–21)
CALCIUM SERPL-MCNC: 8.8 MG/DL (ref 8.5–10.1)
CHLORIDE SERPLBLD-SCNC: 107.5 MMOL/L (ref 98–110)
CO2 SERPL-SCNC: 29.8 MMOL/L (ref 20–32)
CREAT SERPL-MCNC: 1.1 MG/DL (ref 0.7–1.3)
EOSINOPHIL # BLD AUTO: 0.1 THOU/UL (ref 0–0.4)
EOSINOPHIL NFR BLD AUTO: 1 % (ref 0–6)
ERYTHROCYTE [DISTWIDTH] IN BLOOD BY AUTOMATED COUNT: 14.6 % (ref 11–14.5)
GFR SERPL CREATININE-BSD FRML MDRD: 71.2 ML/MIN/1.7 M2
GLUCOSE SERPL-MCNC: 103.5 MG'DL (ref 70–99)
HCT VFR BLD AUTO: 52.3 % (ref 40–54)
HCV AB SER QL: NEGATIVE
HGB BLD-MCNC: 16.9 G/DL (ref 14–18)
LYMPHOCYTES # BLD AUTO: 1.3 THOU/UL (ref 0.8–4.4)
LYMPHOCYTES NFR BLD AUTO: 24 % (ref 20–40)
MCH RBC QN AUTO: 28.6 PG (ref 27–34)
MCHC RBC AUTO-ENTMCNC: 32.3 G/DL (ref 32–36)
MCV RBC AUTO: 89 FL (ref 80–100)
MONOCYTES # BLD AUTO: 0.4 THOU/UL (ref 0–0.9)
MONOCYTES NFR BLD AUTO: 7 % (ref 2–10)
NEUTROPHILS # BLD AUTO: 3.8 THOU/UL (ref 2–7.7)
NEUTROPHILS NFR BLD AUTO: 68 % (ref 50–70)
PLATELET # BLD AUTO: 161 THOU/UL (ref 140–440)
PMV BLD AUTO: 9.1 FL (ref 8.5–12.5)
POTASSIUM SERPL-SCNC: 4.2 MMOL/DL (ref 3.2–4.6)
RBC # BLD AUTO: 5.91 MILL/UL (ref 4.4–6.2)
SODIUM SERPL-SCNC: 143.7 MMOL/L (ref 132–142)
TSH SERPL DL<=0.05 MIU/L-ACNC: 0.98 UIU/ML (ref 0.3–5)
URATE SERPL-MCNC: 7.6 MG/DL (ref 3–8)
WBC # BLD AUTO: 5.6 THOU/UL (ref 4–11)

## 2018-01-22 RX ORDER — PREDNISONE 20 MG/1
TABLET ORAL
Qty: 21 TABLET | Refills: 0 | Status: SHIPPED | OUTPATIENT
Start: 2018-01-22 | End: 2018-02-23

## 2018-01-22 RX ORDER — OMEPRAZOLE 40 MG/1
40 CAPSULE, DELAYED RELEASE ORAL DAILY
Qty: 90 CAPSULE | Refills: 1 | Status: SHIPPED | OUTPATIENT
Start: 2018-01-22 | End: 2018-02-23

## 2018-01-22 RX ORDER — COLCHICINE 0.6 MG/1
0.6 TABLET ORAL DAILY
Qty: 30 TABLET | Refills: 5 | Status: SHIPPED | OUTPATIENT
Start: 2018-01-22 | End: 2018-11-15

## 2018-01-22 RX ORDER — ALLOPURINOL 300 MG/1
300 TABLET ORAL DAILY
Qty: 90 TABLET | Refills: 11 | Status: SHIPPED | OUTPATIENT
Start: 2018-01-22 | End: 2019-03-15

## 2018-01-22 NOTE — NURSING NOTE
name: Paulette Hubbard  Language: Zulayong  Agency: [facility]  Phone number: [phone]

## 2018-01-22 NOTE — LETTER
January 24, 2018      Cristina Lyles  [address]  [facility] [address]    Please see below for your test results.    Resulted Orders   Uric Acid ([facility])   Result Value Ref Range    Uric Acid 7.6 3.0 - 8.0 mg/dL    Narrative    Test performed by:  [facility] [address] [place] [address]   Basic Metabolic Panel ([place])   Result Value Ref Range    Urea Nitrogen 16.4 7.0 - 21.0 mg/dL    Calcium 8.8 8.5 - 10.1 mg/dL    Chloride 107.5 98.0 - 110.0 mmol/L    Carbon Dioxide 29.8 20.0 - 32.0 mmol/L    Creatinine 1.1 0.7 - 1.3 mg/dL    Glucose 103.5 (H) 70.0 - 99.0 mg'dL    Potassium 4.2 3.2 - 4.6 mmol/dL    Sodium 143.7 (H) 132.0 - 142.0 mmol/L    GFR Estimate 71.2 >60.0 mL/min/1.7 m2    GFR Estimate If Black 86.1 >60.0 mL/min/1.7 m2   Thyroid [place] ([facility])   Result Value Ref Range    TSH 0.98 0.30 - 5.00 uIU/mL    Narrative    Test performed by:  [facility]  [address] [place] [address]   Hepatitis C Antibody ([facility])   Result Value Ref Range    Hepatitis C Antibody Screen Negative Negative    Narrative    Test performed by:  [facility] [address] [place] [address]   CBC w/ Diff and Plt ([facility])   Result Value Ref Range    WBC 5.6 4.0 - 11.0 thou/uL    RBC 5.91 4.40 - 6.20 mill/uL    Hemoglobin 16.9 14.0 - 18.0 g/dL    Hematocrit 52.3 40.0 - 54.0 %    MCV 89 80 - 100 fL    MCH 28.6 27.0 - 34.0 pg    MCHC 32.3 32.0 - 36.0 g/dL    RDW 14.6 (H) 11.0 - 14.5 %    Platelets 161 140 - 440 thou/uL    Mean Platelet Volume 9.1 8.5 - 12.5 fL    % Neutrophils 68 50 - 70 %    % Lymphocytes 24 20 - 40 %    % Monocytes 7 2 - 10 %    % Eosinophils 1 0 - 6 %    % Basophils 0 0 - 2 %    Neutrophils (Absolute) 3.8 2.0 - 7.7 thou/uL    Lymphs (Absolute) 1.3 0.8 - 4.4 thou/uL    Monocytes(Absolute) 0.4 0.0 - 0.9 thou/uL    Eos (Absolute) 0.1 0.0 - 0.4 thou/uL    Baso (Absolute) 0.0 0.0 - 0.2 thou/uL    Narrative    Test performed by:  [facility]  [address] [place] [address]           Cristina Lyles-    Here is a copy of your lab results.  Your hemoglobin is normal, your thyroid level is normal, and your uric acid level is improving.  Your sodium is a little high.  This can be a sign of dehydration.  Make sure you are getting enough fluids, as this will also help keep the gout levels down.  We'll discuss this more at your follow up visit.  Please call the clinic at [phone] if you have any questions.      Cintia Ortiz

## 2018-01-22 NOTE — PATIENT INSTRUCTIONS
Take the following medications EVERYDAY:    Colchicine  Allopurinol  Omeprazole (stomach medicine)    For the next 2 week  1 week:  2 pills per day  1 week:  1 pills per day    Call me if you get a flare of the gout.      Stop in lab for blood tests    Follow up in 3-5 weeks.

## 2018-01-22 NOTE — PROGRESS NOTES
Nursing Notes:   Lesly Garcia CMA  1/22/2018  8:40 AM  Signed   name: Paulette Hubbard  Language: Zulayong  Agency: [employer]  Phone number: [phone]      KASHIF Lyles is a 67 year old male with past medical history significant for    Patient Active Problem List   Diagnosis     Anxiety state     Constipation     Depressive disorder, not elsewhere classified     Esophageal reflux     Major depressive disorder, recurrent episode (H)     Health Care Home     Gout     Rosacea     Others present at the visit:  Paulette Hubbard    Presents for   Chief Complaint   Patient presents with     Arthritis     Pt is here to follow up on his gout in his knees.  He is also complaining of it bothering his feet as well.     Medication Reconciliation     Complete.      Here to follow-up on knee and leg pain.  Did have improvement in his symptoms with taking the prednisone.  Currently he reports taking all 3 medications, the prednisone, allopurinol, and colchicine.  Has only 1 pill left of the prednisone, so I suspect he is not really been taking this regularly.  Likes to have an extra pill in case the pain gets really bad.  Would like a refill on the prednisone today.  Is still having some pain.  Describes 3 out of 10 pain with walking in his right knee.  Gets 5 out of 10 pain in his right toe which he describes as a throbbing pain.  This is where he has had previous gout attacks.  Is also complaining of 3 out of 10 pain in his left upper foot around the ankle region.  He feels like the swelling is gone down.  The knee is less red but there is still some swelling.  He also feels like his knee and leg are more itchy than normal.  He has been trying to modify his diet to avoid foods that trigger his gout.  Thinks that maybe been boots and salty foods have triggered things.    Additionally he is feeling more weak.  Has less energy.  Notices what he describes as tremors inside his muscles.  Unable to move  around as faster as far.  No specific location for the fatigue.  He endorses eating and drinking well.  Has been having some abdominal pain.  This is located in the left upper quadrant.  Has been worse since taking the prednisone.  No nausea or vomiting.  No hemoptysis or hematochezia.  No changes in his stool color.  No constipation or diarrhea.  Does not appear that he is taking a H2 blocker or PPI currently.  Does have a history of previous bleeding ulcer.  He also has history of depression.  Does not feel like he is depressed today.  Just feeling more fatigued from other concerns.    OBJECTIVE:  Vitals: /76 (BP Location: Left arm, Patient Position: Sitting, Cuff Size: Adult Regular)  Pulse 79  Temp 98.1  F (36.7  C) (Oral)  Wt 146 lb 3.2 oz (66.3 kg)  BMI 26.74 kg/m2  BMI= Body mass index is 26.74 kg/(m^2).  Vitals:  Vitals are reviewed and are within the normal range  Gen:  Alert, pleasant, no acute distress  HEENT: Throat is clear.  No sinus tenderness.  Panic membranes are normal.  No anterior cervical lymphadenopathy.  Thyroid is normal with palpation.  Cardiac:  Regular rate and rhythm, no murmurs, rubs or gallops.  Non-tachycardic.  Pulses are strong.  Respiratory:  Lungs clear to auscultation bilaterally  Abdomen:  Soft, non-distended, bowel sounds positive, mild periumbilical tenderness.  No left upper quadrant tenderness.  No CVA tenderness.  No rebound or guarding.  Extremities:  Warm, well-perfused, pulses 2+/4, no lower extremity edema.  Skin is dry.  He still has a palpable effusion over his right knee.  No erythema or warmth.  Normal range of motion.  Gait is normal.  Has pain with palpation over his MTP joint in the right great toe.  Some hyperpigmentation but no visible tophi.  Limited range of motion.  Has also mild pain with palpation over the anterior portion of the left foot.  Again no erythema or significant swelling.    Results for orders placed or performed in visit on 01/22/18    Uric Acid ([facility])   Result Value Ref Range    Uric Acid 7.6 3.0 - 8.0 mg/dL    Narrative    Test performed by:  [facility] [address] [place] [address]   Basic Metabolic Panel ([facility])   Result Value Ref Range    Urea Nitrogen 16.4 7.0 - 21.0 mg/dL    Calcium 8.8 8.5 - 10.1 mg/dL    Chloride 107.5 98.0 - 110.0 mmol/L    Carbon Dioxide 29.8 20.0 - 32.0 mmol/L    Creatinine 1.1 0.7 - 1.3 mg/dL    Glucose 103.5 (H) 70.0 - 99.0 mg'dL    Potassium 4.2 3.2 - 4.6 mmol/dL    Sodium 143.7 (H) 132.0 - 142.0 mmol/L    GFR Estimate 71.2 >60.0 mL/min/1.7 m2    GFR Estimate If Black 86.1 >60.0 mL/min/1.7 m2   Thyroid [place] ([facility])   Result Value Ref Range    TSH 0.98 0.30 - 5.00 uIU/mL    Narrative    Test performed by:  [facility] [address] [place] [address]   Hepatitis C Antibody ([facility])   Result Value Ref Range    Hepatitis C Antibody Screen Negative Negative    Narrative    Test performed by:  [facility] [address] [place] [address]   CBC w/ Diff and Plt ([facility])   Result Value Ref Range    WBC 5.6 4.0 - 11.0 thou/uL    RBC 5.91 4.40 - 6.20 mill/uL    Hemoglobin 16.9 14.0 - 18.0 g/dL    Hematocrit 52.3 40.0 - 54.0 %    MCV 89 80 - 100 fL    MCH 28.6 27.0 - 34.0 pg    MCHC 32.3 32.0 - 36.0 g/dL    RDW 14.6 (H) 11.0 - 14.5 %    Platelets 161 140 - 440 thou/uL    Mean Platelet Volume 9.1 8.5 - 12.5 fL    % Neutrophils 68 50 - 70 %    % Lymphocytes 24 20 - 40 %    % Monocytes 7 2 - 10 %    % Eosinophils 1 0 - 6 %    % Basophils 0 0 - 2 %    Neutrophils (Absolute) 3.8 2.0 - 7.7 thou/uL    Lymphs (Absolute) 1.3 0.8 - 4.4 thou/uL    Monocytes(Absolute) 0.4 0.0 - 0.9 thou/uL    Eos (Absolute) 0.1 0.0 - 0.4 thou/uL    Baso (Absolute) 0.0 0.0 - 0.2 thou/uL    Narrative    Test performed by:  [facility]  [address] [place] [address]           ASSESSMENT AND PLAN:      Cristina was seen today for arthritis and medication  reconciliation.  He continues to experience symptoms most consistent with gout.  Does get improvement with the prednisone.  We discussed the risks of being on this long-term and when he has refills he tends to refill without coming in for appropriate monitoring of his uric acid levels.  Is now taking the allopurinol and the colchicine as well.  Sent in refills for these medications.  Discussed the importance of continuing to take them every day.  He has underlying chronic kidney disease so we will recheck creatinine today.  Also appears to be having increased heartburn symptoms while on the prednisone.  Has a history of previous bleeding ulcer, so will prescribe omeprazole.  Ideally will stop this medication when he stops taking the prednisone.    Also having vague symptoms of fatigue.  Will check a thyroid as well as a CMP and a CBC today.  I am concerned about a small gastric bleed, but hemoglobin will help clarify this.  Did not have time to get a full history about his fatigue, so we will discuss this with him at his next visit.    Diagnoses and all orders for this visit:    Gastroesophageal reflux disease without esophagitis  -     omeprazole (PRILOSEC) 40 MG capsule; Take 1 capsule (40 mg) by mouth daily Take 30-60 minutes before a meal.    Acute gouty arthritis  -     predniSONE (DELTASONE) 20 MG tablet; Take 2 pills daily for 1 week, then 1 pill daily for 1 week.  -     Uric Acid ([facility])  -     Basic Metabolic Panel ([facility])    Chronic gout of right foot, unspecified cause  -     colchicine (COLCRYS) 0.6 MG tablet; Take 1 tablet (0.6 mg) by mouth daily  -     allopurinol (ZYLOPRIM) 300 MG tablet; Take 1 tablet (300 mg) by mouth daily    CKD (chronic kidney disease) stage 3, GFR 30-59 ml/min  -     colchicine (COLCRYS) 0.6 MG tablet; Take 1 tablet (0.6 mg) by mouth daily  -     Basic Metabolic Panel ([facility])    Chronic fatigue  -     Cancel: CBC with Diff Plt ([facility])  -     Thyroid [place]  ([facility])  -     CBC w/ Diff and Plt ([facility])    Preventative health care  -     [facility] C Antibody ([facility])        Patient Instructions   Take the following medications EVERYDAY:    Colchicine  Allopurinol  Omeprazole (stomach medicine)    For the next 2 week  1 week:  2 pills per day  1 week:  1 pills per day    Call me if you get a flare of the gout.      Stop in lab for blood tests    Follow up in 3-5 weeks.        Cintia Ortiz

## 2018-01-22 NOTE — MR AVS SNAPSHOT
After Visit Summary   1/22/2018    Cristina Lyles    MRN: [id number]           Patient Information     Date Of Birth          1950        Visit Information        Provider Department      1/22/2018 8:20 AM Cintia Ortiz MD [facility]        Today's Diagnoses     Gastroesophageal reflux disease without esophagitis    -  1    Acute gouty arthritis        Chronic gout of right foot, unspecified cause        CKD (chronic kidney disease) stage 3, GFR 30-59 ml/min        Chronic fatigue        Preventative health care          Care Instructions    Take the following medications EVERYDAY:    Colchicine  Allopurinol  Omeprazole (stomach medicine)    For the next 2 week  1 week:  2 pills per day  1 week:  1 pills per day    Call me if you get a flare of the gout.      Stop in lab for blood tests    Follow up in 3-5 weeks.            Follow-ups after your visit        Who to contact     Please call your clinic at [phone] to:    Ask questions about your health    Make or cancel appointments    Discuss your medicines    Learn about your test results    Speak to your doctor   If you have compliments or concerns about an experience at your clinic, or if you wish to file a complaint, please contact [facility] Patient Relations at [phone] or email us at [email or web address]         Additional Information About Your Visit        MyChart Information     [employer] is an electronic gateway that provides easy, online access to your medical records. With InflaRx, you can request a clinic appointment, read your test results, renew a prescription or communicate with your care team.     To sign up for [employer] visit the website at [email or web address]   You will be asked to enter the access code listed below, as well as some personal information. Please follow the directions to create your username and password.     Your access code is: [email or web address]  Expires:  3/27/2018  3:50 PM     Your access code will  in 90 days. If you need help or a new code, please contact your [facility] or call [phone] for assistance.        Care EveryWhere ID     This is your Care EveryWhere ID. This could be used by other organizations to access your [facility] medical records  [id number]        Your Vitals Were     Pulse Temperature BMI (Body Mass Index)             79 98.1  F (36.7  C) (Oral) 26.74 kg/m2          Blood Pressure from Last 3 Encounters:   18 114/76   17 145/75   17 117/71    Weight from Last 3 Encounters:   18 146 lb 3.2 oz (66.3 kg)   17 144 lb (65.3 kg)   17 143 lb 12.8 oz (65.2 kg)              We Performed the Following     Basic Metabolic Panel ([facility])     CBC with Diff Plt ([facility])     Hepatitis C Antibody ([facility])     Thyroid [place] ([facility])     Uric Acid ([facility])          Today's Medication Changes          These changes are accurate as of: 18  9:10 AM.  If you have any questions, ask your nurse or doctor.               Start taking these medicines.        Dose/Directions    omeprazole 40 MG capsule   Commonly known as:  priLOSEC   Used for:  Gastroesophageal reflux disease without esophagitis   Started by:  Cintia Ortiz MD        Dose:  40 mg   Take 1 capsule (40 mg) by mouth daily Take 30-60 minutes before a meal.   Quantity:  90 capsule   Refills:  1         Stop taking these medicines if you haven't already. Please contact your care team if you have questions.     CARAFATE 1 GM tablet   Generic drug:  sucralfate   Stopped by:  Cintia Ortiz MD           naproxen 500 MG tablet   Commonly known as:  NAPROSYN   Stopped by:  Cintia Ortiz MD           ranitidine 150 MG tablet   Commonly known as:  ZANTAC   Stopped by:  Cintia Ortiz MD                Where to get your medicines      These medications were sent to [facility] - [place], [place] - [address]  [address], [place] [address]     Phone:  [phone]     allopurinol 300 MG tablet    colchicine 0.6 MG tablet    omeprazole 40 MG capsule    predniSONE 20 MG tablet                Primary Care Provider Office Phone # Fax #    Cintia Ortiz [phone]       [facility] [address] [place] [address]        Equal Access to Services     JAMES HUBBARD : Hadii aad ku hadasho Soomaali, waaxda luqadaha, qaybta kaalmada adeegyada, waxay idiin hayaan adeeg taylorhomeramanda lane . So [facility] [phone].    ATENCIÓN: Si ally dillon, tiene a austin disposición servicios gratuitos de asistencia lingüística. Ll[facility] [phone].    We comply with applicable federal civil rights laws and [place] laws. We do not discriminate on the basis of race, color, national origin, age, disability, sex, sexual orientation, or gender identity.            Thank you!     Thank you for choosing [facility]  for your care. Our goal is always to provide you with excellent care. Hearing back from our patients is one way we can continue to improve our services. Please take a few minutes to complete the written survey that you may receive in the mail after your visit with us. Thank you!             Your Updated Medication List - Protect others around you: Learn how to safely use, store and throw away your medicines at www.disposemymeds.org.          This list is accurate as of: 1/22/18  9:10 AM.  Always use your most recent med list.                   Brand Name Dispense Instructions for use Diagnosis    allopurinol 300 MG tablet    ZYLOPRIM    90 tablet    Take 1 tablet (300 mg) by mouth daily    Chronic gout of right foot, unspecified cause       colchicine 0.6 MG tablet    COLCRYS    30 tablet    Take 1 tablet (0.6 mg) by mouth daily    Chronic gout of right foot, unspecified cause, CKD (chronic kidney disease) stage 3, GFR 30-59 ml/min       omeprazole 40 MG  capsule    priLOSEC    90 capsule    Take 1 capsule (40 mg) by mouth daily Take 30-60 minutes before a meal.    Gastroesophageal reflux disease without esophagitis       predniSONE 20 MG tablet    DELTASONE    21 tablet    Take 2 pills daily for 1 week, then 1 pill daily for 1 week.    Acute gouty arthritis

## 2018-01-24 NOTE — PROGRESS NOTES
Cristina Lyles-    Here is a copy of your lab results.  Your hemoglobin is normal, your thyroid level is normal, and your uric acid level is improving.  Your sodium is a little high.  This can be a sign of dehydration.  Make sure you are getting enough fluids, as this will also help keep the gout levels down.  We'll discuss this more at your follow up visit.  Please call the clinic at [phone] if you have any questions.      Cintia Ortiz    Please send results to patient.

## 2018-02-23 ENCOUNTER — OFFICE VISIT (OUTPATIENT)
Dept: FAMILY MEDICINE | Facility: CLINIC | Age: 68
End: 2018-02-23
Payer: MEDICAID

## 2018-02-23 VITALS
TEMPERATURE: 97.9 F | SYSTOLIC BLOOD PRESSURE: 123 MMHG | DIASTOLIC BLOOD PRESSURE: 79 MMHG | HEART RATE: 83 BPM | WEIGHT: 148.4 LBS | BODY MASS INDEX: 27.14 KG/M2

## 2018-02-23 DIAGNOSIS — Z12.11 SCREENING FOR COLON CANCER: Primary | ICD-10-CM

## 2018-02-23 DIAGNOSIS — K59.00 CONSTIPATION, UNSPECIFIED CONSTIPATION TYPE: ICD-10-CM

## 2018-02-23 DIAGNOSIS — K21.9 GASTROESOPHAGEAL REFLUX DISEASE, ESOPHAGITIS PRESENCE NOT SPECIFIED: ICD-10-CM

## 2018-02-23 DIAGNOSIS — M10.9 ACUTE GOUTY ARTHRITIS: ICD-10-CM

## 2018-02-23 RX ORDER — ASPIRIN 81 MG
100 TABLET, DELAYED RELEASE (ENTERIC COATED) ORAL 2 TIMES DAILY PRN
Qty: 60 TABLET | Refills: 1 | Status: SHIPPED | OUTPATIENT
Start: 2018-02-23 | End: 2019-04-16

## 2018-02-23 RX ORDER — MAGNESIUM HYDROXIDE/ALUMINUM HYDROXICE/SIMETHICONE 120; 1200; 1200 MG/30ML; MG/30ML; MG/30ML
15 SUSPENSION ORAL EVERY 4 HOURS PRN
Qty: 1 BOTTLE | Refills: 1 | Status: SHIPPED | OUTPATIENT
Start: 2018-02-23 | End: 2019-04-16

## 2018-02-23 RX ORDER — PREDNISONE 20 MG/1
TABLET ORAL
Qty: 10 TABLET | Refills: 0 | Status: SHIPPED | OUTPATIENT
Start: 2018-02-23 | End: 2018-04-27

## 2018-02-23 ASSESSMENT — ANXIETY QUESTIONNAIRES
1. FEELING NERVOUS, ANXIOUS, OR ON EDGE: MORE THAN HALF THE DAYS
GAD7 TOTAL SCORE: 8
7. FEELING AFRAID AS IF SOMETHING AWFUL MIGHT HAPPEN: SEVERAL DAYS
2. NOT BEING ABLE TO STOP OR CONTROL WORRYING: NOT AT ALL
3. WORRYING TOO MUCH ABOUT DIFFERENT THINGS: SEVERAL DAYS
5. BEING SO RESTLESS THAT IT IS HARD TO SIT STILL: SEVERAL DAYS
6. BECOMING EASILY ANNOYED OR IRRITABLE: MORE THAN HALF THE DAYS

## 2018-02-23 ASSESSMENT — PATIENT HEALTH QUESTIONNAIRE - PHQ9: 5. POOR APPETITE OR OVEREATING: SEVERAL DAYS

## 2018-02-23 NOTE — PATIENT INSTRUCTIONS
CONTINUE To take the allopurinol and colchicine daily.      START taking the omeprazole, 1 pill daily at night.    TAKE the stool softener (docusate--pill)   at night as needed to prevent constipation.    Take the Maalox in the morning (liquid) to help with heartburn symptoms.      Refill the prednisone in case you need it.  Call us if symptoms are bad enough that you need to take it.      Follow up in 2 months for recheck, sooner if belly pain is not getting better.

## 2018-02-23 NOTE — MR AVS SNAPSHOT
After Visit Summary   2/23/2018    Cristina Lyles    MRN: [id number]           Patient Information     Date Of Birth          1950        Visit Information        Provider Department      2/23/2018 8:20 AM Cintia Ortiz MD [facility]        Today's Diagnoses     Screening for colon cancer    -  1    Gastroesophageal reflux disease, esophagitis presence not specified        Constipation, unspecified constipation type        Acute gouty arthritis          Care Instructions    CONTINUE To take the allopurinol and colchicine daily.      START taking the omeprazole, 1 pill daily at night.    TAKE the stool softener (docusate--pill)   at night as needed to prevent constipation.    Take the Maalox in the morning (liquid) to help with heartburn symptoms.      Refill the prednisone in case you need it.  Call us if symptoms are bad enough that you need to take it.      Follow up in 2 months for recheck, sooner if belly pain is not getting better.            Follow-ups after your visit        Future tests that were ordered for you today     Open Future Orders        Priority Expected Expires Ordered    Fecal Occult Blood - FIT, iFOB ([facility]) Routine  3/2/2018 2/23/2018            Who to contact     Please call your clinic at [phone] to:    Ask questions about your health    Make or cancel appointments    Discuss your medicines    Learn about your test results    Speak to your doctor            Additional Information About Your Visit        MyChart Information     Dolosyst is an electronic gateway that provides easy, online access to your medical records. With YelloYello, you can request a clinic appointment, read your test results, renew a prescription or communicate with your care team.     To sign up for [employer] visit the website at [email or web address]   You will be asked to enter the access code listed below, as well as some personal information. Please follow the directions to create  your username and password.     Your access code is: [email or web address]  Expires: 3/27/2018  3:50 PM     Your access code will  in 90 days. If you need help or a new code, please contact your [facility] or call [phone] for assistance.        Care EveryWhere ID     This is your Care EveryWhere ID. This could be used by other organizations to access your [facility] medical records  [id number]        Your Vitals Were     Pulse Temperature BMI (Body Mass Index)             83 97.9  F (36.6  C) (Oral) 27.14 kg/m2          Blood Pressure from Last 3 Encounters:   18 123/79   18 114/76   17 145/75    Weight from Last 3 Encounters:   18 148 lb 6.4 oz (67.3 kg)   18 146 lb 3.2 oz (66.3 kg)   17 144 lb (65.3 kg)                 Today's Medication Changes          These changes are accurate as of 18 10:00 AM.  If you have any questions, ask your nurse or doctor.               Start taking these medicines.        Dose/Directions    alum & mag hydroxide-simethicone 200-200-20 MG/5ML Susp suspension   Commonly known as:  MYLANTA/MAALOX   Used for:  Gastroesophageal reflux disease, esophagitis presence not specified   Started by:  Cintia Ortiz MD        Dose:  15 mL   Take 15 mLs by mouth every 4 hours as needed for indigestion   Quantity:  1 Bottle   Refills:  1       docusate sodium 100 MG tablet   Commonly known as:  COLACE   Used for:  Constipation, unspecified constipation type   Started by:  Cintia Ortiz MD        Dose:  100 mg   Take 100 mg by mouth 2 times daily as needed for constipation   Quantity:  60 tablet   Refills:  1         These medicines have changed or have updated prescriptions.        Dose/Directions    omeprazole 20 MG CR capsule   Commonly known as:  priLOSEC   This may have changed:    - medication strength  - how much to take  - additional instructions   Used for:  Gastroesophageal reflux disease, esophagitis  presence not specified   Changed by:  Cintia Ortiz MD        Dose:  20 mg   Take 1 capsule (20 mg) by mouth daily   Quantity:  30 capsule   Refills:  0       predniSONE 20 MG tablet   Commonly known as:  DELTASONE   This may have changed:  additional instructions   Used for:  Acute gouty arthritis   Changed by:  Cintia Ortiz MD        Take per physician instructions for gout flare.   Quantity:  10 tablet   Refills:  0            Where to get your medicines      These medications were sent to [facility] - [place], [place] - [address] [place] [address] [address]kw[address]  [address], [place] [address]     Phone:  [phone]     alum & mag hydroxide-simethicone 200-200-20 MG/5ML Susp suspension    docusate sodium 100 MG tablet    omeprazole 20 MG CR capsule    predniSONE 20 MG tablet                Primary Care Provider Office Phone # Fax #    Cintia Ortiz [phone]       [facility] [address] [place] [address]        Equal Access to Services     JAMES HUBBARD AH: Hadii aad ku hadasho Soomaali, waaxda luqadaha, qaybta kaalmada adeegyada, waxay idiin hayaan adeeg tayloraraamanda lane . So [facility] [phone].    ATENCIÓN: Si habla español, tiene a austin disposición servicios gratuitos de asistencia lingüística. Ll[facility] [phone].    We comply with applicable federal civil rights laws and [place] laws. We do not discriminate on the basis of race, color, national origin, age, disability, sex, sexual orientation, or gender identity.            Thank you!     Thank you for choosing [facility]  for your care. Our goal is always to provide you with excellent care. Hearing back from our patients is one way we can continue to improve our services. Please take a few minutes to complete the written survey that you may receive in the mail after your visit with us. Thank you!             Your Updated Medication List - Protect others around you: Learn how to safely use, store  and throw away your medicines at www.disposemymeds.org.          This list is accurate as of 2/23/18 10:00 AM.  Always use your most recent med list.                   Brand Name Dispense Instructions for use Diagnosis    allopurinol 300 MG tablet    ZYLOPRIM    90 tablet    Take 1 tablet (300 mg) by mouth daily    Chronic gout of right foot, unspecified cause       alum & mag hydroxide-simethicone 200-200-20 MG/5ML Susp suspension    MYLANTA/MAALOX    1 Bottle    Take 15 mLs by mouth every 4 hours as needed for indigestion    Gastroesophageal reflux disease, esophagitis presence not specified       colchicine 0.6 MG tablet    COLCRYS    30 tablet    Take 1 tablet (0.6 mg) by mouth daily    Chronic gout of right foot, unspecified cause, CKD (chronic kidney disease) stage 3, GFR 30-59 ml/min       docusate sodium 100 MG tablet    COLACE    60 tablet    Take 100 mg by mouth 2 times daily as needed for constipation    Constipation, unspecified constipation type       omeprazole 20 MG CR capsule    priLOSEC    30 capsule    Take 1 capsule (20 mg) by mouth daily    Gastroesophageal reflux disease, esophagitis presence not specified       predniSONE 20 MG tablet    DELTASONE    10 tablet    Take per physician instructions for gout flare.    Acute gouty arthritis

## 2018-02-23 NOTE — NURSING NOTE
name: Paulette Hubbard  Language: Zulayong  Agency: [facility]  Phone number: [phone]

## 2018-02-24 ASSESSMENT — ANXIETY QUESTIONNAIRES: GAD7 TOTAL SCORE: 8

## 2018-02-24 ASSESSMENT — PATIENT HEALTH QUESTIONNAIRE - PHQ9: SUM OF ALL RESPONSES TO PHQ QUESTIONS 1-9: 12

## 2018-02-24 NOTE — PROGRESS NOTES
"  Nursing Notes:   Lesly Garcia, [facility]  2/23/2018  9:07 AM  Signed   name: Paulette Hubbard  Language: [employer]  Agency: [employer]  Phone number: [phone]      KASHIF Lyles is a 67 year old male with past medical history significant for    Patient Active Problem List   Diagnosis     Anxiety state     Constipation     Depressive disorder, not elsewhere classified     Esophageal reflux     Major depressive disorder, recurrent episode (H)     Health Care Home     Gout     Rosacea     Others present at the visit:   Paulette Hubbard    Presents for   Chief Complaint   Patient presents with     Arthritis     Pt is here for gout follow up.     Medication Reconciliation     Complete.      Visit was initially scheduled for follow-up on fatigue and gout.  Patient shares that his energy is getting better.  His gout has improved and he is no longer having any pain in his knees or feet.  However, he is having some stomach pain today.  He describes this as \"feeling like he has an ulcer\" he has a history of previous GERD and previous peptic ulcer.  Has been on prednisone on and off for his gout.  He is no longer taking anything for GERD currently.  He says the pain is the worst in the early morning.  He notices a burning sensation.  It is also worse before eating.  Additionally he feels more bloating and pressure before he goes to the bathroom.  Has had some constipation over the last few weeks although this resolved about 1 week ago.  Describes the sensation of gas.  He rubs his lower abdomen and this helps somewhat to relieve the pain.  He denies any nausea or vomiting.  No diarrhea, bloody stools, or melena.  No change in appetite.  No dizziness or lightheadedness.    We also discussed all his lab tests from his visit last month.  No clear signs for why he is feeling fatigued.  Hemoglobin is normal.  TSH is normal.  Kidney tests are normal.  White count is normal.  His uric acid level continues to " improve.  He is requesting refill on his colchicine and allopurinol.  Would also like a refill on prednisone in case he gets further gout symptoms.    OBJECTIVE:  Vitals: /79 (BP Location: Left arm, Patient Position: Sitting, Cuff Size: Adult Regular)  Pulse 83  Temp 97.9  F (36.6  C) (Oral)  Wt 148 lb 6.4 oz (67.3 kg)  BMI 27.14 kg/m2  BMI= Body mass index is 27.14 kg/(m^2).  Objective:    Vitals:  Vitals are reviewed and are within the normal range  Gen:  Alert, pleasant, mild discomfort, but no acute distress.  Cardiac:  Regular rate and rhythm, no murmurs, rubs or gallops  Respiratory:  Lungs clear to auscultation bilaterally  Abdomen:  Soft, she has mild tenderness in the left lower quadrant as well as left upper quadrant.  No rebound or guarding.  No CVA tenderness.  No periumbilical tenderness.  Bowel sounds are present and active but somewhat diminished.  Extremities:  Warm, well-perfused, pulses 2+/4, no lower extremity edema knee and foot in areas of previous gout are now nontender..      Results for orders placed or performed in visit on 01/22/18   Uric Acid ([employer])   Result Value Ref Range    Uric Acid 7.6 3.0 - 8.0 mg/dL    Narrative    Test performed by:  [facility] [address] [place] [address]   Basic Metabolic Panel ([place])   Result Value Ref Range    Urea Nitrogen 16.4 7.0 - 21.0 mg/dL    Calcium 8.8 8.5 - 10.1 mg/dL    Chloride 107.5 98.0 - 110.0 mmol/L    Carbon Dioxide 29.8 20.0 - 32.0 mmol/L    Creatinine 1.1 0.7 - 1.3 mg/dL    Glucose 103.5 (H) 70.0 - 99.0 mg'dL    Potassium 4.2 3.2 - 4.6 mmol/dL    Sodium 143.7 (H) 132.0 - 142.0 mmol/L    GFR Estimate 71.2 >60.0 mL/min/1.7 m2    GFR Estimate If Black 86.1 >60.0 mL/min/1.7 m2   Thyroid [place] ([employer])   Result Value Ref Range    TSH 0.98 0.30 - 5.00 uIU/mL    Narrative    Test performed by:  [facility]  [address] [place] [address]   Hepatitis C Antibody ([employer])   Result  Value Ref Range    Hepatitis C Antibody Screen Negative Negative    Narrative    Test performed by:  [facility]  [address] [place] [address]   CBC w/ Diff and Plt ([facility])   Result Value Ref Range    WBC 5.6 4.0 - 11.0 thou/uL    RBC 5.91 4.40 - 6.20 mill/uL    Hemoglobin 16.9 14.0 - 18.0 g/dL    Hematocrit 52.3 40.0 - 54.0 %    MCV 89 80 - 100 fL    MCH 28.6 27.0 - 34.0 pg    MCHC 32.3 32.0 - 36.0 g/dL    RDW 14.6 (H) 11.0 - 14.5 %    Platelets 161 140 - 440 thou/uL    Mean Platelet Volume 9.1 8.5 - 12.5 fL    % Neutrophils 68 50 - 70 %    % Lymphocytes 24 20 - 40 %    % Monocytes 7 2 - 10 %    % Eosinophils 1 0 - 6 %    % Basophils 0 0 - 2 %    Neutrophils (Absolute) 3.8 2.0 - 7.7 thou/uL    Lymphs (Absolute) 1.3 0.8 - 4.4 thou/uL    Monocytes(Absolute) 0.4 0.0 - 0.9 thou/uL    Eos (Absolute) 0.1 0.0 - 0.4 thou/uL    Baso (Absolute) 0.0 0.0 - 0.2 thou/uL    Narrative    Test performed by:  [facility]  [address] [place] [address]         ASSESSMENT AND PLAN:      Cristina was seen today for arthritis and medication reconciliation.  Gout symptoms have improved.  I did refill his prednisone as well has his colchicine and allopurinol.  Discussed that the prednisone can cause worsening GERD symptoms, and then to try to avoid it if possible.  Requested that he call our clinic if he did not take the prednisone.  For the likely gastritis, worsened by prednisone, I did give him a one-month supply of omeprazole.  Also gave some as needed Maalox.  Would recommend transferring over to Pepcid at his next visit.  I did consider diverticulitis on the differential, but I think the pain is most likely secondary to constipation.  Prescribed Colace and encouraged him to drink more water and eat more fiber in his diet.    Diagnoses and all orders for this visit:    Screening for colon cancer  -     Fecal Occult Blood - FIT, iFOB ([facility]); Future    Gastroesophageal reflux disease,  esophagitis presence not specified  -     omeprazole (PRILOSEC) 20 MG CR capsule; Take 1 capsule (20 mg) by mouth daily  -     alum & mag hydroxide-simethicone (MYLANTA/MAALOX) 200-200-20 MG/5ML SUSP suspension; Take 15 mLs by mouth every 4 hours as needed for indigestion    Constipation, unspecified constipation type  -     docusate sodium (COLACE) 100 MG tablet; Take 100 mg by mouth 2 times daily as needed for constipation    Acute gouty arthritis  -     predniSONE (DELTASONE) 20 MG tablet; Take per physician instructions for gout flare.        Patient Instructions   CONTINUE To take the allopurinol and colchicine daily.      START taking the omeprazole, 1 pill daily at night.    TAKE the stool softener (docusate--pill)   at night as needed to prevent constipation.    Take the Maalox in the morning (liquid) to help with heartburn symptoms.      Refill the prednisone in case you need it.  Call us if symptoms are bad enough that you need to take it.      Follow up in 2 months for recheck, sooner if belly pain is not getting better.          Cintia Ortiz

## 2018-04-17 ENCOUNTER — OFFICE VISIT (OUTPATIENT)
Dept: FAMILY MEDICINE | Facility: CLINIC | Age: 68
End: 2018-04-17
Payer: MEDICAID

## 2018-04-17 VITALS
SYSTOLIC BLOOD PRESSURE: 129 MMHG | HEART RATE: 76 BPM | WEIGHT: 148.8 LBS | TEMPERATURE: 98.2 F | DIASTOLIC BLOOD PRESSURE: 78 MMHG | BODY MASS INDEX: 27.22 KG/M2 | OXYGEN SATURATION: 96 %

## 2018-04-17 DIAGNOSIS — N18.30 CKD (CHRONIC KIDNEY DISEASE) STAGE 3, GFR 30-59 ML/MIN (H): Primary | ICD-10-CM

## 2018-04-17 DIAGNOSIS — M75.51 BURSITIS OF RIGHT SHOULDER: ICD-10-CM

## 2018-04-17 RX ORDER — NAPROXEN 500 MG/1
500 TABLET ORAL 2 TIMES DAILY PRN
Qty: 20 TABLET | Refills: 1 | Status: SHIPPED | OUTPATIENT
Start: 2018-04-17 | End: 2019-03-15

## 2018-04-17 ASSESSMENT — ANXIETY QUESTIONNAIRES
2. NOT BEING ABLE TO STOP OR CONTROL WORRYING: SEVERAL DAYS
IF YOU CHECKED OFF ANY PROBLEMS ON THIS QUESTIONNAIRE, HOW DIFFICULT HAVE THESE PROBLEMS MADE IT FOR YOU TO DO YOUR WORK, TAKE CARE OF THINGS AT HOME, OR GET ALONG WITH OTHER PEOPLE: NOT DIFFICULT AT ALL
7. FEELING AFRAID AS IF SOMETHING AWFUL MIGHT HAPPEN: NOT AT ALL
3. WORRYING TOO MUCH ABOUT DIFFERENT THINGS: MORE THAN HALF THE DAYS
6. BECOMING EASILY ANNOYED OR IRRITABLE: NOT AT ALL
1. FEELING NERVOUS, ANXIOUS, OR ON EDGE: SEVERAL DAYS
5. BEING SO RESTLESS THAT IT IS HARD TO SIT STILL: NOT AT ALL
GAD7 TOTAL SCORE: 4

## 2018-04-17 ASSESSMENT — PATIENT HEALTH QUESTIONNAIRE - PHQ9: 5. POOR APPETITE OR OVEREATING: NOT AT ALL

## 2018-04-17 NOTE — PATIENT INSTRUCTIONS
"RIGHT Shoulder pain.  Signs of impingment. Hard to reach, tuck shirt in in back. Has to sleep on the Left side.    1) \"pendulum\" exercise with 12 ounce can.  Three sets of 20 seconds each side. Will take two minute to complete.  Do THREE times a day.    2) Naproxen (to decrease pain and inflamation). Take with food AS NEEDED    Return in one week.  If better, will start rotator cuff strengthening exercises.  If not better would consider injection  To bursa; imaging after that if no improvement  "

## 2018-04-17 NOTE — NURSING NOTE
Name:  Janie Ko  Phone:  [phone]  Language:  Hmong             Agency:  [facility]

## 2018-04-17 NOTE — MR AVS SNAPSHOT
"              After Visit Summary   4/17/2018    Cristina Lyles    MRN: [id number]           Patient Information     Date Of Birth          1950        Visit Information        Provider Department      4/17/2018 2:30 PM Joshua Bauer MD [facility]        Today's Diagnoses     CKD (chronic kidney disease) stage 3, GFR 30-59 ml/min    -  1    Bursitis of right shoulder          Care Instructions    RIGHT Shoulder pain.  Signs of impingment. Hard to reach, tuck shirt in in back. Has to sleep on the Left side.    1) \"pendulum\" exercise with 12 ounce can.  Three sets of 20 seconds each side. Will take two minute to complete.  Do THREE times a day.    2) Naproxen (to decrease pain and inflamation). Take with food AS NEEDED    Return in one week.  If better, will start rotator cuff strengthening exercises.  If not better would consider injection  To bursa; imaging after that if no improvement          Follow-ups after your visit        Your next 10 appointments already scheduled     Apr 27, 2018  3:50 PM CDT   Return Visit with Cintia Ortiz MD   [facility] ([facility])    [address] [place] [address]   [phone]              Who to contact     Please call your clinic at [phone] to:    Ask questions about your health    Make or cancel appointments    Discuss your medicines    Learn about your test results    Speak to your doctor            Additional Information About Your Visit        MyChart Information     [employer] is an electronic gateway that provides easy, online access to your medical records. With [employer], you can request a clinic appointment, read your test results, renew a prescription or communicate with your care team.     To sign up for [employer] visit the website at [email or web address]   You will be asked to enter the access code listed below, as well as some personal information. Please follow the directions to create your username and password.     Your access " code is: [email or web address]  Expires: 2018  3:20 PM     Your access code will  in 90 days. If you need help or a new code, please contact your [facility] or call [phone] for assistance.        Care EveryWhere ID     This is your Care EveryWhere ID. This could be used by other organizations to access your [facility] medical records  [id number]        Your Vitals Were     Pulse Temperature Pulse Oximetry BMI (Body Mass Index)          76 98.2  F (36.8  C) (Oral) 96% 27.22 kg/m2         Blood Pressure from Last 3 Encounters:   18 129/78   18 123/79   18 114/76    Weight from Last 3 Encounters:   18 148 lb 12.8 oz (67.5 kg)   18 148 lb 6.4 oz (67.3 kg)   18 146 lb 3.2 oz (66.3 kg)              Today, you had the following     No orders found for display         Today's Medication Changes          These changes are accurate as of 18  3:20 PM.  If you have any questions, ask your nurse or doctor.               Start taking these medicines.        Dose/Directions    naproxen 500 MG tablet   Commonly known as:  NAPROSYN   Used for:  Bursitis of right shoulder   Started by:  Joshua Bauer MD        Dose:  500 mg   Take 1 tablet (500 mg) by mouth 2 times daily as needed for moderate pain   Quantity:  20 tablet   Refills:  1            Where to get your medicines      These medications were sent to [facility] - [place], [place] - [address] [place] [address] [address]kw[address]  [address], [place] [address]     Phone:  [phone]     naproxen 500 MG tablet                Primary Care Provider Office Phone # Fax #    Cintia Ortiz [phone]       [facility] [address] [place] [address]        Equal Access to Services     JAMES HUBBARD AH: Fabio Zamarripa, wamilton luqabdoulaye, qaybta kaalmavargas lin. [facility] [phone].    ATENCIÓN: Si ally dillon,  tiene a austin disposición servicios gratuitos de asistencia lingüística. Armand yoo [phone].    We comply with applicable federal civil rights laws and [place] laws. We do not discriminate on the basis of race, color, national origin, age, disability, sex, sexual orientation, or gender identity.            Thank you!     Thank you for choosing [facility]  for your care. Our goal is always to provide you with excellent care. Hearing back from our patients is one way we can continue to improve our services. Please take a few minutes to complete the written survey that you may receive in the mail after your visit with us. Thank you!             Your Updated Medication List - Protect others around you: Learn how to safely use, store and throw away your medicines at www.disposemymeds.org.          This list is accurate as of 4/17/18  3:20 PM.  Always use your most recent med list.                   Brand Name Dispense Instructions for use Diagnosis    allopurinol 300 MG tablet    ZYLOPRIM    90 tablet    Take 1 tablet (300 mg) by mouth daily    Chronic gout of right foot, unspecified cause       alum & mag hydroxide-simethicone 200-200-20 MG/5ML Susp suspension    MYLANTA/MAALOX    1 Bottle    Take 15 mLs by mouth every 4 hours as needed for indigestion    Gastroesophageal reflux disease, esophagitis presence not specified       colchicine 0.6 MG tablet    COLCRYS    30 tablet    Take 1 tablet (0.6 mg) by mouth daily    Chronic gout of right foot, unspecified cause, CKD (chronic kidney disease) stage 3, GFR 30-59 ml/min       docusate sodium 100 MG tablet    COLACE    60 tablet    Take 100 mg by mouth 2 times daily as needed for constipation    Constipation, unspecified constipation type       naproxen 500 MG tablet    NAPROSYN    20 tablet    Take 1 tablet (500 mg) by mouth 2 times daily as needed for moderate pain    Bursitis of right shoulder       omeprazole 20 MG CR capsule    priLOSEC    30 capsule    Take 1  capsule (20 mg) by mouth daily    Gastroesophageal reflux disease, esophagitis presence not specified       predniSONE 20 MG tablet    DELTASONE    10 tablet    Take per physician instructions for gout flare.    Acute gouty arthritis

## 2018-04-17 NOTE — PROGRESS NOTES
SUBJECTIVE       Cristina Lyles is a 67 year old  male with a PMH significant for:     Patient Active Problem List   Diagnosis     Anxiety state     Constipation     Depressive disorder, not elsewhere classified     Esophageal reflux     Major depressive disorder, recurrent episode (H)     Health Care Home     Gout     Rosacea     He presents with RIGHT Shoulder pain.  Signs of impingment. Hard to reach, tuck shirt in in back. Has to sleep on the Left side..    PMH, Medications and Allergies were reviewed and updated as needed.        REVIEW OF SYSTEMS     General: No fevers, chills, sweats, unexplained weight loss  Head: No headache  Neck: No swallowing problems   CV: No chest pain or palpitations  Resp: No shortness of breath.  No cough. No hemoptysis.  GI: No constipation, diarrhea, or blood in stool.  no nausea or vomiting  : No pain passing urine or urinary frequency            OBJECTIVE     Vitals:    04/17/18 1430   BP: 129/78   Pulse: 76   Temp: 98.2  F (36.8  C)   TempSrc: Oral   SpO2: 96%   Weight: 148 lb 12.8 oz (67.5 kg)     Body mass index is 27.22 kg/(m^2).    Gen:  Well nourished and in NAD  HEENT: PERRLA; TMs normal color and landmarks; nasopharynx pink and moist; oropharynx pink and moist  Neck: supple without lymphadenopathy  CV:  RRR  - no murmurs, rubs, or gallups,   Pulm:  CTAB, no wheezes/rales/rhonchi, good air entry   ABD: soft, nontender, no masses, no rebound, BS intact throughout  Extrem: RIGHT shoulder: 4+/5 strength internal/external rotation. Full passive ROM. inpingment signs present; Hankin's reproduces pain  Psych: Euthymic     No results found for this or any previous visit (from the past 24 hour(s)).        ASSESSMENT AND PLAN     Cristina was seen today for shoulder pain and medication reconciliation.    Diagnoses and all orders for this visit:    CKD (chronic kidney disease) stage 3, GFR 30-59 ml/min    Bursitis of right shoulder  -     naproxen (NAPROSYN) 500 MG tablet; Take 1  "tablet (500 mg) by mouth 2 times daily as needed for moderate pain        Patient Instructions   RIGHT Shoulder pain.  Signs of impingment. Hard to reach, tuck shirt in in back. Has to sleep on the Left side.    1) \"pendulum\" exercise with 12 ounce can.  Three sets of 20 seconds each side. Will take two minute to complete.  Do THREE times a day.    2) Naproxen (to decrease pain and inflamation). Take with food AS NEEDED    Return in one week.  If better, will start rotator cuff strengthening exercises.  If not better would consider injection  To bursa; imaging after that if no improvement      Total of 30 minutes was spent in face to face contact with patient with > 50% in counseling and coordination of care.  Options for treatment and/or follow-up care were reviewed with the patient. Cristina Lyles was engaged and actively involved in the decision making process. He verbalized understanding of the options discussed and was satisfied with the final plan.  .    Joshua Bauer MD        "

## 2018-04-18 ASSESSMENT — PATIENT HEALTH QUESTIONNAIRE - PHQ9: SUM OF ALL RESPONSES TO PHQ QUESTIONS 1-9: 7

## 2018-04-18 ASSESSMENT — ANXIETY QUESTIONNAIRES: GAD7 TOTAL SCORE: 4

## 2018-04-25 DIAGNOSIS — M1A.0790 IDIOPATHIC CHRONIC GOUT OF FOOT WITHOUT TOPHUS, UNSPECIFIED LATERALITY: Primary | ICD-10-CM

## 2018-04-27 ENCOUNTER — OFFICE VISIT (OUTPATIENT)
Dept: FAMILY MEDICINE | Facility: CLINIC | Age: 68
End: 2018-04-27
Payer: MEDICAID

## 2018-04-27 VITALS
SYSTOLIC BLOOD PRESSURE: 127 MMHG | DIASTOLIC BLOOD PRESSURE: 80 MMHG | HEART RATE: 95 BPM | OXYGEN SATURATION: 96 % | TEMPERATURE: 98.4 F | RESPIRATION RATE: 16 BRPM | BODY MASS INDEX: 27.47 KG/M2 | WEIGHT: 150.2 LBS

## 2018-04-27 DIAGNOSIS — R05.9 COUGH: ICD-10-CM

## 2018-04-27 DIAGNOSIS — L71.9 ROSACEA: ICD-10-CM

## 2018-04-27 DIAGNOSIS — M25.511 RIGHT SHOULDER PAIN, UNSPECIFIED CHRONICITY: Primary | ICD-10-CM

## 2018-04-27 DIAGNOSIS — Z23 NEED FOR VACCINATION: ICD-10-CM

## 2018-04-27 DIAGNOSIS — M1A.0790 IDIOPATHIC CHRONIC GOUT OF FOOT WITHOUT TOPHUS, UNSPECIFIED LATERALITY: ICD-10-CM

## 2018-04-27 DIAGNOSIS — Z12.11 SPECIAL SCREENING FOR MALIGNANT NEOPLASMS, COLON: ICD-10-CM

## 2018-04-27 LAB
BUN SERPL-MCNC: 16.4 MG/DL (ref 7–21)
CALCIUM SERPL-MCNC: 9.5 MG/DL (ref 8.5–10.1)
CHLORIDE SERPLBLD-SCNC: 101.6 MMOL/L (ref 98–110)
CO2 SERPL-SCNC: 27.1 MMOL/L (ref 20–32)
CREAT SERPL-MCNC: 1.1 MG/DL (ref 0.7–1.3)
GFR SERPL CREATININE-BSD FRML MDRD: 71.2 ML/MIN/1.7 M2
GLUCOSE SERPL-MCNC: 130.9 MG'DL (ref 70–99)
POTASSIUM SERPL-SCNC: 4.1 MMOL/DL (ref 3.2–4.6)
SODIUM SERPL-SCNC: 137.8 MMOL/L (ref 132–142)
URATE SERPL-MCNC: 6.2 MG/DL (ref 3–8)

## 2018-04-27 RX ORDER — GUAIFENESIN/DEXTROMETHORPHAN 100-10MG/5
5 SYRUP ORAL EVERY 4 HOURS PRN
Qty: 560 ML | Refills: 0 | Status: SHIPPED | OUTPATIENT
Start: 2018-04-27 | End: 2019-04-16

## 2018-04-27 RX ORDER — METRONIDAZOLE 7.5 MG/G
GEL TOPICAL
Qty: 45 G | Refills: 3 | Status: SHIPPED | OUTPATIENT
Start: 2018-04-27 | End: 2019-03-15

## 2018-04-27 NOTE — NURSING NOTE
name: Liang Daily  Agency: [employer]  Language: [employer]   Telephone number: [phone]  Type of interpretation: Face-to-face, spoken

## 2018-04-27 NOTE — LETTER
April 30, 2018      Cristina Wa Rafal  [address]  [facility] [address]        Dear Cristina,  Here is a copy of your lab results.  Your uric acid level looks good.  Your kidney tests looks good.  I hope the shoulder is feeling better.  Please call the clinic at [phone] if you have any questions.     Please see below for your test results.    Resulted Orders   Basic Metabolic Panel ([place])   Result Value Ref Range    Urea Nitrogen 16.4 7.0 - 21.0 mg/dL    Calcium 9.5 8.5 - 10.1 mg/dL    Chloride 101.6 98.0 - 110.0 mmol/L    Carbon Dioxide 27.1 20.0 - 32.0 mmol/L    Creatinine 1.1 0.7 - 1.3 mg/dL    Glucose 130.9 (H) 70.0 - 99.0 mg'dL    Potassium 4.1 3.2 - 4.6 mmol/dL    Sodium 137.8 132.0 - 142.0 mmol/L    GFR Estimate 71.2 >60.0 mL/min/1.7 m2    GFR Estimate If Black 86.1 >60.0 mL/min/1.7 m2   Uric Acid ([facility])   Result Value Ref Range    Uric Acid 6.2 3.0 - 8.0 mg/dL    Narrative    Test performed by:  [facility]  [address] [place] [address]       If you have any questions, please call the clinic to make an appointment.    Sincerely,    Cintia Ortiz MD

## 2018-04-27 NOTE — PATIENT INSTRUCTIONS
Injection in shoulder today.      May use ice or naproxen over the next few days if not getting better.      Cough syrup for cough.  Or use tea with honey    Refill on cream for the nose and face.      Return to clinic if shoulder is still causing pain and we can do an XRAY or MRI.

## 2018-04-27 NOTE — MR AVS SNAPSHOT
After Visit Summary   2018    Cristina Lyles    MRN: [id number]           Patient Information     Date Of Birth          1950        Visit Information        Provider Department      2018 3:50 PM Cintia Ortiz MD [facility]        Today's Diagnoses     Special screening for malignant neoplasms, colon    -  1    Idiopathic chronic gout of foot without tophus, unspecified laterality        Need for vaccination        Rosacea        Cough          Care Instructions    Injection in shoulder today.      May use ice or naproxen over the next few days if not getting better.      Cough syrup for cough.  Or use tea with honey    Refill on cream for the nose and face.      Return to clinic if shoulder is still causing pain and we can do an XRAY or MRI.            Follow-ups after your visit        Future tests that were ordered for you today     Open Future Orders        Priority Expected Expires Ordered    Fecal Occult Blood - FIT, iFOB ([facility]) Routine  2018            Who to contact     Please call your clinic at [phone] to:    Ask questions about your health    Make or cancel appointments    Discuss your medicines    Learn about your test results    Speak to your doctor            Additional Information About Your Visit        MyChart Information     [employer] is an electronic gateway that provides easy, online access to your medical records. With [employer], you can request a clinic appointment, read your test results, renew a prescription or communicate with your care team.     To sign up for [employer] visit the website at [email or web address]   You will be asked to enter the access code listed below, as well as some personal information. Please follow the directions to create your username and password.     Your access code is: [email or web address]  Expires: 2018  3:20 PM     Your access code will  in 90 days. If you need help or a new code, please contact your  [facility] or call [phone] for assistance.        Care EveryWhere ID     This is your Care EveryWhere ID. This could be used by other organizations to access your [facility] medical records  [id number]        Your Vitals Were     Pulse Temperature Respirations Pulse Oximetry BMI (Body Mass Index)       95 98.4  F (36.9  C) (Oral) 16 96% 27.47 kg/m2        Blood Pressure from Last 3 Encounters:   04/27/18 127/80   04/17/18 129/78   02/23/18 123/79    Weight from Last 3 Encounters:   04/27/18 150 lb 3.2 oz (68.1 kg)   04/17/18 148 lb 12.8 oz (67.5 kg)   02/23/18 148 lb 6.4 oz (67.3 kg)              We Performed the Following     ADMIN VACCINE, INITIAL     Basic Metabolic Panel ([facility])     Pneumococcal vaccine 13 valent PCV13 IM (Prevnar) [16563]     Uric Acid ([facility])          Today's Medication Changes          These changes are accurate as of 4/27/18  5:34 PM.  If you have any questions, ask your nurse or doctor.               Start taking these medicines.        Dose/Directions    guaiFENesin-dextromethorphan 100-10 MG/5ML syrup   Commonly known as:  ROBITUSSIN DM   Used for:  Cough   Started by:  Cintia Ortiz MD        Dose:  5 mL   Take 5 mLs by mouth every 4 hours as needed for cough   Quantity:  560 mL   Refills:  0       metroNIDAZOLE 0.75 % topical gel   Commonly known as:  METROGEL   Used for:  Rosacea   Started by:  Cintia Ortiz MD        Apply thin layer to affected area in the morning or the evening.   Quantity:  45 g   Refills:  3         Stop taking these medicines if you haven't already. Please contact your care team if you have questions.     predniSONE 20 MG tablet   Commonly known as:  DELTASONE   Stopped by:  Cintia Ortiz MD                Where to get your medicines      These medications were sent to [facility] - [place], [place] - [address]  [address], [place] [address]     Phone:   [phone]     guaiFENesin-dextromethorphan 100-10 MG/5ML syrup    metroNIDAZOLE 0.75 % topical gel                Primary Care Provider Office Phone # Fax #    Cintia Ortiz [phone]       [facility] [address] [place] [address]        Equal Access to Services     ANDREIANTON STEVIE : Hadii aad ku hadasho Soomaali, waaxda luqadaha, qaybta kaalmada adeegyada, waxay idiin hayaan adeeg kharash la'aan ah. So [facility] [phone].    ATENCIÓN: Si habla español, tiene a austin disposición servicios gratuitos de asistencia lingüística. Yayaame al [phone].    We comply with applicable federal civil rights laws and [place] laws. We do not discriminate on the basis of race, color, national origin, age, disability, sex, sexual orientation, or gender identity.            Thank you!     Thank you for choosing [facility]  for your care. Our goal is always to provide you with excellent care. Hearing back from our patients is one way we can continue to improve our services. Please take a few minutes to complete the written survey that you may receive in the mail after your visit with us. Thank you!             Your Updated Medication List - Protect others around you: Learn how to safely use, store and throw away your medicines at www.disposemymeds.org.          This list is accurate as of 4/27/18  5:34 PM.  Always use your most recent med list.                   Brand Name Dispense Instructions for use Diagnosis    allopurinol 300 MG tablet    ZYLOPRIM    90 tablet    Take 1 tablet (300 mg) by mouth daily    Chronic gout of right foot, unspecified cause       alum & mag hydroxide-simethicone 200-200-20 MG/5ML Susp suspension    MYLANTA/MAALOX    1 Bottle    Take 15 mLs by mouth every 4 hours as needed for indigestion    Gastroesophageal reflux disease, esophagitis presence not specified       colchicine 0.6 MG tablet    COLCRYS    30 tablet    Take 1 tablet (0.6 mg) by mouth daily    Chronic  gout of right foot, unspecified cause, CKD (chronic kidney disease) stage 3, GFR 30-59 ml/min       docusate sodium 100 MG tablet    COLACE    60 tablet    Take 100 mg by mouth 2 times daily as needed for constipation    Constipation, unspecified constipation type       guaiFENesin-dextromethorphan 100-10 MG/5ML syrup    ROBITUSSIN DM    560 mL    Take 5 mLs by mouth every 4 hours as needed for cough    Cough       metroNIDAZOLE 0.75 % topical gel    METROGEL    45 g    Apply thin layer to affected area in the morning or the evening.    Rosacea       naproxen 500 MG tablet    NAPROSYN    20 tablet    Take 1 tablet (500 mg) by mouth 2 times daily as needed for moderate pain    Bursitis of right shoulder       omeprazole 20 MG CR capsule    priLOSEC    30 capsule    Take 1 capsule (20 mg) by mouth daily    Gastroesophageal reflux disease, esophagitis presence not specified

## 2018-04-28 NOTE — PROGRESS NOTES
Nursing Notes:   Lesly Garcia, [facility]  4/27/2018  4:07 PM  Signed   name: Liang Ambrosio  Agency: [employer]  Language: Rufina   Telephone number: [phone]  Type of interpretation: Face-to-face, spoken        SUBJECTIVE  Cristina Amina Lyles is a 67 year old male with past medical history significant for    Patient Active Problem List   Diagnosis     Anxiety state     Constipation     Depressive disorder, not elsewhere classified     Esophageal reflux     Major depressive disorder, recurrent episode (H)     Health Care Home     Gout     Rosacea     Others present at the visit:   Liang Ambrosio    Presents for   Chief Complaint   Patient presents with     Musculoskeletal Problem     Pt is here for shoulder and arm pain in his R arm.  He states it has been going on for one month and was seen once before for it.  He states the medication he was given is not helping.     Medication Reconciliation     Complete.     Cough     Pt states he has had a cough for the past week and it is hard for him to sleep at night.      Patient is here for follow-up on right shoulder pain.  This is been going on now for a month.  He notices pain along the lateral side of the shoulder worse in the back than the front.  Has some weakness with lifting.  Limited range of motion.  It bothers him when he has to sleep on it at night.  Has tried the naproxen prescribed by Dr. Bauer and has not found it helpful.  No therapy as of yet.  Would like imaging done.    Pain in his knees, feet, and toes have improved.  No current gout symptoms.  He reports taking his gout medication regularly.    Has had a cold with congestion, runny nose, and cough for the last week due to.  Cough bothers him during the night.  It is gradually getting better.  He is wondering what he should take to help with this.  Also notices that his nose is red and irritated from the congestion.  Has used a cream for this in the past that was helpful.  He would like a refill  on this.    OBJECTIVE:  Vitals: /80 (BP Location: Left arm, Patient Position: Sitting, Cuff Size: Adult Regular)  Pulse 95  Temp 98.4  F (36.9  C) (Oral)  Resp 16  Wt 150 lb 3.2 oz (68.1 kg)  SpO2 96%  BMI 27.47 kg/m2  BMI= Body mass index is 27.47 kg/(m^2).  Vitals:  Vitals are reviewed and are within the normal range  Gen:  Alert, pleasant, no acute distress  Cardiac:  Regular rate and rhythm, no murmurs, rubs or gallops  Respiratory:  Lungs clear to auscultation bilaterally  Extremities: Right shoulder has some limited range of motion both with forward flexion and lateral raising.  Strength is intact and 5 out of 5.  Has a positive cross arm raise, and positive empty can test.  Positive Funez Cristóbal. Neer Neer's test.  No significant erythema or warmth.    Discussed risk and benefits with the patient.  Constent form signed and location of injection confirmed with the patient.  Injection site palpated and marked with a pen.  Surrounding area was cleaned with betadine x3.  Anesthesia with ethyl chloride spray.  1cc or 40mg of Kenalog and 4cc of 1% marcaine were injected into the right shoulder using the posterior approach.  Patient tolerated well.  Blood loss minimal.  Area was cleaned and covered with a bandaid.      Results for orders placed or performed in visit on 04/27/18   Basic Metabolic Panel ([facility])   Result Value Ref Range    Urea Nitrogen 16.4 7.0 - 21.0 mg/dL    Calcium 9.5 8.5 - 10.1 mg/dL    Chloride 101.6 98.0 - 110.0 mmol/L    Carbon Dioxide 27.1 20.0 - 32.0 mmol/L    Creatinine 1.1 0.7 - 1.3 mg/dL    Glucose 130.9 (H) 70.0 - 99.0 mg'dL    Potassium 4.1 3.2 - 4.6 mmol/dL    Sodium 137.8 132.0 - 142.0 mmol/L    GFR Estimate 71.2 >60.0 mL/min/1.7 m2    GFR Estimate If Black 86.1 >60.0 mL/min/1.7 m2   Uric Acid ([facility])   Result Value Ref Range    Uric Acid 6.2 3.0 - 8.0 mg/dL    Narrative    Test performed by:  [facility] [address] [place] [address]        ASSESSMENT AND PLAN:      Cristina was seen today for musculoskeletal problem, medication reconciliation and cough.    Diagnoses and all orders for this visit:    Right shoulder pain, unspecified chronicity.  Patient elected for a cortisone injection today.  Discussed warning signs to return.  If symptoms are not improving would recommend doing further imaging.  -     DRAIN/INJECT LARGE JOINT/BURSA  -     triamcinolone acetonide (KENALOG-40) injection 40 mg/mL ([facility])    Idiopathic chronic gout of foot without tophus, unspecified laterality.  Uric acid level is coming down.  No current symptoms.  -     Basic Metabolic Panel ([facility])  -     Uric Acid ([facility])    Special screening for malignant neoplasms, colon.  Will order for test for colonoscopy.  -     Basic Metabolic Panel ([facility])  -     Uric Acid ([facility])  -     Fecal Occult Blood - FIT, iFOB ([facility]); Future    Need for vaccination.  Pneumococcal shots given.  -     ADMIN VACCINE, INITIAL  -     Pneumococcal vaccine 13 valent PCV13 IM (Prevnar) [73813]    Rosacea.  Previously took metronidazole for rosacea.  This medication was refilled.  -     metroNIDAZOLE (METROGEL) 0.75 % topical gel; Apply thin layer to affected area in the morning or the evening.    Cough.  Cough is likely postviral.  Prescribed Robitussin-DM, and recommended tea with honey.  -     guaiFENesin-dextromethorphan (ROBITUSSIN DM) 100-10 MG/5ML syrup; Take 5 mLs by mouth every 4 hours as needed for cough        Patient Instructions   Injection in shoulder today.      May use ice or naproxen over the next few days if not getting better.      Cough syrup for cough.  Or use tea with honey    Refill on cream for the nose and face.      Return to clinic if shoulder is still causing pain and we can do an XRAY or MRI.        Cintia Ortiz

## 2018-04-29 NOTE — PROGRESS NOTES
Cristina Lyles-    Here is a copy of your lab results.  Your uric acid level looks good.  Your kidney tests looks good.  I hope the shoulder is feeling better.  Please call the clinic at [phone] if you have any questions.      Cintia Ortiz    Please send results to patient.

## 2018-11-15 DIAGNOSIS — N18.30 CKD (CHRONIC KIDNEY DISEASE) STAGE 3, GFR 30-59 ML/MIN (H): ICD-10-CM

## 2018-11-15 DIAGNOSIS — M1A.0710 CHRONIC GOUT OF RIGHT FOOT, UNSPECIFIED CAUSE: ICD-10-CM

## 2018-11-15 RX ORDER — COLCHICINE 0.6 MG/1
0.6 TABLET ORAL DAILY
Qty: 30 TABLET | Refills: 5 | Status: SHIPPED | OUTPATIENT
Start: 2018-11-15 | End: 2019-03-15

## 2018-12-12 ENCOUNTER — OFFICE VISIT (OUTPATIENT)
Dept: FAMILY MEDICINE | Facility: CLINIC | Age: 68
End: 2018-12-12
Payer: COMMERCIAL

## 2018-12-12 VITALS
HEART RATE: 77 BPM | BODY MASS INDEX: 26.26 KG/M2 | TEMPERATURE: 97.6 F | WEIGHT: 143.6 LBS | SYSTOLIC BLOOD PRESSURE: 124 MMHG | RESPIRATION RATE: 16 BRPM | DIASTOLIC BLOOD PRESSURE: 74 MMHG | OXYGEN SATURATION: 97 %

## 2018-12-12 DIAGNOSIS — M25.511 CHRONIC RIGHT SHOULDER PAIN: Primary | ICD-10-CM

## 2018-12-12 DIAGNOSIS — G89.29 CHRONIC RIGHT SHOULDER PAIN: Primary | ICD-10-CM

## 2018-12-12 ASSESSMENT — ANXIETY QUESTIONNAIRES
GAD7 TOTAL SCORE: 2
2. NOT BEING ABLE TO STOP OR CONTROL WORRYING: SEVERAL DAYS
6. BECOMING EASILY ANNOYED OR IRRITABLE: SEVERAL DAYS
5. BEING SO RESTLESS THAT IT IS HARD TO SIT STILL: NOT AT ALL
3. WORRYING TOO MUCH ABOUT DIFFERENT THINGS: NOT AT ALL
7. FEELING AFRAID AS IF SOMETHING AWFUL MIGHT HAPPEN: NOT AT ALL
1. FEELING NERVOUS, ANXIOUS, OR ON EDGE: NOT AT ALL
IF YOU CHECKED OFF ANY PROBLEMS ON THIS QUESTIONNAIRE, HOW DIFFICULT HAVE THESE PROBLEMS MADE IT FOR YOU TO DO YOUR WORK, TAKE CARE OF THINGS AT HOME, OR GET ALONG WITH OTHER PEOPLE: NOT DIFFICULT AT ALL

## 2018-12-12 ASSESSMENT — PATIENT HEALTH QUESTIONNAIRE - PHQ9
5. POOR APPETITE OR OVEREATING: NOT AT ALL
SUM OF ALL RESPONSES TO PHQ QUESTIONS 1-9: 7

## 2018-12-12 NOTE — NURSING NOTE
Due to patient being non-English speaking/uses sign language, an  was used for this visit. Only for face-to-face interpretation by an external agency, date and length of interpretation can be found on the scanned worksheet.     name: Mirta ID: [id number]  Agency: [employer]  Language: Zulayong   Telephone number:   Type of interpretation: Telemedicine, spoken

## 2018-12-12 NOTE — PATIENT INSTRUCTIONS
PHYSICAL THERAPY REFERRAL   December 12, 2018 at 2:51 am Demographics and referral for Physical Therapy faxed to [facility] at [phone] who will contact patient to schedule. [facility]  Phone: [phone]  Fax: [phone]  Scheduling Hours: Monday - Friday, 7 am to 4:30 pm

## 2018-12-12 NOTE — PROGRESS NOTES
Preceptor Attestation:   Patient seen, evaluated and discussed with the resident. I have verified the content of the note, which accurately reflects my assessment of the patient and the plan of care.   Supervising Physician:  Gustavo Waite MD

## 2018-12-12 NOTE — PROGRESS NOTES
SUBJECTIVE       Cristina Lyles is a 68 year old  male with a PMH significant for:     Patient Active Problem List   Diagnosis     Anxiety state     Constipation     Depressive disorder, not elsewhere classified     Esophageal reflux     Major depressive disorder, recurrent episode (H)     Health Care Home     Gout     Rosacea     He presents with right shoulder pain.  He said he originally developed pain back in May.  He was seen in clinic by Dr. Ortiz and given a shot.  The pain and his motion improved until September when it became acutely worse.  Patient states he has been unable to lift his arm.  He locates the pain at the joint line and rates it as a 5 out of 10.  He states it causes numbness and tingling and radiates down his arm.  Patient has not taken anything for the pain.  He states is often worse in the morning and gets better throughout the day.  Patient unable to sleep on his right side.  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  He denies any neck pain.  Patient states a few years ago this happened on the left side.  He was seen by PT which helped very little.  He is then seen by Ortho and received a shot.  Patient has not had pain since that shot.    PMH, Medications and Allergies were reviewed and updated as needed.        REVIEW OF SYSTEMS     CONSTITUTIONAL: NEGATIVE for fever, chills, change in weight  INTEGUMENTARY/SKIN: NEGATIVE for worrisome rashes, moles or lesions  EYES: NEGATIVE for vision changes or irritation  ENT/MOUTH: NEGATIVE for ear, mouth and throat problems  RESP: NEGATIVE for significant cough or SOB  GI: NEGATIVE for nausea, abdominal pain, heartburn, or change in bowel habits  MUSCULOSKELETAL: NEGATIVE for jonathon other significant arthralgias or myalgia  NEURO: NEGATIVE for weakness or dizziness.        OBJECTIVE     Vitals:    12/12/18 1014   BP: 124/74   Pulse: 77   Resp: 16   Temp: 97.6  F (36.4  C)   TempSrc: Oral   SpO2: 97%   Weight: 65.1 kg (143 lb 9.6 oz)     Body mass  index is 26.26 kg/m .    Constitutional: Awake, alert, cooperative, no apparent distress, and appears stated age.  Eyes: Lids and lashes normal, sclera clear, conjunctiva normal.  ENT: Normocephalic, without obvious abnormality, atramatic,   Lungs: No increased work of breathing, good air exchange, clear to auscultation bilaterally, no crackles or wheezing.  Cardiovascular: Regular rate and rhythm, normal S1 and S2, no S3 or S4, and no murmur noted.  Musculoskeletal: Normal skin overlying shoulders.  Shoulders appear to be level.  No change in muscle mass bilaterally.  Pain to palpation of right joint space. active range of motion slightly limited in abduction and flexion.  Pain with internal and external rotation, abduction, adduction, flexion, extension.  Positive Neer and hawking test. Negative apprehension.   Neurologic: Awake, alert, oriented to name, place and time.  Cranial nerves II-XII are grossly intact and gait is normal.    No results found for this or any previous visit (from the past 24 hour(s)).     Shoulder X-Ray: Smooth joint lines with good joint space. No arthritis or spurs noted.     ASSESSMENT AND PLAN     Cristina was seen today for pain and recheck medication.    Diagnoses and all orders for this visit:    Chronic right shoulder pain: x-ray not concerning for arthritis or gout. Exam not consistent with any rotator cuff injuries. Encouraged patient to take naproxen for 2 weeks BID and advised patient to try PT. If not improving will consider referral for ortho.   -     XR Shoulder Right G/E 3 Views  -     naproxen (NAPROSYN) 375 MG tablet; Take 1 tablet (375 mg) by mouth 2 times daily (with meals)  -     PHYSICAL THERAPY REFERRAL; Future    RTC in once starting PT or sooner if develops new or worsening symptoms.  I discussed the patient with  who is in agreement with the assessment and plan.   Lesly Perry

## 2018-12-13 ENCOUNTER — AMBULATORY - HEALTHEAST (OUTPATIENT)
Dept: ADMINISTRATIVE | Facility: REHABILITATION | Age: 68
End: 2018-12-13

## 2018-12-13 DIAGNOSIS — G89.29 CHRONIC RIGHT SHOULDER PAIN: ICD-10-CM

## 2018-12-13 DIAGNOSIS — M25.511 CHRONIC RIGHT SHOULDER PAIN: ICD-10-CM

## 2018-12-13 ASSESSMENT — ANXIETY QUESTIONNAIRES: GAD7 TOTAL SCORE: 2

## 2018-12-21 ENCOUNTER — TRANSFERRED RECORDS (OUTPATIENT)
Dept: HEALTH INFORMATION MANAGEMENT | Facility: CLINIC | Age: 68
End: 2018-12-21

## 2018-12-29 ENCOUNTER — TRANSFERRED RECORDS (OUTPATIENT)
Dept: HEALTH INFORMATION MANAGEMENT | Facility: CLINIC | Age: 68
End: 2018-12-29

## 2019-01-29 ENCOUNTER — TRANSFERRED RECORDS (OUTPATIENT)
Dept: HEALTH INFORMATION MANAGEMENT | Facility: CLINIC | Age: 69
End: 2019-01-29

## 2019-02-27 ENCOUNTER — DOCUMENTATION ONLY (OUTPATIENT)
Dept: FAMILY MEDICINE | Facility: CLINIC | Age: 69
End: 2019-02-27

## 2019-03-05 NOTE — PROGRESS NOTES
Visit to the client's home for annual health risk assessment.  An  was present.    Current situation/living environment  Lives with family members    Activities of daily living (ADL)/instrumental activities of daily living (IADL) and functional issues  Client needs help with the following ADL's: none  Client needs help with the following IADL's: none  Client states he is unable to perform the above due to n/a      Health concerns for today  None  Has patient fallen 2 or more times in the last year? No  Has patient fallen with injury in the last year? No    Cognition/mental health  No Concerns    STARS/Med Adherence  Client is non-compliant with the following quality measures: N/A, client is up to date  Comments: n/a    Client's Plan of Care consists of:  None

## 2019-03-14 DIAGNOSIS — N18.2 CKD (CHRONIC KIDNEY DISEASE) STAGE 2, GFR 60-89 ML/MIN: ICD-10-CM

## 2019-03-14 DIAGNOSIS — M1A.0790 IDIOPATHIC CHRONIC GOUT OF FOOT WITHOUT TOPHUS, UNSPECIFIED LATERALITY: Primary | ICD-10-CM

## 2019-03-15 ENCOUNTER — OFFICE VISIT (OUTPATIENT)
Dept: FAMILY MEDICINE | Facility: CLINIC | Age: 69
End: 2019-03-15
Payer: COMMERCIAL

## 2019-03-15 VITALS
DIASTOLIC BLOOD PRESSURE: 84 MMHG | HEART RATE: 72 BPM | TEMPERATURE: 97.9 F | SYSTOLIC BLOOD PRESSURE: 126 MMHG | RESPIRATION RATE: 16 BRPM | OXYGEN SATURATION: 98 %

## 2019-03-15 DIAGNOSIS — G89.29 CHRONIC RIGHT SHOULDER PAIN: ICD-10-CM

## 2019-03-15 DIAGNOSIS — L71.9 ACNE ROSACEA: Primary | ICD-10-CM

## 2019-03-15 DIAGNOSIS — N18.30 CKD (CHRONIC KIDNEY DISEASE) STAGE 3, GFR 30-59 ML/MIN (H): ICD-10-CM

## 2019-03-15 DIAGNOSIS — M1A.0710 CHRONIC GOUT OF RIGHT FOOT, UNSPECIFIED CAUSE: ICD-10-CM

## 2019-03-15 DIAGNOSIS — M25.511 CHRONIC RIGHT SHOULDER PAIN: ICD-10-CM

## 2019-03-15 LAB
BUN SERPL-MCNC: 15.8 MG/DL (ref 7–21)
CALCIUM SERPL-MCNC: 9.3 MG/DL (ref 8.5–10.1)
CHLORIDE SERPLBLD-SCNC: 103.9 MMOL/L (ref 98–110)
CO2 SERPL-SCNC: 30.1 MMOL/L (ref 20–32)
CREAT SERPL-MCNC: 1.2 MG/DL (ref 0.7–1.3)
GFR SERPL CREATININE-BSD FRML MDRD: 64.2 ML/MIN/1.7 M2
GLUCOSE SERPL-MCNC: 99.1 MG'DL (ref 70–99)
POTASSIUM SERPL-SCNC: 3.9 MMOL/DL (ref 3.2–4.6)
SODIUM SERPL-SCNC: 141.5 MMOL/L (ref 132–142)
URATE SERPL-MCNC: 6.7 MG/DL (ref 3–8)

## 2019-03-15 RX ORDER — COLCHICINE 0.6 MG/1
0.6 TABLET ORAL DAILY
Qty: 30 TABLET | Refills: 5 | Status: SHIPPED | OUTPATIENT
Start: 2019-03-15 | End: 2019-04-16

## 2019-03-15 RX ORDER — ALLOPURINOL 300 MG/1
300 TABLET ORAL DAILY
Qty: 90 TABLET | Refills: 11 | Status: SHIPPED | OUTPATIENT
Start: 2019-03-15 | End: 2019-04-16

## 2019-03-15 NOTE — PATIENT INSTRUCTIONS
Take Naproxen, 1 pill 2x per day for next 2 weeks.    Schedule physical therapy  We will get MRI results, and notes from the other doctor    This will take a while to get better, so be patient and continue to follow up regularly.      For nose:    BUY your own lotion:  Aquaphor, Eucerin, Aveeno, Cera Va.  Use 2x per day     other cream and use 2x per day:  Metronidazole.      March 15, 2019    Referral for Physical Therapy has been faxed internally, they will contact patient to schedule appointment.  AS

## 2019-03-15 NOTE — NURSING NOTE
Due to patient being non-English speaking/uses sign language, an  was used for this visit. Only for face-to-face interpretation by an external agency, date and length of interpretation can be found on the scanned worksheet.       name: Liang Daily  Language: Rufina  Agency:  [employer]  Telephone number: [phone]  Type of interpretation:  Face-to-face, spoken

## 2019-03-15 NOTE — PROGRESS NOTES
Nursing Notes:   Lesly Garcia, SOFIE  3/15/2019  8:17 AM  Signed  Due to patient being non-English speaking/uses sign language, an  was used for this visit. Only for face-to-face interpretation by an external agency, date and length of interpretation can be found on the scanned worksheet.       name: Liang Ambrosio  Language: Rufina  Agency:  [employer]  Telephone number: [phone]  Type of interpretation:  Face-to-face, spoken      SUBJECTIVE  Cristina Amina Lyles is a 68 year old male with past medical history significant for    Patient Active Problem List   Diagnosis     Anxiety state     Constipation     Depressive disorder, not elsewhere classified     Esophageal reflux     Major depressive disorder, recurrent episode (H)     Health Care Home     Gout     Rosacea     Others present at the visit:   Liang Daily    Presents for   Chief Complaint   Patient presents with     Shoulder Pain     Pt states he wants to discuss the shoulder injection today.  He states the last time he got one, it went numb for two weeks and then the pain came back.  He states he has not yet tried PT.     Medication Reconciliation     Complete.      Derm Problem     Pt would also like to discuss facial rash and pain.      Patient is here today to discuss a couple of issues.    1) chronic persistent right shoulder pain.  This pain is been going on for many years.  Initially, he hurt this during the war back in [place].  In the past, he says he went to a clinic outside of [facility] and had an injection, which caused good improvement in symptoms.  We also did an injection this past April.  This only improved his symptoms for about 2 weeks.  He states he also received an injection in December at [facility], and only had about 1 week of improvement.  He would like something to help the pain get better.    Pain is located in the anterior shoulder.  It is worse with movement, especially with twisting,  reaching over behind his head and up behind his back.  Makes it hard to scratch or reach things.  Hard to lift his shoulder up above his head.  He gets occasional tingling that shoots down his arm but no shooting pain.  He describes feeling weak in the shoulder but no decrease in grasp strength.  No loss of fine motor skills.  No swelling, warmth, or rash.  He shares that he had an MRI completed at [facility], but he does not know what the results were of that test.  He did see Dr. Perry here and was recommended to take naproxen and do physical therapy, but he did not do therapy, and does not think he received the naproxen medication.  He is open to trying this today.    We had a long discussion that this is now chronic injury, and that it may take some time for his symptoms to fully improve.    2) facial rash.  Was outside shoveling, and his face became more red.  It is painful and sometimes feels like burning.  He comments that his skin is more dried out.  He was given metronidazole gel in the past, and this helped previously, but he thinks the medicine is  and is no longer working.    3) he denies any trouble with his gout.  Is not having any gout pain.  Reports taking his gout medicine regularly.  He is not taking his stomach medication, as he denies any trouble with this today, and feels he is taking too many meds.  Does not have his medications with him but will bring them to his next visit.    OBJECTIVE:  Vitals: /84 (BP Location: Left arm, Patient Position: Sitting, Cuff Size: Adult Regular)   Pulse 72   Temp 97.9  F (36.6  C) (Oral)   Resp 16   SpO2 98%   BMI= There is no height or weight on file to calculate BMI.  Vitals:  Vitals are reviewed and are within the normal range  Gen:  Alert, pleasant, no acute distress  HEENT: Erythema, and some scaling across the bilateral cheeks as well as the nose.  Consistent with rosacea, but more irritated than normal for him.  Cardiac:  Regular  rate and rhythm, no murmurs, rubs or gallops  Respiratory:  Lungs clear to auscultation bilaterally  Right shoulder: He has significant tenderness over the AC joint.  Some pain with palpation of the clavicle.  Mild tenderness over the biceps tendon insertion point.  No scapular pain.  No humeral pain.  He has limited range of motion with abduction, is unable to lift his hand behind his back, and has difficulty with reaching over the top.  Normal internal rotation, limited external rotation.  Good strength, 5 out of 5 both in shoulders biceps triceps hands and .    ASSESSMENT AND PLAN:      Cristina was seen today for shoulder pain, medication reconciliation and derm problem.    Diagnoses and all orders for this visit:    Acne rosacea we will try some metronidazole gel for the acne rosacea.,  Also recommended using a good moisturizer twice daily and esdras recommended names including Eucerin, Aveeno, CeraVe, or Aquaphor.  -     metroNIDAZOLE (METROCREAM) 0.75 % external cream; Apply topically 2 times daily    Chronic right shoulder pain.  Right shoulder pain likely with underlying strain or partial rotator cuff tear.  We will obtain these results from [place].  MAKSIM was signed today.  Will use naproxen scheduled for anti-inflammatory.  Will refer for physical therapy.  I think there is a component of frozen shoulder as well, and therapy will help significantly with this.  Discussed that this is going to be a long-term investment and improvement of his pain and that he will need regular follow-up.  -     PHYSICAL THERAPY REFERRAL; Future  -     naproxen (NAPROSYN) 375 MG tablet; Take 1 tablet (375 mg) by mouth 2 times daily (with meals)    Chronic gout of right foot, unspecified cause.  We will check his uric acid and kidney function test today.  He reports taking the allopurinol regularly and has no symptoms.  -     allopurinol (ZYLOPRIM) 300 MG tablet; Take 1 tablet (300 mg) by mouth daily  -     colchicine (COLCRYS)  0.6 MG tablet; Take 1 tablet (0.6 mg) by mouth daily  -     Uric Acid ([facility])    CKD (chronic kidney disease) stage 3, GFR 30-59 ml/min (H).  We will recheck creatinine given her underlying mild chronic kidney disease, as well as intermittent NSAID use.  Will give a just a limited prescription of naproxen.  -     colchicine (COLCRYS) 0.6 MG tablet; Take 1 tablet (0.6 mg) by mouth daily  -     Basic Metabolic Panel ([place])      Patient Instructions   Take Naproxen, 1 pill 2x per day for next 2 weeks.    Schedule physical therapy  We will get MRI results, and notes from the other doctor    This will take a while to get better, so be patient and continue to follow up regularly.      For nose:    BUY your own lotion:  Aquaphor, Eucerin, Aveeno, Cera Va.  Use 2x per day     other cream and use 2x per day:  Metronidazole.        Follow up in 1 month for recheck shoulder, gout, skin.  Bring in all of your medications.      Cintia Ortiz

## 2019-03-15 NOTE — LETTER
March 18, 2019      Cristina Lyles  [address]  [facility] [address]        Dear Cristina,  Here is a copy of your lab results.  Your kidney tests are stable.  Your uric acid (gout) levels are still a little high.  We'll talk about this at your follow up visit.  Please call the clinic at [phone] if you have any questions.     Please see below for your test results.    Resulted Orders   Basic Metabolic Panel ([place])   Result Value Ref Range    Urea Nitrogen 15.8 7.0 - 21.0 mg/dL    Calcium 9.3 8.5 - 10.1 mg/dL    Chloride 103.9 98.0 - 110.0 mmol/L    Carbon Dioxide 30.1 20.0 - 32.0 mmol/L    Creatinine 1.2 0.7 - 1.3 mg/dL    Glucose 99.1 (H) 70.0 - 99.0 mg'dL    Potassium 3.9 3.2 - 4.6 mmol/dL    Sodium 141.5 132.0 - 142.0 mmol/L    GFR Estimate 64.2 >60.0 mL/min/1.7 m2    GFR Estimate If Black 77.7 >60.0 mL/min/1.7 m2   Uric Acid ([facility])   Result Value Ref Range    Uric Acid 6.7 3.0 - 8.0 mg/dL    Narrative    Test performed by:  [facility]  [address] [place] [address]       If you have any questions, please call the clinic to make an appointment.    Sincerely,    Cintia Ortiz MD

## 2019-03-17 NOTE — RESULT ENCOUNTER NOTE
Cristina Lyles-    Here is a copy of your lab results.  Your kidney tests are stable.  Your uric acid (gout) levels are still a little high.  We'll talk about this at your follow up visit.  Please call the clinic at [phone] if you have any questions.      Cintia Ortiz    Please send results to patient.

## 2019-03-18 ENCOUNTER — AMBULATORY - HEALTHEAST (OUTPATIENT)
Dept: ADMINISTRATIVE | Facility: REHABILITATION | Age: 69
End: 2019-03-18

## 2019-03-18 DIAGNOSIS — M25.511 CHRONIC RIGHT SHOULDER PAIN: ICD-10-CM

## 2019-03-18 DIAGNOSIS — G89.29 CHRONIC RIGHT SHOULDER PAIN: ICD-10-CM

## 2019-03-25 ENCOUNTER — OFFICE VISIT - HEALTHEAST (OUTPATIENT)
Dept: PHYSICAL THERAPY | Facility: REHABILITATION | Age: 69
End: 2019-03-25

## 2019-03-25 DIAGNOSIS — G89.29 CHRONIC RIGHT SHOULDER PAIN: ICD-10-CM

## 2019-03-25 DIAGNOSIS — R29.3 POOR POSTURE: ICD-10-CM

## 2019-03-25 DIAGNOSIS — M25.511 CHRONIC RIGHT SHOULDER PAIN: ICD-10-CM

## 2019-03-27 ENCOUNTER — OFFICE VISIT - HEALTHEAST (OUTPATIENT)
Dept: PHYSICAL THERAPY | Facility: REHABILITATION | Age: 69
End: 2019-03-27

## 2019-03-27 DIAGNOSIS — G89.29 CHRONIC RIGHT SHOULDER PAIN: ICD-10-CM

## 2019-03-27 DIAGNOSIS — R29.3 POOR POSTURE: ICD-10-CM

## 2019-03-27 DIAGNOSIS — M25.511 CHRONIC RIGHT SHOULDER PAIN: ICD-10-CM

## 2019-04-03 ENCOUNTER — OFFICE VISIT - HEALTHEAST (OUTPATIENT)
Dept: PHYSICAL THERAPY | Facility: REHABILITATION | Age: 69
End: 2019-04-03

## 2019-04-03 DIAGNOSIS — M25.511 CHRONIC RIGHT SHOULDER PAIN: ICD-10-CM

## 2019-04-03 DIAGNOSIS — R29.3 POOR POSTURE: ICD-10-CM

## 2019-04-03 DIAGNOSIS — G89.29 CHRONIC RIGHT SHOULDER PAIN: ICD-10-CM

## 2019-04-08 ENCOUNTER — OFFICE VISIT - HEALTHEAST (OUTPATIENT)
Dept: PHYSICAL THERAPY | Facility: REHABILITATION | Age: 69
End: 2019-04-08

## 2019-04-08 DIAGNOSIS — G89.29 CHRONIC RIGHT SHOULDER PAIN: ICD-10-CM

## 2019-04-08 DIAGNOSIS — M25.511 CHRONIC RIGHT SHOULDER PAIN: ICD-10-CM

## 2019-04-08 DIAGNOSIS — R29.3 POOR POSTURE: ICD-10-CM

## 2019-04-15 ENCOUNTER — OFFICE VISIT - HEALTHEAST (OUTPATIENT)
Dept: PHYSICAL THERAPY | Facility: REHABILITATION | Age: 69
End: 2019-04-15

## 2019-04-15 DIAGNOSIS — G89.29 CHRONIC RIGHT SHOULDER PAIN: ICD-10-CM

## 2019-04-15 DIAGNOSIS — M25.511 CHRONIC RIGHT SHOULDER PAIN: ICD-10-CM

## 2019-04-15 DIAGNOSIS — R29.3 POOR POSTURE: ICD-10-CM

## 2019-04-16 ENCOUNTER — OFFICE VISIT (OUTPATIENT)
Dept: FAMILY MEDICINE | Facility: CLINIC | Age: 69
End: 2019-04-16
Payer: COMMERCIAL

## 2019-04-16 VITALS
OXYGEN SATURATION: 97 % | TEMPERATURE: 97.5 F | DIASTOLIC BLOOD PRESSURE: 90 MMHG | WEIGHT: 151.8 LBS | SYSTOLIC BLOOD PRESSURE: 142 MMHG | HEIGHT: 63 IN | HEART RATE: 67 BPM | RESPIRATION RATE: 16 BRPM | BODY MASS INDEX: 26.9 KG/M2

## 2019-04-16 DIAGNOSIS — M25.511 CHRONIC RIGHT SHOULDER PAIN: ICD-10-CM

## 2019-04-16 DIAGNOSIS — M1A.0710 CHRONIC GOUT OF RIGHT FOOT, UNSPECIFIED CAUSE: ICD-10-CM

## 2019-04-16 DIAGNOSIS — G89.29 CHRONIC RIGHT SHOULDER PAIN: ICD-10-CM

## 2019-04-16 DIAGNOSIS — L71.9 ACNE ROSACEA: Primary | ICD-10-CM

## 2019-04-16 DIAGNOSIS — R03.0 ELEVATED BLOOD PRESSURE READING WITHOUT DIAGNOSIS OF HYPERTENSION: ICD-10-CM

## 2019-04-16 DIAGNOSIS — N18.30 CKD (CHRONIC KIDNEY DISEASE) STAGE 3, GFR 30-59 ML/MIN (H): ICD-10-CM

## 2019-04-16 RX ORDER — ALLOPURINOL 300 MG/1
450 TABLET ORAL DAILY
Qty: 135 TABLET | Refills: 3 | Status: SHIPPED | OUTPATIENT
Start: 2019-04-16 | End: 2020-06-02

## 2019-04-16 RX ORDER — COLCHICINE 0.6 MG/1
0.6 TABLET ORAL DAILY
Qty: 30 TABLET | Refills: 5 | Status: SHIPPED | OUTPATIENT
Start: 2019-04-16 | End: 2020-06-02

## 2019-04-16 ASSESSMENT — MIFFLIN-ST. JEOR: SCORE: 1352.3

## 2019-04-16 NOTE — PATIENT INSTRUCTIONS
Increase the Allopurinol Gout medication to 1.5 pills daily.    Continue to take the colchicine, 1 pill daily.  Use the Naproxen as need for shoulder pain.    Continue to do exercises daily.

## 2019-04-16 NOTE — PROGRESS NOTES
Nursing Notes:   Lesly Garcia, SOFIE  4/16/2019  8:31 AM  Signed   name: Blanca Canchola  Language: [employer]  Agency: [employer]  Phone number: [phone]    Due to patient being non-English speaking/uses sign language, an  was used for this visit. Only for face-to-face interpretation by an external agency, date and length of interpretation can be found on the scanned worksheet.     name: Blanca Canchola  Agency: [employer]  Language: [employer]   Telephone number: [phone]  Type of interpretation: Face-to-face, spoken        SUBJECTIVE  Cristina Amina Lyles is a 68 year old male with past medical history significant for    Patient Active Problem List   Diagnosis     Anxiety state     Constipation     Depressive disorder, not elsewhere classified     Esophageal reflux     Major depressive disorder, recurrent episode (H)     Health Care Home     Gout     Rosacea     Others present at the visit:   Blanca Canchola    Presents for   Chief Complaint   Patient presents with     Shoulder Pain     Pt is here to follow up on his shoulder pain.  Pt states that his range of motion is improving and the pain is getting better but he finished his last session of PT yesterday. Pt states he would like more PT but he has a copay every time he goes and cannot afford it.      Colonoscopy     Pt will take a fit test home today.     Presents for follow-up on right shoulder pain.  Has been going to physical therapy and finds this helpful.  Has been able to increase range of motion, including reaching his arm up, out, and across his chest.  Still has difficulties lifting his arm behind his back.  Pain was previously as high as 8 on a regular basis now it is much less more like 3-4.  He is sleeping better although he cannot lay consistently on his right shoulder to sleep.  He is doing 15 minutes of stretching and strengthening exercises with bands at home twice daily, and just completed his last physical  "therapy appointment.  He shares that the co-pay cost for the physical therapy was somewhat prohibitive for him.    He is not sure if the naproxen has been helping.  He is taking 2 pills together once per day.  We discussed options and he would like to go to as-needed basis.  Denies any abdominal pain from this.    Continues to have some pain in his great toe from his chronic gout.  This is been more achy as of late.  He is taking the colchicine 1 pill and the allopurinol 1 pill every day.  Last bad gouty flare was in November.  We discussed his uric acid level at our last visit which was elevated at 6.7.  He is interested in increasing the allopurinol dose to see if this improves his pain.    Blood pressure is somewhat high today.  He denies chest pain, shortness of breath, but shares that he did not sleep well last night and has been up since 3 AM.  I did recheck it and it remained elevated at 146/94.  No history of hypertension previously.    Use the metronidazole cream to help with rash on his face, and this has since resolved.    He is interested in scheduling his Medicare wellness visit as well as doing a fit test today.      OBJECTIVE:  Vitals: /90 (BP Location: Left arm, Patient Position: Sitting, Cuff Size: Adult Regular)   Pulse 67   Temp 97.5  F (36.4  C) (Oral)   Resp 16   Ht 1.59 m (5' 2.6\")   Wt 68.9 kg (151 lb 12.8 oz)   SpO2 97%   BMI 27.24 kg/m    BMI= Body mass index is 27.24 kg/m .  Vitals:  Vitals are reviewed and blood pressure is slightly elevated.  He remains elevated at 146/94 on recheck.  Gen:  Alert, pleasant, no acute distress  HEENT: Skin across nose, cheeks, and upper lips has improved.  No significant erythema or peeling today.  Cardiac:  Regular rate and rhythm, no murmurs, rubs or gallops  Respiratory:  Lungs clear to auscultation bilaterally  Extremities: Right shoulder with increase range of motion including raising his hand above his head behind his head and up his " back.  Strength remains intact.    Results for orders placed or performed in visit on 03/15/19   Basic Metabolic Panel ([place])   Result Value Ref Range    Urea Nitrogen 15.8 7.0 - 21.0 mg/dL    Calcium 9.3 8.5 - 10.1 mg/dL    Chloride 103.9 98.0 - 110.0 mmol/L    Carbon Dioxide 30.1 20.0 - 32.0 mmol/L    Creatinine 1.2 0.7 - 1.3 mg/dL    Glucose 99.1 (H) 70.0 - 99.0 mg'dL    Potassium 3.9 3.2 - 4.6 mmol/dL    Sodium 141.5 132.0 - 142.0 mmol/L    GFR Estimate 64.2 >60.0 mL/min/1.7 m2    GFR Estimate If Black 77.7 >60.0 mL/min/1.7 m2   Uric Acid ([facility])   Result Value Ref Range    Uric Acid 6.7 3.0 - 8.0 mg/dL    Narrative    Test performed by:  [facility] [address] [place] [address]           ASSESSMENT AND PLAN:      Cristina was seen today for shoulder pain and colonoscopy.    Diagnoses and all orders for this visit:    Acne rosacea    Chronic right shoulder pain.  Right shoulder pain has improved significantly.  Still with some pain.  He will continue his home exercise regimen, and will change the naproxen to as needed.  He will let me know if he is needing further physical therapy appointments to help keep the pain under better control.  -     naproxen (NAPROSYN) 375 MG tablet; Take 1 tablet (375 mg) by mouth 2 times daily as needed for moderate pain    Chronic gout of right foot, unspecified cause.  Still having right great toe pain, and uric acid level is above goal.  We will titrate him up from 300 mg to 350 mg daily of allopurinol.  Continue with the colchicine.  Last bad flare was in November.  -     allopurinol (ZYLOPRIM) 300 MG tablet; Take 1.5 tablets (450 mg) by mouth daily  -     colchicine (COLCRYS) 0.6 MG tablet; Take 1 tablet (0.6 mg) by mouth daily    CKD (chronic kidney disease) stage 3, GFR 30-59 ml/min (H).  His chronic kidney disease is stable at 1.2.  We will try to limit NSAIDs, and continue with the colchicine for prophylaxis of gout.  -     colchicine (COLCRYS)  0.6 MG tablet; Take 1 tablet (0.6 mg) by mouth daily    Elevated blood pressure reading without diagnosis of hypertension.  Blood pressure is elevated today.  I think this is due to lack of sleep.  Blood pressure the last few visits have been quite good.      We will recheck blood pressure at his upcoming Medicare wellness visit in 4-6 weeks.  We will also recheck his uric acid levels at that time.        Patient Instructions   Increase the Allopurinol Gout medication to 1.5 pills daily.    Continue to take the colchicine, 1 pill daily.  Use the Naproxen as need for shoulder pain.    Continue to do exercises daily.      Cintia Ortiz

## 2019-04-16 NOTE — NURSING NOTE
name: Blanca Peters  Language: Rufina  Agency: [employer]  Phone number: [phone]    Due to patient being non-English speaking/uses sign language, an  was used for this visit. Only for face-to-face interpretation by an external agency, date and length of interpretation can be found on the scanned worksheet.     name: Blanca Peters  Agency: [employer]  Language: [employer]   Telephone number: [phone]  Type of interpretation: Face-to-face, spoken

## 2019-04-19 DIAGNOSIS — M1A.0790 IDIOPATHIC CHRONIC GOUT OF FOOT WITHOUT TOPHUS, UNSPECIFIED LATERALITY: ICD-10-CM

## 2019-04-19 DIAGNOSIS — Z12.11 SPECIAL SCREENING FOR MALIGNANT NEOPLASMS, COLON: ICD-10-CM

## 2019-04-19 LAB — HEMOCCULT STL QL IA: NEGATIVE

## 2019-04-19 NOTE — LETTER
April 19, 2019      Cristina Lyles  [address]  [place] [address]        Dear Cristina,    Your FIT testing for colon cancer came back negative.  This is good news!  We should recheck in 1 year.   Please call the clinic at [phone] if you have any questions.       Please see below for your test results.    Resulted Orders   Fecal Occult Blood - FIT, iFOB ([facility])   Result Value Ref Range    Occult Blood Scn FIT NEGATIVE Negative       If you have any questions, please call the clinic to make an appointment.    Sincerely,    [facility]

## 2019-04-19 NOTE — RESULT ENCOUNTER NOTE
Cristina Lyles-    Your FIT testing for colon cancer came back negative.  This is good news!  We should recheck in 1 year.   Please call the clinic at [phone] if you have any questions.      Cintia Ortiz    Please send results to patient.

## 2019-05-14 ENCOUNTER — OFFICE VISIT (OUTPATIENT)
Dept: FAMILY MEDICINE | Facility: CLINIC | Age: 69
End: 2019-05-14
Payer: COMMERCIAL

## 2019-05-14 VITALS
SYSTOLIC BLOOD PRESSURE: 130 MMHG | OXYGEN SATURATION: 98 % | RESPIRATION RATE: 16 BRPM | HEART RATE: 75 BPM | WEIGHT: 148 LBS | HEIGHT: 62 IN | TEMPERATURE: 98.2 F | BODY MASS INDEX: 27.23 KG/M2 | DIASTOLIC BLOOD PRESSURE: 83 MMHG

## 2019-05-14 DIAGNOSIS — Z00.00 PREVENTATIVE HEALTH CARE: ICD-10-CM

## 2019-05-14 DIAGNOSIS — Z00.00 WELLNESS EXAMINATION: Primary | ICD-10-CM

## 2019-05-14 DIAGNOSIS — M1A.0790 IDIOPATHIC CHRONIC GOUT OF FOOT WITHOUT TOPHUS, UNSPECIFIED LATERALITY: ICD-10-CM

## 2019-05-14 DIAGNOSIS — L30.1 DYSHIDROTIC ECZEMA: ICD-10-CM

## 2019-05-14 LAB
BUN SERPL-MCNC: 19.6 MG/DL (ref 7–21)
CALCIUM SERPL-MCNC: 9 MG/DL (ref 8.5–10.1)
CHLORIDE SERPLBLD-SCNC: 106.9 MMOL/L (ref 98–110)
CHOLEST SERPL-MCNC: 161.3 MG/DL (ref 0–200)
CHOLEST/HDLC SERPL: 3.6 {RATIO} (ref 0–5)
CO2 SERPL-SCNC: 30.1 MMOL/L (ref 20–32)
CREAT SERPL-MCNC: 1.2 MG/DL (ref 0.7–1.3)
GFR SERPL CREATININE-BSD FRML MDRD: 64.2 ML/MIN/1.7 M2
GLUCOSE SERPL-MCNC: 103.1 MG'DL (ref 70–99)
HDLC SERPL-MCNC: 44.8 MG/DL
LDLC SERPL CALC-MCNC: 81 MG/DL (ref 0–129)
POTASSIUM SERPL-SCNC: 4.2 MMOL/DL (ref 3.2–4.6)
SODIUM SERPL-SCNC: 143.1 MMOL/L (ref 132–142)
TRIGL SERPL-MCNC: 175.5 MG/DL (ref 0–150)
URATE SERPL-MCNC: 6 MG/DL (ref 3–8)
VLDL CHOLESTEROL: 35.1 MG/DL (ref 7–32)

## 2019-05-14 RX ORDER — CETIRIZINE HYDROCHLORIDE 10 MG/1
10 TABLET ORAL AT BEDTIME
Qty: 90 TABLET | Refills: 0 | Status: SHIPPED | OUTPATIENT
Start: 2019-05-14 | End: 2020-10-21

## 2019-05-14 RX ORDER — TRIAMCINOLONE ACETONIDE 1 MG/G
CREAM TOPICAL 3 TIMES DAILY
Qty: 45 G | Refills: 1 | Status: SHIPPED | OUTPATIENT
Start: 2019-05-14 | End: 2020-09-08

## 2019-05-14 ASSESSMENT — MIFFLIN-ST. JEOR: SCORE: 1328.82

## 2019-05-14 NOTE — NURSING NOTE
Medicare Wellness Visit  Health Risk Assessment        Visual Acuity:  Right Eye: 10/12.5   Left Eye: 10/10  Both Eyes: 10/8      No flowsheet data found.         Health Risk Assessment / Review of Systems     Constitutional: Any fevers or night sweats? No     Eyes:  Vision problems   No     Hearing Do you feel you have hearing loss?  No     Cardiovascular: Any chest pain, fast or irregular heart beat, calf pain with walking?     No           Respiratory:   Any breathing problems or cough?   No     Gastrointestinal: Any stomach or stool problems?   No      Genitourinary: Do you have difficulty controlling urination?   No     Muscles and Joints: Any joint stiffness or soreness?    YES     Skin: Any concerning lesions or moles?   No     Nervous System: Any loss of strength or feeling, numbness or tingling, shaking, dizziness, or headache?  No     Mental Health: Any depression, anxiety or problems sleeping?     YES     Cognition: Do you have any problems with your memory?  No     PHQ-2 Score:   PHQ-2 ( 1999 [employer]) 1/22/2018 12/27/2017   Q1: Little interest or pleasure in doing things 1 0   Q2: Feeling down, depressed or hopeless 1 0   PHQ-2 Score 2 0       PHQ-9 Score:   [facility] Follow-up to PHQ 2/23/2018 4/17/2018 12/12/2018   PHQ-9 9. Suicide Ideation past 2 weeks Not at all Not at all Not at all            Medical Care     What other specialists or organizations are involved in your medical care?    Patient Care Team       Relationship Specialty Notifications Start Cintia Shaikh MD PCP - General   1/8/13     Phone: [phone] Fax: [phone]         [address] [place] [address]    Crystal Freeman, RN Registered Nurse   12/12/18     [facility] Community Care Coordinator [phone]                 Social History / Home Safety     Social History     Tobacco Use     Smoking status: Never Smoker     Smokeless tobacco: Never Used   Substance Use Topics     Alcohol use: No     Marital Status:  Who  "lives in your household? Self, Son, Daughter-in-law, 4 grandchildren.    Does your home have any of the following safety concerns? Loose rugs in the hallway, no grab bars in the bathroom, no handrails on the stairs or have poorly lit areas?   YES No grab bars and no handrails on stairs.     Do you feel threatened or controlled by a partner, ex-partner or anyone in your life? No     Has anyone hurt you physically, for example by pushing, hitting, slapping or kicking you   or forcing you to have sex? No          Functional Status     Do you need help with dressing yourself, bathing, or walking?No     Do you need help with the phone, transportation, shopping, preparing meals, housework, laundry, medications or managing money?No       Risk Behaviors and Healthy Habits     History   Smoking Status     Never Smoker   Smokeless Tobacco     Never Used     How many servings of fruits and vegetables do you eat a day? 1    Exercise: 2-3 days/week for an average of 15-30 minutes walking      Do you frequently drive without a seatbelt? No     Do you use any other drugs? No         Do you use alcohol?No      Frailty Assessment            1. By yourself and note using aids, do you have difficulty walking up 10 steps without resting?  No  (1 for Yes, 0 for No)    2. By yourself and not using mobility aids, do you have any difficulty walking several hundred yards? No  (1 for Yes, 0 for No)    3. Have you lost 10 or more pounds unintentionally in the previous year? No  (If \"Yes\" and >5% weight loss, then score 1.  Score 0, if <5% weight loss or \"No\" weight loss)    4. How much of the times during the past 3 weeks did you feel tired? 5. None of the time (\"1\" or \"2\" are scored 1, others 0)    5.  A doctor told the patient they had the following illnesses:  None    Lesly Garcia, CMA    "

## 2019-05-14 NOTE — LETTER
May 16, 2019      Cristina Amina Lyles  [address]  [place] [facility] [address]        Dear Cristina,    Here is a copy of your lab results.  Your uric acid (gout levels) are excellent.  This is good news!  Your kidney tests are also good.  Your sodium is a little high--usually this means you should be drinking more water.  Your cholesterol is excellent.  Please call the clinic at [phone] if you have any questions.      Please see below for your test results.    Resulted Orders   Lipid Panel ([place])   Result Value Ref Range    Cholesterol 161.3 0.0 - 200.0 mg/dL    Cholesterol/HDL Ratio 3.6 0.0 - 5.0    HDL Cholesterol 44.8 >40.0 mg/dL    LDL Cholesterol Calculated 81 0 - 129 mg/dL    Triglycerides 175.5 (H) 0.0 - 150.0 mg/dL    VLDL Cholesterol 35.1 (H) 7.0 - 32.0 mg/dL   Basic Metabolic Panel ([place])   Result Value Ref Range    Urea Nitrogen 19.6 7.0 - 21.0 mg/dL    Calcium 9.0 8.5 - 10.1 mg/dL    Chloride 106.9 98.0 - 110.0 mmol/L    Carbon Dioxide 30.1 20.0 - 32.0 mmol/L    Creatinine 1.2 0.7 - 1.3 mg/dL    Glucose 103.1 (H) 70.0 - 99.0 mg'dL    Potassium 4.2 3.2 - 4.6 mmol/dL    Sodium 143.1 (H) 132.0 - 142.0 mmol/L    GFR Estimate 64.2 >60.0 mL/min/1.7 m2    GFR Estimate If Black 77.7 >60.0 mL/min/1.7 m2   Uric Acid ([facility])   Result Value Ref Range    Uric Acid 6.0 3.0 - 8.0 mg/dL    Narrative    Test performed by:  [facility]  [address] [place] [address]       If you have any questions, please call the clinic to make an appointment.    Sincerely,    Cintia Ortiz MD

## 2019-05-14 NOTE — NURSING NOTE
Due to patient being non-English speaking/uses sign language, an  was used for this visit. Only for face-to-face interpretation by an external agency, date and length of interpretation can be found on the scanned worksheet.       name: Bebo Sorenson  Language: Rufina  Agency:  [employer]  Telephone number: [phone]  Type of interpretation:  Face-to-face, spoken

## 2019-05-14 NOTE — PATIENT INSTRUCTIONS
MEDICARE PERSONAL PREVENTIVE SERVICES PLAN - IMMUNIZATIONS     Here are your recommended immunizations.  Take this home for your reference.                                                    IMMUNIZATIONS Description Recommend today?     Influenza (Flu shot) Prevents flu; should get every year No; is up to date.   PCV 13 Pneumonia vaccination; you get it once No; is up to date.   PPSV 23 Second pneumonia vaccination; usually get it 1 year after PCV 13 Yes; Recommended and ordered.   Zoster (Shingles) Prevents shingles; you get it once  (Check with Part D insurance for coverage, must receive at a pharmacy, not clinic) No; is up to date.   Tetanus Prevents tetanus; once every 10 years No; is up to date.     Talk to your family about AAA screening.  Stop in lab for blood tests  Pneumonia shot today.    Cream for hands (3x per day)  Pill for itching at night.      MEDICARE PERSONAL PREVENTIVE SERVICES PLAN - SERVICES     Review these tests with your doctor then decide which ones you want and take this page home for your reference                                                    IMMUNIZATIONS Description Recommend today?     Hepatitis B  If you have any of the following risk factors you should be immunized for hepatitis B: severe kidney disease, people who live in the same house as a carrier of Hepatitis B virus, people who live in  institutions (e.g. nursing homes or group homes), homosexual men, patients with hemophilia who received Factor VIII or IX concentrates, abusers of illicit injectable drugs No: is not indicated today.     SCREENING TESTS     Description   Year of Last Screening   Recommended Today?   Heart disease screening blood tests    Cholesterol level Reducing cholesterol can reduce your risk of heart attacks by 25%.  Screening is recommended yearly if you are at risk of heart disease otherwise every 4-5 years 2015 Yes; Recommended and ordered.   Diabetes screening tests    Hemoglobin A1c blood test    Finding and treating diabetes early can reduce complications.  Screening recommended/covered yearly if you have high blood pressure, high cholesterol, obesity (BMI >30), or a history of high blood glucose tests; or 2 of the following: family history of diabetes, overweight (BMI >25 but <30), age 65 years or older, and a history of diabetes of pregnancy or gave birth to baby weighing more than 9 lbs. 2015 Yes; Recommended and ordered.   Hepatitis B screening Finding hepatitis B early can reduce complications.  Screening is recommended for persons with selected risk factors. never No: is not indicated today.   Hepatitis C screening Finding hepatitis C early can reduce complications.  Screening is recommended for all persons born from 1945 through 1965 and for those with selected other risk factors.  2018- negative No;  Is up to date   HIV screening Finding HIV early can reduce complications.  Screening is recommended for persons with risk factors for HIV infection. 2018- negative No; is up to date.   Glaucoma screening Early detection of glaucoma can prevent blindness.   Please talk to your eye doctor about this.       SCREENING TESTS     Description   Year of Last Screening   Recommended Today?   Colorectal cancer screening    Fecal occult blood test     Screening colonoscopy Screening for colon cancer has been shown to reduce death from colon cancer by 25-30%. Screening recommended to start at 50 years and continuing until age 75 years.   4/2019 Fit test negative No; is up to date.   Breast Cancer Screening (women)    Mammogram Mammogram screening for breast cancer has been shown to reduce the risk of dying from breast cancer and prolong life. Screening is recommended every 1-2 years for women aged 50 to 74 years.  NA No: is not indicated today.   Cervical Cancer screening (women)    Pap Cervical pap smears can reduce cervical cancer. Screening is recommended annually if high risk (history of abnormal pap smears)  otherwise every 2-3 years, stop screening at 65 years of age if history of normal paps. NA No: is not indicated today.   Screening for Osteoporosis:  Bone mass measurements (women)    Dexa Scan Screening and treating Osteoporosis can reduce the risk of hip and spine fractures. Screening is recommended in women 65 years or older and in women and men at risk of osteoporosis. NA No: is not indicated today.   Screening for Lung Cancer     Low-dose CT scanning Screening can reduce mortality in persons aged 55-80 who have smoked at least 30 pack-years and who are either still smoking or have quit in the past 15 years. NA No: is not indicated today.   Abdominal Aortic Aneurysm (AAA) screening    Ultrasound (US)   An aneurysm treated before rupture is very safe -a ruptured aneurysm can be fatal.  Screening  by US for AAA is limited to patients who meet one of the following criteria:    Men who are 65-75 years old and have smoked more than 100 cigarettes in their lifetime    Anyone with a family history of abdominal aortic aneurysm None Recommeded and patient declined.              MEDICARE WELLNESS EXAM PATIENT INSTRUCTIONS    Yearly exam:     See your health care provider every year in order to review changes in your health, review medicines that you take, and discuss preventive care needs such as immunizations and cancer screening.    Get a flu shot each year.     Advance Directives:    If you have not done so, you are encouraged to complete advance directives and/or a living will.   More information about advance directives can be found at: http://www.[place]med.org/advocacy/Key-Issues/Advance-Directives    Nutrition:     Eat at least 5 servings of fruits and vegetables each day.     Eat whole-grain bread, whole-wheat pasta and brown rice instead of white grains and rice.     Talk to your doctor about Calcium and Vitamin D.     Lifestyle:    Exercise for at least 150 minutes a week (30 minutes a day, 5 days a week). This will  help you control your weight and prevent disease.     Limit alcohol to one drink per day.     If you smoke, try to quit - your doctor will be happy to help.     Wear sunscreen to prevent skin cancer.     See your dentist every six months for an exam and cleaning.     See your eye doctor every 1 to 2 years to screen for conditions such as glaucoma, macular degeneration and cataracts.

## 2019-05-14 NOTE — PROGRESS NOTES
65+ Annual Wellness Visit         HPI     This 68 year old male presents as an established patient  Cintia Ortiz who presents for a Initial Medicare Wellness Visit    Other issues patient wants to be addressed today:    Chief Complaint   Patient presents with     Wellness Visit     Pt is here for his 65+ Wellness Visit today.     Medication Reconciliation     Complete.      Patient wants to discuss shoulder pain, but will schedule a follow up visit to do this.      Patient Active Problem List   Diagnosis     Anxiety state     Constipation     Depressive disorder, not elsewhere classified     Esophageal reflux     Major depressive disorder, recurrent episode (H)     Health Care Home     Gout     Rosacea       Past Medical History:   Diagnosis Date     Depressive disorder         Family History   Problem Relation Age of Onset     Diabetes No family hx of      Coronary Artery Disease No family hx of      Breast Cancer No family hx of      Colon Cancer No family hx of      Prostate Cancer No family hx of      Other Cancer No family hx of      Cancer No family hx of      Heart Disease No family hx of          Problem List, Family History and past Medical History reviewed and unchanged/updated.  Nursing Notes:   Lesly Garcia CMA  5/14/2019  8:25 AM  Signed  Due to patient being non-English speaking/uses sign language, an  was used for this visit. Only for face-to-face interpretation by an external agency, date and length of interpretation can be found on the scanned worksheet.       name: Bebo Sorenson  Language: Rufina  Agency:  [employer]  Telephone number: [phone]  Type of interpretation:  Face-to-face, spoken      Lesly Garcia [facility]  5/14/2019  8:25 AM  Signed  Medicare Wellness Visit  Health Risk Assessment        Visual Acuity:  Right Eye: 10/12.5   Left Eye: 10/10  Both Eyes: 10/8      No flowsheet data found.         Health Risk Assessment / Review of Systems      Constitutional: Any fevers or night sweats? No     Eyes:  Vision problems   No     Hearing Do you feel you have hearing loss?  No     Cardiovascular: Any chest pain, fast or irregular heart beat, calf pain with walking?     No           Respiratory:   Any breathing problems or cough?   No     Gastrointestinal: Any stomach or stool problems?   No      Genitourinary: Do you have difficulty controlling urination?   No     Muscles and Joints: Any joint stiffness or soreness?    YES     Skin: Any concerning lesions or moles?   No     Nervous System: Any loss of strength or feeling, numbness or tingling, shaking, dizziness, or headache?  No     Mental Health: Any depression, anxiety or problems sleeping?     YES     Cognition: Do you have any problems with your memory?  No     PHQ-2 Score:   PHQ-2 ( 1999 Pfizer) 1/22/2018 12/27/2017   Q1: Little interest or pleasure in doing things 1 0   Q2: Feeling down, depressed or hopeless 1 0   PHQ-2 Score 2 0       PHQ-9 Score:   [facility] Follow-up to PHQ 2/23/2018 4/17/2018 12/12/2018   PHQ-9 9. Suicide Ideation past 2 weeks Not at all Not at all Not at all            Medical Care     What other specialists or organizations are involved in your medical care?    Patient Care Team       Relationship Specialty Notifications Start End    Cintia Ortiz MD PCP - General   1/8/13     Phone: [phone] Fax: [phone]         [address] [place] [address]    Crystal Freeman, RN Registered Nurse   12/12/18     [facility] Community Care Coordinator [phone]                 Social History / Home Safety     Social History     Tobacco Use     Smoking status: Never Smoker     Smokeless tobacco: Never Used   Substance Use Topics     Alcohol use: No     Marital Status:  Who lives in your household? Self, Son, Daughter-in-law, 4 grandchildren.    Does your home have any of the following safety concerns? Loose rugs in the hallway, no grab bars in the bathroom, no handrails on the  "stairs or have poorly lit areas?   YES No grab bars and no handrails on stairs.     Do you feel threatened or controlled by a partner, ex-partner or anyone in your life? No     Has anyone hurt you physically, for example by pushing, hitting, slapping or kicking you   or forcing you to have sex? No          Functional Status     Do you need help with dressing yourself, bathing, or walking?No     Do you need help with the phone, transportation, shopping, preparing meals, housework, laundry, medications or managing money?No       Risk Behaviors and Healthy Habits     History   Smoking Status     Never Smoker   Smokeless Tobacco     Never Used     How many servings of fruits and vegetables do you eat a day? 1    Exercise: 2-3 days/week for an average of 15-30 minutes walking      Do you frequently drive without a seatbelt? No     Do you use any other drugs? No         Do you use alcohol?No      Frailty Assessment            1. By yourself and note using aids, do you have difficulty walking up 10 steps without resting?  No  (1 for Yes, 0 for No)    2. By yourself and not using mobility aids, do you have any difficulty walking several hundred yards? No  (1 for Yes, 0 for No)    3. Have you lost 10 or more pounds unintentionally in the previous year? No  (If \"Yes\" and >5% weight loss, then score 1.  Score 0, if <5% weight loss or \"No\" weight loss)    4. How much of the times during the past 3 weeks did you feel tired? 5. None of the time (\"1\" or \"2\" are scored 1, others 0)    5.  A doctor told the patient they had the following illnesses:  None    Lesly Garcia [facility]          Frailty Assessment            1. 0    2. 0    3. 0    4. 0    5. 0    Total score: 0    Frailty screen score:  0      EVALUATION OF COGNITIVE FUNCTION     Mood/affect:Normal  Appearance:Normal  Family member/caregiver input: Normal    Animal Naming test:  15, normal.        Other Assessments     CV Risk based on Pooled Cohort Risk (consider " "assessing every 4-6 years; consider statin in patients with 10-yr risk > 7.5%):   The 10-year ASCVD risk score (Van Alstynemichelle PARR Jr., et al., 2013) is: 14%    Values used to calculate the score:      Age: 68 years      Sex: Male      Is Non- : No      Diabetic: No      Tobacco smoker: No      Systolic Blood Pressure: 130 mmHg      Is BP treated: No      HDL Cholesterol: 44.8 mg/dL      Total Cholesterol: 161.3 mg/dL    Advance Directives: Discussed with patient and family as appropriate.  Has patient completed advance directives and/or a living will?  no   Patient does not feel he needs this right now and is not interested in discussing it.        Immunization History   Administered Date(s) Administered     Influenza (IIV3) PF 02/12/2007, 09/29/2010, 10/24/2013, 12/01/2015, 12/18/2017     Pneumo Conj 13-V (2010&after) 04/27/2018     Pneumococcal 23 valent 05/14/2019     TD (ADULT, 7+) 11/16/1998     Tdap (Adacel,Boostrix) 09/29/2010     Reviewed Immunization Record Today         Physical Exam     Vitals: /83 (BP Location: Left arm, Patient Position: Sitting, Cuff Size: Adult Regular)   Pulse 75   Temp 98.2  F (36.8  C) (Oral)   Resp 16   Ht 1.58 m (5' 2.21\")   Wt 67.1 kg (148 lb)   SpO2 98%   BMI 26.89 kg/m    BMI= Body mass index is 26.89 kg/m .  EXAM:  Objective:    Vitals:  Vitals are reviewed and are within the normal range  Gen:  Alert, pleasant, no acute distress  Cardiac:  Regular rate and rhythm, no murmurs, rubs or gallops  Respiratory:  Lungs clear to auscultation bilaterally  Abdomen:  Soft, non-tender, non-distended, bowel sounds positive  Extremities: Patient with hyperpigmented, thickened skin on his hands, consistent with dyshydrotic eczema.          Assessment and Plan       Reviewed Preventive Services and Plan form with patient as specified in Patient Instructions.    Positive findings on assessment: Patient presenting for Medicare wellness visit.  He is due for lipid " panel, metabolic panel, and uric acid.  These were ordered today.  Pneumococcal vaccine given.  We discussed AAA screening, which she is also due for, but he would like to delay this until he can talk with his family.  Has completed FIT testing already.  Shares that he does have a family history of colon cancer.    He does continue to have pain in his right shoulder, and will schedule follow-up visit to discuss this more fully.    He is having itching on his hands, especially with the change in weather.  He thinks that some is from the sun as well as some from his increased work in the yard and garden.  Exam consistent with dyshidrotic eczema.  Will treat with antihistamine ointment and triamcinolone cream during the day.     Cristina was seen today for wellness visit and medication reconciliation.    Diagnoses and all orders for this visit:    Wellness examination  -     ADMIN VACCINE, INITIAL    Dyshidrotic eczema  -     triamcinolone (KENALOG) 0.1 % external cream; Apply topically 3 times daily  -     cetirizine (ZYRTEC) 10 MG tablet; Take 1 tablet (10 mg) by mouth At Bedtime    Preventative health care  -     Lipid Panel ([place])  -     Basic Metabolic Panel ([place])    Idiopathic chronic gout of foot without tophus, unspecified laterality  -     Uric Acid ([facility])    Other orders  -     Pneumococcal vaccine 23 valent PPSV23  (Pneumovax) [23969]        Options for treatment and follow-up care were reviewed with the Cristina Wa Rafal and/or guardian engaged in the decision making process and verbalized understanding of the options discussed and agreed with the final plan.    Cintia Ortiz MD

## 2019-05-16 NOTE — RESULT ENCOUNTER NOTE
Cristina Lyles-    Here is a copy of your lab results.  Your uric acid (gout levels) are excellent.  This is good news!  Your kidney tests are also good.  Your sodium is a little high--usually this means you should be drinking more water.  Your cholesterol is excellent.  Please call the clinic at [phone] if you have any questions.      Cintia Ortiz    Please send results to patient.

## 2020-04-24 ENCOUNTER — DOCUMENTATION ONLY (OUTPATIENT)
Dept: FAMILY MEDICINE | Facility: CLINIC | Age: 70
End: 2020-04-24

## 2020-04-24 NOTE — PROGRESS NOTES
Telephone call to the client's home for annual health risk assessment.  An  was present through [employer].    Current situation/living environment  Lives with family    Activities of daily living (ADL)/instrumental activities of daily living (IADL) and functional issues  Client needs help with the following ADL's: none  Client needs help with the following IADL's: none  Client states he is unable to perform the above due to n/a.  Remains independent and active in the community      Health concerns for today  none  Has patient fallen 2 or more times in the last year? No  Has patient fallen with injury in the last year? No    Cognition/mental health  No Concerns    STARS/Med Adherence  Client is non-compliant with the following quality measures: Colon cancer screening  Comments: education efforts    Client's Plan of Care consists of:  No formal services.

## 2020-10-21 ENCOUNTER — OFFICE VISIT (OUTPATIENT)
Dept: FAMILY MEDICINE | Facility: CLINIC | Age: 70
End: 2020-10-21
Payer: COMMERCIAL

## 2020-10-21 VITALS
DIASTOLIC BLOOD PRESSURE: 76 MMHG | RESPIRATION RATE: 16 BRPM | BODY MASS INDEX: 26.16 KG/M2 | SYSTOLIC BLOOD PRESSURE: 137 MMHG | HEART RATE: 85 BPM | TEMPERATURE: 97.9 F | OXYGEN SATURATION: 99 % | WEIGHT: 144 LBS

## 2020-10-21 DIAGNOSIS — L57.0 ACTINIC KERATOSIS: Primary | ICD-10-CM

## 2020-10-21 DIAGNOSIS — N18.30 STAGE 3 CHRONIC KIDNEY DISEASE, UNSPECIFIED WHETHER STAGE 3A OR 3B CKD (H): ICD-10-CM

## 2020-10-21 DIAGNOSIS — G89.29 CHRONIC RIGHT SHOULDER PAIN: ICD-10-CM

## 2020-10-21 DIAGNOSIS — L71.9 ACNE ROSACEA: ICD-10-CM

## 2020-10-21 DIAGNOSIS — M1A.0710 CHRONIC GOUT OF RIGHT FOOT, UNSPECIFIED CAUSE: Primary | ICD-10-CM

## 2020-10-21 DIAGNOSIS — M25.511 CHRONIC RIGHT SHOULDER PAIN: ICD-10-CM

## 2020-10-21 DIAGNOSIS — M1A.0710 CHRONIC GOUT OF RIGHT FOOT, UNSPECIFIED CAUSE: ICD-10-CM

## 2020-10-21 DIAGNOSIS — J30.2 SEASONAL ALLERGIC RHINITIS, UNSPECIFIED TRIGGER: ICD-10-CM

## 2020-10-21 DIAGNOSIS — L29.9 ITCHING: ICD-10-CM

## 2020-10-21 LAB
BUN SERPL-MCNC: 23.7 MG/DL (ref 7–21)
CALCIUM SERPL-MCNC: 9.3 MG/DL (ref 8.5–10.1)
CHLORIDE SERPLBLD-SCNC: 101.9 MMOL/L (ref 98–110)
CO2 SERPL-SCNC: 25.6 MMOL/L (ref 20–32)
CREAT SERPL-MCNC: 1.1 MG/DL (ref 0.7–1.3)
GFR SERPL CREATININE-BSD FRML MDRD: 68.7 ML/MIN/1.7 M2
GLUCOSE SERPL-MCNC: 114.6 MG'DL (ref 70–99)
POTASSIUM SERPL-SCNC: 3.9 MMOL/L (ref 3.2–4.6)
SODIUM SERPL-SCNC: 138.7 MMOL/L (ref 132–142)
URATE SERPL-MCNC: 7.8 MG/DL (ref 3–8)

## 2020-10-21 PROCEDURE — 99214 OFFICE O/P EST MOD 30 MIN: CPT | Performed by: STUDENT IN AN ORGANIZED HEALTH CARE EDUCATION/TRAINING PROGRAM

## 2020-10-21 PROCEDURE — 80048 BASIC METABOLIC PNL TOTAL CA: CPT | Performed by: STUDENT IN AN ORGANIZED HEALTH CARE EDUCATION/TRAINING PROGRAM

## 2020-10-21 RX ORDER — COLCHICINE 0.6 MG/1
0.6 TABLET ORAL DAILY
Qty: 90 TABLET | Refills: 3 | Status: SHIPPED | OUTPATIENT
Start: 2020-10-21 | End: 2021-10-27

## 2020-10-21 RX ORDER — TRIAMCINOLONE ACETONIDE 1 MG/G
OINTMENT TOPICAL 2 TIMES DAILY
Qty: 15 G | Refills: 1 | Status: SHIPPED | OUTPATIENT
Start: 2020-10-21 | End: 2022-01-19

## 2020-10-21 RX ORDER — CETIRIZINE HYDROCHLORIDE 10 MG/1
10 TABLET ORAL DAILY
Qty: 30 TABLET | Refills: 1 | Status: SHIPPED | OUTPATIENT
Start: 2020-10-21 | End: 2021-02-16

## 2020-10-21 RX ORDER — ALLOPURINOL 300 MG/1
300 TABLET ORAL DAILY
Qty: 90 TABLET | Refills: 3 | Status: SHIPPED | OUTPATIENT
Start: 2020-10-21 | End: 2021-10-27

## 2020-10-21 NOTE — NURSING NOTE
Due to patient being non-English speaking/uses sign language, an  was used for this visit. Only for face-to-face interpretation by an external agency, date and length of interpretation can be found on the scanned worksheet.     name: Marimar Molina  Agency: [employer]  Language: Rufina   Telephone number: [phone]  Type of interpretation: Telephone, spoken

## 2020-10-21 NOTE — LETTER
October 22, 2020      Cristina Lyles  [address]  [place] [place] [address]        Dear ,    We are writing to inform you of your test results.    Here is a copy of your lab results.  They all look good.  We will review them together when you come back for the mole removal.  Please call the clinic at [phone] if you have any questions.       Resulted Orders   Uric Acid ([facility])   Result Value Ref Range    Uric Acid 7.8 3.0 - 8.0 mg/dL    Narrative    Test performed by:  [facility]  [address] [place] [address]   Basic Metabolic Panel ([place])   Result Value Ref Range    Urea Nitrogen 23.7 (H) 7.0 - 21.0 mg/dL    Calcium 9.3 8.5 - 10.1 mg/dL    Chloride 101.9 98.0 - 110.0 mmol/L    Carbon Dioxide 25.6 20.0 - 32.0 mmol/L    Creatinine 1.1 0.7 - 1.3 mg/dL    Glucose 114.6 (H) 70.0 - 99.0 mg'dL    Potassium 3.9 3.2 - 4.6 mmol/L    Sodium 138.7 132.0 - 142.0 mmol/L    GFR Estimate 68.7 >60.0 mL/min/1.7 m2    GFR Estimate If Black 83.1 >60.0 mL/min/1.7 m2       If you have any questions or concerns, please call the clinic at the number listed above.       Sincerely,        Cintia Ortiz MD

## 2020-10-21 NOTE — PATIENT INSTRUCTIONS
Schedule 40 min appointment here for mole removal.      Pill for itching at night:  Ceterizine.  1 pill per day at night.      New prescription for ointment.      Refill gout medicines.    Refill Naproxen for severe gout pain.

## 2020-10-21 NOTE — PROGRESS NOTES
Behavioral Health Documentation Tools: [facility]     Assessment Tools:  BHDOCADHDVANDERBILTSUMMARY  BHDOCASTHMAASSESSMENT  BHDOCAUDIT  BHDOCCAGESCREEN  BHDOCCVDASSESSMENT  BHDOCCVTSCREEN  BHDOCCOPDASSESSMENT  BHDOCDAST  BHDOCDIRE  BHDOCEATINGDISORDERSCREEN  DOCFAQ - Functional Ability Questionnaire  BHDOCGERIATRICDEPRESSIONSCALE  BHDOCHARKSCREEN  BHDOCHEALTHANXIETYASSESSMENT  BHDOCHOTFLASHESASSESSMENT  BHDOCIBSASSESSMENT  DOCINSOMNIAQUESTIONNAIRE - Insomnia Severity Index   BHDOCLSR  BHDOCLIFESTYLESCREENER  BHDOCMOCA  BHDOCOPIOIDOVERDOSECALCULATOR  BHDOCPTSDSCREEN  DOCSLEEPNEEDQUESTIONNAIRE - Sleep need questionnaire  DOCWHODAS     Individual Note Templates:  [id number] - pt not seen  [id number] - for complex referrals  BHDOCBARIATRIC - [place] only  BHDOCCHARTREVIEW  BHDOCCONSULT - integrated care consultation  BHDOCCPMCONSULT  BHDOCCPMCONSULTFOLLOWUP  BHDOCDIABETESGRP - [place] only  BHDOCDIAGNOSTIC - diagnostic asessment  DOCDIAGNOSTICUPDATE - diagnostic assessment update (annual)  BHDOCLIFESTYLEINTAKE  BHDOCLIFESTYLEPEDIATRICINTAKE  BHDOCLIFESTYLEFOLLOWUPBETHESDA - with legal/screen prompt  BHDOCLIFESTYLEFOLLOWUP  BHDOCLIFESTYLEINTAKEINSOMNIA - insomnia intake  BHDOCLIFESTYLEFOLLOWUPINSOMNIA - insomnia follow-up note  BHDOCPROGRESSNOTEBETHESDA - psychotherapy notes w/legal screen prompt  BHDOCPROGRESSNOTE - psychotherapy notes  BHDOCSUBOXONEASSESSMENT - [place]  BHDOCTREATMENTPLAN  BHDOCWETSESSION1  BHDOCWETSESSION2  BHDOCWETSESSION3  BHDOCWETSESSION4  BHDOCWETSESSION5  [id number] - [place] only  [id number] - [place] only  [id number] - [facility] only  [id number] - [place] only  [id number] - [facility] only  [id number]-for use in visits to determine potential eligibility for [facility] waiver     Lifestyle Group Note  Templates:  BHDOCLIFESTYLEGROUP1  BHDOCLIFESTYLEGROUPFRUITSANDVEGETABLES  BHDOCLIFESTYLEGROUPBMRSPECIALOCCASIONS  BHDOCLIFESTYLEGROUPHEALTHYFATS  BHDOCLIFESTYLEGROUPNUTRITIONLABELS  BHDOCLIFESTYLEGROUPSWEETS  BHDOCLIFESTYLEGROUPWHOLEGRAINS  BHDOCLIFESTYLEGROUPEMOTIONALEATING  BHDOCLIFESTYLEGROUPMEATSANDPROTEINS  BHDOCLIFESTYLEGROUPSTRESSMANAGEMENT  BHDOCLIFESTYLEGROUPECONOMICFACTORS     [facility] Note Templates:  BHDOCKARENGRP1  BHDOCKARENGRP2  BHDOCKARENGRP3  BHDOCKARENGRP4  BHDOCKARENGRP5  BHDOCKARENGRP6  BHDOCKARENGRP7  BHDOCKARENGRP8  BHDOCKARENGRP9  [id number]  [id number]  [id number]     Phrases:  BHDOCBIPOLARSUPPORTGROUPS - List of support groups for bipolar disorder  [facility] - S template with number for [facility]  BHDOCCOMPLEXITY - for use of additional billing code  DOCDISCHARGE - Discharge Summary  DOCSSSUPPORT - Social Security Support statement  DOCSUICIDEASSESS - Risk Assessment  DOCTPUPDATE - Tx plan update statement  DOCWARMHANDOFF - Warm hand off/discussion of psychotherapy services    Behavioral Health Letters - BHLETTER    BHLETTERADHDSCHOOLBETHESDA  BHLETTERBLUEPLUSTRANSPORT - [employer] Transportation exemption form  BHLETTERCPMREFERTHRIVE  BHLETTERHOSPITALFOLLOWUPNOSHOW  BHLETTERLIFESTYLEINTAKENOSHOW  BHLETTERLIFESTYLEREFERRAL  BHLETTERNOSHOW  BHLETTERREQUESTRECORDS  BHLETTERSCHOOLEVAL    Behavioral Health Phone Notes - BHPHONE    BHPHONEDEPRESSIONQI  BHPHONEDEPRESSIONUNABLETOREACH  BHPHONENOSHOW    Diagnostic Support tools:  BHDX    BHDXBORDERLINEVSBIPOLARHELP  [id number]  BHDXPTSD - review of DSM5 criteria for PTSD  [id number] - criteria for borderline personality dissorder    Patient instructions/education: BHPT    BHPTAPPS - Umer suggestions for patients  BHPTBETHESDACLINICPICTURE - Picture of [facility] with phone number imposed on image  BHPTSNAPAPPLICATION- Instructions on how to apply for SNAP in [place]   BHPTVIDEORECS - Videos about mental  health and addiction to recommend to patients and providers (pick and choose which are applicable)  BHPTTEMPORARYPETCARE -Ideas and resources for patients in need of temporary pet care in the face of hospitalization/residential treatment.       ACT Tools:  BHPTACTATTEMPTEDSOLUTIONSANDLONGTERMEFFECTS  BHPTACTBULLSEYE  BHPTACTDISSECTINGTHEPROBLEM  BHPTACTGETTINGHOOKED  BHPTACTINFORMALMINDFULNESSPRACTICE  BHPTACTLIFECOMPASS  BHPTACTMINDFULBREATHINGPRACTICESHEET  BHPTACTOVERCOMINGFEARPART1  BHPTACTOVERCOMINGFEARPART2  BHPTACTPROBLEMSANDVALUESWORKSHEET  BHPTACTSIMPLEWAYSTOGETPRESENT  BHPTACTSTRUGGLINGVSOPENINGUP  BHPTACTVALUESEXPLORATION  BHPTACTVITALITYVSSUFFERINGDIARY  BHPTACTWILLINGNESSANDACTIONPLAN  BHPTTRAUMAINFORMEDEXAMAGREEMENT     Alcohol:  See Substance Use Section     Anxiety Tools:  BHPTBOXBREATHING  BHPTDEEPBREATHING  BHPTEXPOSUREHIERARCHY  BHPTHEALTHANXIETY  BHPTINVIVOEXPOSUREHOMEWORK  BHPTPANICDISORDERHANDOUT  BHPTRECOGNIZINGANXIETY  BHPTSTOPSKILL  BHPTWORRYMANAGEMENT     Asthma Tools:  BHPTASTHMADIARY  BHPTASTHMAWORSENINGFACTORS     Blood Pressure Tools:  BHPTHIGHBLOODPRESSURE  BHPTBLOODPRESSUREDIETARYCHANGES     Bullying Resources:  BHPTBULLYAPP  BHPTBULLYPROBLEMSOLVING  BHPTBULLYTIPSHEET     Care Plans:  BHPTCPMPERSONALCAREPLAN  BHPTRELAPSEPREVENTIONPLAN     Cognitive Tools:  [id number]  BHPTCOMMONCOGNITIVEERRORS  BHPTPROBLEMSOLVINGWORKSHEET  BHPTSTOPSKILL  BHPTTHOUGHTRECORD  BHPTUNHELPFULTHINKINGSTYLES  BHPTQUESTIONINGCOGNITIVEERRORS     Communication Tools:  BHPTASSERTIVECOMMUNICATION  BHPTRESPONDVSREACT  BHPTDBTPROBLEMSOLVING     COPD Tools:  BHPTCOPDPURSEDLIPBREATHING  BHPTSHORTNESSOFBREATHCYCLE     Crisis Resources:  BHPTSMIAVS - instructions for [facility] AVS  BHPTSTPAULCRISISRESOURCES  BHPTSTPAULCHILDRENSCRISISRESOURCES  BHPTSMILEYSCRISISNUMBERS  BHPTCRISISSAFETYPLAN     Depression  Tools:  BHPTRECOGNIZINGDEPRESSION  BHPTBEHAVIORALACTIVATION  BHPTDEPRESSIONACTIONPLAN     Diabetes:  BHPTDIABETESSELFMONITOR  BHPTDIABETICDIET  BHPTDIABETICFRIENDLYSNACK  BHPTDIABETESAPP     Grief/Loss:  BHPTGRIEFEDUCATION     Health Behavior Change Tools:  [id number]  BHPTDIABETICDIET  BHPTDIABETICFRIENDLYSNACKS  BHPTDIABETESSELFMONITORING  BHPTFOODDIARY  BHPTFOODMODIFICATIONS  BHPTINCREASINGPHYSICALACTIVITY  BHPTREADINESSTOCHANGE  BHPTSMOKINGCESSATIONDEADS  BHPTSMOKINGWORKSHEET  BHPTTOBACCOCESSATION     Irritable Bowel Syndrome:  BHPTIBSEDUCATION  BHPTIBSSYMPTOMMONITORING  BHPTIBSAPP    [facility] Contact for [facility] Patients:  BHPTKOMCONTACT     Meditation Practices  [id number] - [id number] Meditating/grounding exercise  BHPTMEDITATIONDIFFICULTEMOTIONS     Pain  BHPTGOUT  BHPTPAINCBTCOPINGSTATEMENTS  BHPTPAINCBTMODEL  BHPTPAINCBTPACING  BHPTPAINCBTPAINCYCLE  BHPTPAINCHRONICPAINWHEEL  BHPTPAINCOMMONPAINBELIEFS  BHPTPAINHEADACHELOG  BHPTPAINMANAGINGINTENSEPAINEPISODES  BHPTPAINPAINDIARY  BHPTPAINUNDERSTANDINGCHRONICPAIN     Parenting   BHPTPARENTACTIVEIGNORING  BHPTPARENTGOODDIRECTIONS  BHPTPARENTPRAISE  BHPTPARENTREWARDS  BHPTPARENTSPECIALTIME  BHPTPARENTTIMEOUT     Sleep Hygiene:  BHPTSLEEPHYGIENE  BHPTSLEEPRESTRICTION     Smart Goals:  BHPTSMARTGOALS     Sexual Health:  BHPTERECTILEDYSFUNCTION  BHPTPREMATUREEJACULATION  BHPTSEXUALPROBLEMSANDSELFHELP     Stress Management Tools:  BHPTGRATITUDEPRACTICE  [id number]  BHPTDEEPBREATHING  BHPTPROGRESSIVEMUSCLERELAXATION  BHPTSTRESSRESPONSE     Substance Use Disorders:

## 2020-10-21 NOTE — LETTER
October 22, 2020      Cristina Lyles  [address]  [facility] [address]        Dear ,    We are writing to inform you of your test results.    Here is a copy of your lab results.  They all look good.  We will review them together when you come back for the mole removal.  Please call the clinic at [phone] if you have any questions.       Resulted Orders   Uric Acid ([facility])   Result Value Ref Range    Uric Acid 7.8 3.0 - 8.0 mg/dL    Narrative    Test performed by:  [facility]  [address] [place] [address]   Basic Metabolic Panel ([facility])   Result Value Ref Range    Urea Nitrogen 23.7 (H) 7.0 - 21.0 mg/dL    Calcium 9.3 8.5 - 10.1 mg/dL    Chloride 101.9 98.0 - 110.0 mmol/L    Carbon Dioxide 25.6 20.0 - 32.0 mmol/L    Creatinine 1.1 0.7 - 1.3 mg/dL    Glucose 114.6 (H) 70.0 - 99.0 mg'dL    Potassium 3.9 3.2 - 4.6 mmol/L    Sodium 138.7 132.0 - 142.0 mmol/L    GFR Estimate 68.7 >60.0 mL/min/1.7 m2    GFR Estimate If Black 83.1 >60.0 mL/min/1.7 m2       If you have any questions or concerns, please call the clinic at the number listed above.       Sincerely,        Cintia Ortiz MD

## 2020-10-22 NOTE — RESULT ENCOUNTER NOTE
Cristina Lyles-    Here is a copy of your lab results.  They all look good.  We will review them together when you come back for the mole removal.  Please call the clinic at [phone] if you have any questions.      Cintia Ortiz MD    Please send results to patient.

## 2020-10-22 NOTE — PROGRESS NOTES
Nursing Notes:   Lesly Garcia, [facility]  10/21/2020  2:15 PM  Signed  Due to patient being non-English speaking/uses sign language, an  was used for this visit. Only for face-to-face interpretation by an external agency, date and length of interpretation can be found on the scanned worksheet.     name: Marimar Molina  Agency: [employer]  Language: hannah   Telephone number: [phone]  Type of interpretation: Telephone, spoken          SUBJECTIVE  Cristina Amina Lyles is a 69 year old male with past medical history significant for    Patient Active Problem List   Diagnosis     Anxiety state     Constipation     Depressive disorder, not elsewhere classified     Esophageal reflux     Major depressive disorder, recurrent episode (H)     Health Care Home     Gout     Rosacea     Others present at the visit:   Marimar Molina via telephone    Presents for   Chief Complaint   Patient presents with     Referral     Pt would like to discuss why one of the moles on his face is changing.     Medication Reconciliation     Complete.      This is a 37-year-old male presenting for evaluation of skin lesion.  Has multiple areas of small hypopigmentation on his face, but there is one located on the left jawline that has over the last month gotten bigger.  He notices that it is itchy, gets stuck on things, and that it is become larger and change colors.  Notes that it is easily irritated, and sometimes bleeds.  He is worried that this could be cancer.  He would like it taken off and to make sure that it is not a cancer.  We discussed options including seeing a dermatologist for removal, versus having it done here, and he is very nervous to go to another clinic during Covid and would like me to remove it here at the clinic.    He is also requesting refills on his gout medications.  Reports taking 2 pills daily for gout, 2 different types of pills, one a small pink 1 and 1 a larger white 1.  I believe this is the  allopurinol and the colchicine.  Gout symptoms have been good.  He stopped in lab for uric acid testing.    He is also noticed increased itching in his abdomen, legs, and calf region.  It bothers him especially at night.  Notes that sometimes he scratches the areas and then they will become thickened and stiff.  Is wondering if I have a medication that would help with this.    He has been using a cream, triamcinolone, that he brought with today on his nose for symptoms of rosacea.  The new cream is white and the old cream was clear, and he feels like the old cream worked better.  He would like to go back on that medication.    OBJECTIVE:  Vitals: /76 (BP Location: Left arm, Patient Position: Sitting, Cuff Size: Adult Regular)   Pulse 85   Temp 97.9  F (36.6  C) (Oral)   Resp 16   Wt 65.3 kg (144 lb)   SpO2 99%   BMI 26.16 kg/m    BMI= Body mass index is 26.16 kg/m .  Objective:    Vitals:  Vitals are reviewed and are within the normal range  Gen:  Alert, pleasant, no acute distress  HEENT: He has multiple freckles across his face.  A couple scattered seborrheic keratoses, including one on the right lower face.  On the left jaw area, he has got a eraser sized elevated dark lesion.  It looks scabbed over and irritated.  This is concerning for some type of malignancy, or severe actinic keratosis.  Cardiac:  Regular rate and rhythm, no murmurs, rubs or gallops  Respiratory:  Lungs clear to auscultation bilaterally  Abdomen:  Soft, non-tender, non-distended, bowel sounds positive.  No evidence of rash, excoriation or irritation on the abdomen.  Extremities:  Warm, well-perfused, pulses 2+/4, no lower extremity edema.  No evidence of joint swelling, tenderness, or gout worsening on lower extremities.  Mild patches of excoriation on the legs and feet consistent with scratching.    Results for orders placed or performed in visit on 10/21/20   Basic Metabolic Panel ([facility])     Status: Abnormal   Result Value  Ref Range    Urea Nitrogen 23.7 (H) 7.0 - 21.0 mg/dL    Calcium 9.3 8.5 - 10.1 mg/dL    Chloride 101.9 98.0 - 110.0 mmol/L    Carbon Dioxide 25.6 20.0 - 32.0 mmol/L    Creatinine 1.1 0.7 - 1.3 mg/dL    Glucose 114.6 (H) 70.0 - 99.0 mg'dL    Potassium 3.9 3.2 - 4.6 mmol/L    Sodium 138.7 132.0 - 142.0 mmol/L    GFR Estimate 68.7 >60.0 mL/min/1.7 m2    GFR Estimate If Black 83.1 >60.0 mL/min/1.7 m2     Renal function is stable.  Uric acid level still pending.    ASSESSMENT AND PLAN:      Cristina was seen today for referral and medication reconciliation.    Diagnoses and all orders for this visit:    Actinic keratosis.  Facial lesion on left jaw, concerning for possible malignancy and irritated actinic keratosis at baseline.  Discussed options, and recommended that he see a dermatologist for removal given its location on the face.  He would like this done here, as he trust the clinic.  We will have him return for a 40-minute procedural visit to do an elliptical excision of the lesion.    Chronic gout of right foot, unspecified cause.  Gout symptoms are currently stable.  Refilled his regular medications which include allopurinol and colchicine.  Naproxen for exacerbations.    -     Uric Acid ([facility])  -     Basic Metabolic Panel ([facility])  -     allopurinol (ZYLOPRIM) 300 MG tablet; Take 1 tablet (300 mg) by mouth daily  -     colchicine (COLCYRS) 0.6 MG tablet; Take 1 tablet (0.6 mg) by mouth daily  -     naproxen (NAPROSYN) 375 MG tablet; Take 1 tablet (375 mg) by mouth 2 times daily as needed for moderate pain    Acne rosacea switch his triamcinolone from cream to ointment.  -     triamcinolone (KENALOG) 0.1 % external ointment; Apply topically 2 times daily    Itching  Seasonal allergic rhinitis, unspecified trigger.  Will use oral antihistamine for itching.  -     cetirizine (ZYRTEC) 10 MG tablet; Take 1 tablet (10 mg) by mouth daily      Patient Instructions   Schedule 40 min appointment here for mole removal.       Pill for itching at night:  Ceterizine.  1 pill per day at night.      New prescription for ointment.      Refill gout medicines.    Refill Naproxen for severe gout pain.            Cintia Ortiz MD

## 2021-02-12 DIAGNOSIS — L29.9 ITCHING: ICD-10-CM

## 2021-02-12 DIAGNOSIS — J30.2 SEASONAL ALLERGIC RHINITIS, UNSPECIFIED TRIGGER: ICD-10-CM

## 2021-02-16 RX ORDER — CETIRIZINE HYDROCHLORIDE 10 MG/1
TABLET ORAL
Qty: 30 TABLET | Refills: 1 | Status: SHIPPED | OUTPATIENT
Start: 2021-02-16 | End: 2021-03-02

## 2021-02-20 ENCOUNTER — IMMUNIZATION (OUTPATIENT)
Dept: FAMILY MEDICINE | Facility: CLINIC | Age: 71
End: 2021-02-20
Payer: COMMERCIAL

## 2021-02-20 PROCEDURE — 0001A PR COVID VAC PFIZER DIL RECON 30 MCG/0.3 ML IM: CPT

## 2021-02-20 PROCEDURE — 91300 PR COVID VAC PFIZER DIL RECON 30 MCG/0.3 ML IM: CPT

## 2021-02-26 ENCOUNTER — ANCILLARY PROCEDURE (OUTPATIENT)
Dept: GENERAL RADIOLOGY | Facility: CLINIC | Age: 71
End: 2021-02-26
Attending: FAMILY MEDICINE
Payer: COMMERCIAL

## 2021-02-26 ENCOUNTER — OFFICE VISIT (OUTPATIENT)
Dept: FAMILY MEDICINE | Facility: CLINIC | Age: 71
End: 2021-02-26
Payer: COMMERCIAL

## 2021-02-26 VITALS
DIASTOLIC BLOOD PRESSURE: 83 MMHG | WEIGHT: 152 LBS | BODY MASS INDEX: 27.62 KG/M2 | HEART RATE: 111 BPM | OXYGEN SATURATION: 98 % | RESPIRATION RATE: 18 BRPM | TEMPERATURE: 97.3 F | SYSTOLIC BLOOD PRESSURE: 146 MMHG

## 2021-02-26 DIAGNOSIS — Z23 NEED FOR VACCINATION: ICD-10-CM

## 2021-02-26 DIAGNOSIS — M79.671 RIGHT FOOT PAIN: Primary | ICD-10-CM

## 2021-02-26 LAB
HBA1C MFR BLD: 5.7 % (ref 4.1–5.7)
URATE SERPL-MCNC: 8.4 MG/DL (ref 3–8)

## 2021-02-26 PROCEDURE — 99213 OFFICE O/P EST LOW 20 MIN: CPT | Mod: 25 | Performed by: STUDENT IN AN ORGANIZED HEALTH CARE EDUCATION/TRAINING PROGRAM

## 2021-02-26 PROCEDURE — 90471 IMMUNIZATION ADMIN: CPT | Performed by: STUDENT IN AN ORGANIZED HEALTH CARE EDUCATION/TRAINING PROGRAM

## 2021-02-26 PROCEDURE — 83036 HEMOGLOBIN GLYCOSYLATED A1C: CPT | Performed by: STUDENT IN AN ORGANIZED HEALTH CARE EDUCATION/TRAINING PROGRAM

## 2021-02-26 PROCEDURE — 36415 COLL VENOUS BLD VENIPUNCTURE: CPT | Performed by: STUDENT IN AN ORGANIZED HEALTH CARE EDUCATION/TRAINING PROGRAM

## 2021-02-26 PROCEDURE — 90715 TDAP VACCINE 7 YRS/> IM: CPT | Performed by: STUDENT IN AN ORGANIZED HEALTH CARE EDUCATION/TRAINING PROGRAM

## 2021-02-26 PROCEDURE — 73630 X-RAY EXAM OF FOOT: CPT | Mod: RT | Performed by: RADIOLOGY

## 2021-02-26 ASSESSMENT — PATIENT HEALTH QUESTIONNAIRE - PHQ9: SUM OF ALL RESPONSES TO PHQ QUESTIONS 1-9: 1

## 2021-02-26 NOTE — PROGRESS NOTES
Preceptor Attestation:   Patient seen, evaluated and discussed with the resident. I have verified the content of the note, which accurately reflects my assessment of the patient and the plan of care.   Supervising Physician:  Mc Villegas MD

## 2021-02-26 NOTE — NURSING NOTE
Due to patient being non-English speaking/uses sign language, an  was used for this visit. Only for face-to-face interpretation by an external agency, date and length of interpretation can be found on the scanned worksheet.     name:   Agency: [employer] - telephone  Language: Rufina   Telephone number:   Type of interpretation: Telephone, spoken

## 2021-02-26 NOTE — PROGRESS NOTES
"[facility] Visit    Assessment & Plan   1. Right foot pain  No puncture wound on exam, no signs / symptoms of infection. Did update Tdap just in case. Overall, exam unremarkable. Xrays appear negative. No signs of frostbite (and this was two days ago). Low suspicion for gout, but could be beginning of a flare. (Although this doesn't feel like his gout). Recommended some Ibuprofen for pain as needed. We will also check for diabetes and he will follow up in one week to make sure this has resolved.   - XR Foot Right G/E 3 Views  - Hemoglobin A1c ([facility])  - Uric Acid ([facility])    2. Need for vaccination  - ADMIN VACCINE, INITIAL  - TDAP VACCINE (Adacel, Boostrix)  [[id number]]         Options for treatment and follow-up care were reviewed with the patient who was engaged and actively involved in the decision making process, verbalized understanding of the options discussed, and satisfied with the final plan.    Patient was staffed with supervising physician, Dr. Villegas.     Yasmin Ayala MD, PGY2  [facility] Medicine    Subjective   Cristina Lyles is a 70 year old male with a history including constipation, GERD, gout, depression/anxiety, who presents for foot pain.    Foot pain - Two days ago - Patient was walking to garbage can outside 1 yard away with barefoot on cement/concrete. Awhile ago landlord planted cactus type plant on the side of concrete (that has been removed, but concerned wind blew some of the plant on to the concrete). He did not initially feel like he stepped on something, but when he got to his house felt like he could feel something in his foot.  He cannot see the object, but feels a \"needle\" in his foot. Pain has been worsening, he cannot walk due to pain. Now he is starting to have pain at night too. It has not been red or swollen. No fevers. No trouble wiggling his toes. No numbness or tingling in his toes. He is concerned it is needles from the plant or small " glass from the concrete. He has never had pain like this before. It does not feel like his gout. Using a needle to try and find the foreign body (went up and down). It's not that painful so he hasn't taken anything for pain. He is taking his allopurinol and colchicine for chronic gout.     Discussed scheduling follow up to discuss more about his scalp pain and ringing in his ears.     Blood pressure elevated today, but he is also in pain.       Patient Active Problem List    Diagnosis Date Noted     Rosacea 02/11/2016     Priority: Medium     Gout 03/22/2013     Priority: Medium     Anxiety state 01/10/2013     Priority: Medium     Problem list name updated by automated process. Provider to review       Constipation 01/10/2013     Priority: Medium     Problem list name updated by automated process. Provider to review       Depressive disorder, not elsewhere classified 01/10/2013     Priority: Medium     Esophageal reflux 01/10/2013     Priority: Medium     Major depressive disorder, recurrent episode (H) 01/10/2013     Priority: Medium     Problem list name updated by automated process. Provider to review       Health Care Home 01/10/2013     Priority: Medium     Tier Level: 1  DX V65.8 REPLACED WITH 45803 HEALTH CARE HOME (04/08/2013)         Social: He reports that he has never smoked. He has never used smokeless tobacco. He reports that he does not drink alcohol or use drugs.    There are no exam notes on file for this visit.    Objective     Vitals:    02/26/21 1128 02/26/21 1133   BP: (!) 151/81 (!) 146/83   BP Location: Right arm Right arm   Patient Position: Sitting Sitting   Cuff Size: Adult Regular Adult Regular   Pulse: 111    Resp: 18    Temp: 97.3  F (36.3  C)    TempSrc: Tympanic    SpO2: 98%    Weight: 68.9 kg (152 lb)      Body mass index is 27.62 kg/m .    GEN: NAD, healthy, alert  HEENT: NC/AT, EOMI, normal conjunctivae/sclerae, clear oropharynx, MMM  RESP: Breathing comfortably on RA  MSK: no MSK  defects noted, gait is age appropriate w/o ataxia; RLE - right foot without edema, erythema, or open wound. There is dry skin will small superficial cracking in the skin. No obvious foreign body. Tenderness at the first MTP underneath the foot, but no pain on the medial side of in the toes. Full ROM of toes and foot.   SKIN: no suspicious lesions or rashes  NEURO: no obvious focal deficits  PSYCH: mentation appears normal, affect normal    Labs:  No visits with results within 1 Month(s) from this visit.   Latest known visit with results is:   Office Visit on 10/21/2020   Component Date Value Ref Range Status     Uric Acid 10/21/2020 7.8  3.0 - 8.0 mg/dL Final     Urea Nitrogen 10/21/2020 23.7* 7.0 - 21.0 mg/dL Final     Calcium 10/21/2020 9.3  8.5 - 10.1 mg/dL Final     Chloride 10/21/2020 101.9  98.0 - 110.0 mmol/L Final     Carbon Dioxide 10/21/2020 25.6  20.0 - 32.0 mmol/L Final     Creatinine 10/21/2020 1.1  0.7 - 1.3 mg/dL Final     Glucose 10/21/2020 114.6* 70.0 - 99.0 mg'dL Final     Potassium 10/21/2020 3.9  3.2 - 4.6 mmol/L Final     Sodium 10/21/2020 138.7  132.0 - 142.0 mmol/L Final     GFR Estimate 10/21/2020 68.7  >60.0 mL/min/1.7 m2 Final     GFR Estimate If Black 10/21/2020 83.1  >60.0 mL/min/1.7 m2 Final

## 2021-03-02 ENCOUNTER — OFFICE VISIT (OUTPATIENT)
Dept: FAMILY MEDICINE | Facility: CLINIC | Age: 71
End: 2021-03-02
Payer: COMMERCIAL

## 2021-03-02 VITALS
BODY MASS INDEX: 26.93 KG/M2 | RESPIRATION RATE: 16 BRPM | OXYGEN SATURATION: 97 % | SYSTOLIC BLOOD PRESSURE: 133 MMHG | HEART RATE: 113 BPM | DIASTOLIC BLOOD PRESSURE: 79 MMHG | TEMPERATURE: 97.7 F | WEIGHT: 148.2 LBS

## 2021-03-02 DIAGNOSIS — M79.661 PAIN IN BOTH LOWER LEGS: ICD-10-CM

## 2021-03-02 DIAGNOSIS — M79.662 PAIN IN BOTH LOWER LEGS: ICD-10-CM

## 2021-03-02 DIAGNOSIS — H65.22 LEFT CHRONIC SEROUS OTITIS MEDIA: Primary | ICD-10-CM

## 2021-03-02 DIAGNOSIS — G89.29 CHRONIC RIGHT SHOULDER PAIN: ICD-10-CM

## 2021-03-02 DIAGNOSIS — R79.89 ELEVATED SERUM CREATININE: ICD-10-CM

## 2021-03-02 DIAGNOSIS — M25.511 CHRONIC RIGHT SHOULDER PAIN: ICD-10-CM

## 2021-03-02 LAB
% IMMATURE GRANULOCYTES: 1 %
ABS IMMATURE GRANULOCYTES: 0 THOU/UL
BASOPHILS # BLD AUTO: 0 THOU/UL (ref 0–0.2)
BASOPHILS NFR BLD AUTO: 1 % (ref 0–2)
BUN SERPL-MCNC: 21.8 MG/DL (ref 7–21)
CALCIUM SERPL-MCNC: 9.5 MG/DL (ref 8.5–10.1)
CHLORIDE SERPLBLD-SCNC: 102.4 MMOL/L (ref 98–110)
CHOLEST SERPL-MCNC: 140.3 MG/DL (ref 0–200)
CHOLEST/HDLC SERPL: 3.5 {RATIO} (ref 0–5)
CO2 SERPL-SCNC: 29.6 MMOL/L (ref 20–32)
CREAT SERPL-MCNC: 1 MG/DL (ref 0.7–1.3)
CRP SERPL-MCNC: 0.4 MG/DL (ref 0–0.8)
EOSINOPHIL # BLD AUTO: 0.1 THOU/UL (ref 0–0.4)
EOSINOPHIL NFR BLD AUTO: 3 % (ref 0–6)
ERYTHROCYTE [DISTWIDTH] IN BLOOD BY AUTOMATED COUNT: 13.2 % (ref 11–14.5)
ERYTHROCYTE [SEDIMENTATION RATE] IN BLOOD: 8 MM/HR (ref 0–15)
FERRITIN SERPL-MCNC: 2088 NG/ML (ref 27–300)
GFR SERPL CREATININE-BSD FRML MDRD: 76.4 ML/MIN/1.7 M2
GLUCOSE SERPL-MCNC: 118.5 MG'DL (ref 70–99)
HCT VFR BLD AUTO: 52.3 % (ref 40–54)
HDLC SERPL-MCNC: 40.1 MG/DL
HGB BLD-MCNC: 17 G/DL (ref 14–18)
LDLC SERPL CALC-MCNC: 60 MG/DL (ref 0–129)
LYMPHOCYTES # BLD AUTO: 1.3 THOU/UL (ref 0.8–4.4)
LYMPHOCYTES NFR BLD AUTO: 29 % (ref 20–40)
MCH RBC QN AUTO: 28.2 PG (ref 27–34)
MCHC RBC AUTO-ENTMCNC: 32.5 G/DL (ref 32–36)
MCV RBC AUTO: 87 FL (ref 80–100)
MONOCYTES # BLD AUTO: 0.5 THOU/UL (ref 0–0.9)
MONOCYTES NFR BLD AUTO: 11 % (ref 2–10)
NEUTROPHILS # BLD AUTO: 2.4 THOU/UL (ref 2–7.7)
NEUTROPHILS NFR BLD AUTO: 56 % (ref 50–70)
PLATELET # BLD AUTO: 159 THOU/UL (ref 140–440)
PMV BLD AUTO: 10 FL (ref 8.5–12.5)
POTASSIUM SERPL-SCNC: 4.1 MMOL/L (ref 3.2–4.6)
RBC # BLD AUTO: 6.03 MILL/UL (ref 4.4–6.2)
SODIUM SERPL-SCNC: 139.3 MMOL/L (ref 132–142)
T4 FREE SERPL-MCNC: 1.2 NG/DL (ref 0.7–1.8)
TRIGL SERPL-MCNC: 201.7 MG/DL (ref 0–150)
TSH SERPL DL<=0.05 MIU/L-ACNC: 0.02 UIU/ML (ref 0.3–5)
VIT B12 SERPL-MCNC: 307 PG/ML (ref 213–816)
VLDL CHOLESTEROL: 40.3 MG/DL (ref 7–32)
WBC # BLD AUTO: 4.3 THOU/UL (ref 4–11)

## 2021-03-02 PROCEDURE — 36415 COLL VENOUS BLD VENIPUNCTURE: CPT | Performed by: STUDENT IN AN ORGANIZED HEALTH CARE EDUCATION/TRAINING PROGRAM

## 2021-03-02 PROCEDURE — 80048 BASIC METABOLIC PNL TOTAL CA: CPT | Performed by: STUDENT IN AN ORGANIZED HEALTH CARE EDUCATION/TRAINING PROGRAM

## 2021-03-02 PROCEDURE — 99214 OFFICE O/P EST MOD 30 MIN: CPT | Performed by: STUDENT IN AN ORGANIZED HEALTH CARE EDUCATION/TRAINING PROGRAM

## 2021-03-02 PROCEDURE — 80061 LIPID PANEL: CPT | Performed by: STUDENT IN AN ORGANIZED HEALTH CARE EDUCATION/TRAINING PROGRAM

## 2021-03-02 RX ORDER — ACETIC ACID 20.65 MG/ML
4 SOLUTION AURICULAR (OTIC) 3 TIMES DAILY
Qty: 15 ML | Refills: 0 | Status: SHIPPED | OUTPATIENT
Start: 2021-03-02 | End: 2021-04-02

## 2021-03-02 RX ORDER — AMOXICILLIN 875 MG
875 TABLET ORAL 2 TIMES DAILY
Qty: 20 TABLET | Refills: 0 | Status: SHIPPED | OUTPATIENT
Start: 2021-03-02 | End: 2021-04-02

## 2021-03-02 RX ORDER — FLUTICASONE PROPIONATE 50 MCG
1 SPRAY, SUSPENSION (ML) NASAL DAILY
Qty: 15.8 ML | Refills: 0 | Status: SHIPPED | OUTPATIENT
Start: 2021-03-02 | End: 2022-01-19

## 2021-03-02 NOTE — PROGRESS NOTES
Nursing Notes:   Lesly Garcia, SOFIE  3/2/2021  2:12 PM  Signed  Due to patient being non-English speaking/uses sign language, an  was used for this visit. Only for face-to-face interpretation by an external agency, date and length of interpretation can be found on the scanned worksheet.     name: Fletcher Isabel  Agency: [employer]  Language: Rufina   Telephone number: [phone]  Type of interpretation: Telephone, spoken          SUBJECTIVE  Cristina Amina Lyles is a 70 year old male with past medical history significant for    Patient Active Problem List   Diagnosis     Anxiety state     Constipation     Depressive disorder, not elsewhere classified     Esophageal reflux     Major depressive disorder, recurrent episode (H)     Health Care Home     Gout     Rosacea     Others present at the visit:  Fletcher Isabel presents via telephone.    Presents for   Chief Complaint   Patient presents with     Ear Problem     Pt is here to have his ear looked at.  Pt states he hears a lot of ringing like insects singing in his ear.     Flu Shot     Pt will have his flu shot today.     Medication Reconciliation     Complete.      Patient presents for couple of concerns.  First full is having trouble with ringing in both of his ears.  This started initially when he moved to [place], and has been intermittent.  Typically he will have symptoms for couple of days and then it gets better, but recently it has been worse.  He describes it as like the sound of an insect stinging inside of his ear.  It is worse on the left but also present on the right.  He denies any trouble hearing.  No pain, no congestion, but he feels like his head is a little foggy and describes a mild headache located in the school region in general.  He describes it as feeling heavy inside his brain.  No runny nose, no nasal congestion, no sinus symptoms currently.  He denies any dizziness or lightheadedness.  No balance issues.  No nausea or  vomiting.  It is quite bothersome for him.  He has tried using some type of solution and Q-tips in his ear without improvement.  Does help some with the itching.    He also is complaining of bilateral leg pain.  This is present in his thighs as well as his calf and lower leg.  Bothers him most with pushing on the area, and at night, with difficult to sleep.  This is a new pain that started last week.  He steps on something with his foot and is continuing to have pain in the right plantar region.  Because of this he received a Tdap shot at his last visit.  Attributes the worsening pain in his legs to the shot.  Describes it as a heavy pain.  It hurts when he presses on his legs, and is not so bad when he is not pushing.  His legs feel fatigued and heavy.  He denies any swelling, no rash, no warmth, no redness.  He does have a history of gout but denies that this feels like gout.    He has not tried taking any medications for his pain, but has continued to take the 2 gout medicines that he has been on long-term, allopurinol and colchicine.    He has questions about whether or not it is safe for him to get a second Covid shot in 2 weeks because of the symptoms he is having currently.    OBJECTIVE:  Vitals: /79   Pulse 113   Temp 97.7  F (36.5  C) (Oral)   Resp 16   Wt 67.2 kg (148 lb 3.2 oz)   SpO2 97%   BMI 26.93 kg/m    BMI= Body mass index is 26.93 kg/m .  Objective:    Vitals:  Vitals are reviewed and are within the normal range  Gen:  Alert, pleasant, no acute distress, does appear somewhat worried.  HEENT: His right ear shows normal tympanic membrane, but some evidence of scaling and excoriations inside the canal.  In his left ear, there is also some small amount of wax, with fluid present behind the drum.  Nonerythematous, nonbulging.  There are some bubbles in the fluid.  He has no pain with palpation of the mastoid area, no TMJ pain.  No anterior or posterior auricular lymph nodes.  Mild occipital  pain with no occipital lymph node enlargement.  No frontal or maxillary sinus tenderness.  He does have some enlarged turbinates and mild amount of congestion.  Throat is clear nonerythematous and without exudate.  No anterior or posterior cervical lymphadenopathy.  Cardiac:  Regular rate and rhythm, no murmurs, rubs or gallops  Respiratory:  Lungs clear to auscultation bilaterally  Abdomen:  Soft, non-tender, non-distended, bowel sounds positive  Extremities:  Warm, well-perfused, moderate tenderness with palpation of the calves bilaterally.  Negative Homans' sign.  No erythema, swelling, or lower extremity edema.  Legs and feet are somewhat cool.  Pulses are palpable but mildly diminished.  Gait is normal.  Strength is intact.    We reviewed his lab test from the last visit which include an A1c of 5.7, and a uric acid of 8.4.      ASSESSMENT AND PLAN:      Cristina was seen today for ear problem, flu shot and medication reconciliation.  Multiple issues were discussed today.    Diagnoses and all orders for this visit:    Left chronic serous otitis media, patient with tinnitus, and left serous otitis.  Will treat with antibiotics as well as VoSol for the itching in the drums.  Will use Flonase to help clear out the eustachian tube.  Discussed that this may also be eustachian tube dysfunction and may need an ENT referral as this can be hard to treat.  Recheck at wellness check and refer if not improved.    -     amoxicillin (AMOXIL) 875 MG tablet; Take 1 tablet (875 mg) by mouth 2 times daily  -     acetic acid (VOSOL) 2 % otic solution; Place 4 drops into both ears 3 times daily  -     fluticasone (FLONASE) 50 MCG/ACT nasal spray; Spray 1 spray into both nostrils daily    Bilateral leg pain.  Timing coresponds with TDAP shot, but not typically a side effect.  Does still have pain on the sole of the foot and feels there might be glass or other small particle inside.  No visible on xray.  Will treat symptomatically with  warm water soaks for feet and naproxed.  Check for inflammatory markers.  Not consistent with gout on exam.    -     naproxen (NAPROSYN) 375 MG tablet; Take 1 tablet (375 mg) by mouth 2 times daily as needed for moderate pain    Elevated serum creatinine.  Do for repeat creatinine and lipids--will draw in preparation for upcoming wellness visits.    -     Lipid Panel ([facility])  -     Basic Metabolic Panel ([facility])  -     Erythrocyte Sed Rate ([facility])    Pain in both lower legs.  Unclear etiology.  Will look for possible causes with lab testing.  Consider restless legs.  Does not seem consistent with gout, though uric acid levels were high last week.    -     C-Reactive Protein ([facility])  -     Cancel: CBC with Diff Plt (P )  -     Cancel: Erythrocyte Sedimentation Rate (P )  -     Vitamin B12 ([facility])  -     Vitamin D 25-Hydroxy ([facility])  -     Ferritin ([facility])  -     Thyroid [place] ([facility])  -     Erythrocyte Sed Rate ([facility])  -     CBC w/ Diff and Plt ([facility])    Reassured patient that it should be okay to move forward with his COVID shot next week.      Patient Instructions   Drops for the ears.   (2x per day)  Nasal spray for nose  (1x per day  Antibiotic pills for fluid behind ears.   (2x per day for 10 days)  Naproxen for the pain in leg pain.  Up to 2x per day for pain in legs.  Take with food.      Stop in lab for blood work.     Follow up end of the month.        Cintia Ortiz MD

## 2021-03-02 NOTE — PATIENT INSTRUCTIONS
Drops for the ears.   (2x per day)  Nasal spray for nose  (1x per day  Antibiotic pills for fluid behind ears.   (2x per day for 10 days)  Naproxen for the pain in leg pain.  Up to 2x per day for pain in legs.  Take with food.      Stop in lab for blood work.     Follow up end of the month.

## 2021-03-02 NOTE — LETTER
March 4, 2021      Cristina Lyles  [address]  [facility] [address]        Dear ,    We are writing to inform you of your test results.    We have the rest of your lab tests back.  They show an elevated ferritin level--which can just mean inflammation, but can also mean high iron levels.  It also shows some changes in your thyroid hormone levels and low vitamin D.  I would like to talk to you about all of the results, see if your legs are feeling better and run some additional blood test.  Could you come in some time next week for a follow up visit?  We can draw some additional labs and I can talk through all the results and see if you are feeling better?    Resulted Orders   C-Reactive Protein ([facility])   Result Value Ref Range    C-Reactive Protein 0.4 0.0 - 0.8 mg/dL    Narrative    Test performed by:  [facility]  [address] [place] [address]   Vitamin B12 ([facility])   Result Value Ref Range    Vitamin B12 307 213 - 816 pg/mL    Narrative    Test performed by:  [facility]  [address] [place] [address]   Vitamin D 25-Hydroxy ([facility])   Result Value Ref Range    Vitamin D,25-Hydroxy 14.8 (L) 30.0 - 80.0 ng/mL    Narrative    Test performed by:  [facility] [address] [place] [address]  Deficiency <10.0 ng/mL  Insufficiency 10.0-29.9 ng/mL  Sufficiency 30.0-80.0 ng/mL  Toxicity (possible) >100.0 ng/mL   Ferritin ([facility])   Result Value Ref Range    Ferritin 2,088 (H) 27 - 300 ng/mL    Narrative    Test performed by:  [facility]  [address] [place] [address]   Thyroid [place] ([facility])   Result Value Ref Range    TSH 0.02 (L) 0.30 - 5.00 uIU/mL    Narrative    Test performed by:  [facility] [address] [place] [address]   Lipid Panel ([place])   Result Value Ref Range    Cholesterol 140.3  0.0 - 200.0 mg/dL    Cholesterol/HDL Ratio 3.5 0.0 - 5.0    HDL Cholesterol 40.1 >40.0 mg/dL    LDL Cholesterol Calculated 60 0 - 129 mg/dL    Triglycerides 201.7 (H) 0.0 - 150.0 mg/dL    VLDL Cholesterol 40.3 (H) 7.0 - 32.0 mg/dL   Basic Metabolic Panel ([place])   Result Value Ref Range    Urea Nitrogen 21.8 (H) 7.0 - 21.0 mg/dL    Calcium 9.5 8.5 - 10.1 mg/dL    Chloride 102.4 98.0 - 110.0 mmol/L    Carbon Dioxide 29.6 20.0 - 32.0 mmol/L    Creatinine 1.0 0.7 - 1.3 mg/dL    Glucose 118.5 (H) 70.0 - 99.0 mg'dL    Potassium 4.1 3.2 - 4.6 mmol/L    Sodium 139.3 132.0 - 142.0 mmol/L    GFR Estimate 76.4 >60.0 mL/min/1.7 m2    GFR Estimate If Black >90 >60.0 mL/min/1.7 m2   Erythrocyte Sed Rate ([facility])   Result Value Ref Range    Sed Rate 8 0 - 15 mm/hr    Narrative    Test performed by:  [facility] [address] [place] [address]   CBC w/ Diff and Plt ([facility])   Result Value Ref Range    WBC 4.3 4.0 - 11.0 thou/uL    RBC 6.03 4.40 - 6.20 mill/uL    Hemoglobin 17.0 14.0 - 18.0 g/dL    Hematocrit 52.3 40.0 - 54.0 %    MCV 87 80 - 100 fL    MCH 28.2 27.0 - 34.0 pg    MCHC 32.5 32.0 - 36.0 g/dL    RDW 13.2 11.0 - 14.5 %    Platelets 159 140 - 440 thou/uL    Mean Platelet Volume 10.0 8.5 - 12.5 fL    % Neutrophils 56 50 - 70 %    % Lymphocytes 29 20 - 40 %    % Monocytes 11 (H) 2 - 10 %    % Eosinophils 3 0 - 6 %    % Basophils 1 0 - 2 %    % Immature Granulocytes 1 (H) <=0 %    Neutrophils (Absolute) 2.4 2.0 - 7.7 thou/uL    Lymphs (Absolute) 1.3 0.8 - 4.4 thou/uL    Monocytes(Absolute) 0.5 0.0 - 0.9 thou/uL    Eos (Absolute) 0.1 0.0 - 0.4 thou/uL    Baso (Absolute) 0.0 0.0 - 0.2 thou/uL    Abs Immature Granulocytes 0.0 <=0.0 thou/uL    Narrative    Test performed by:  [facility]  [address] [place] [address]   T4  Free ([facility])   Result Value Ref Range    T4 Free 1.2 0.7 - 1.8 ng/dL    Narrative    Test performed by:   [facility] [address] [place] [address]       If you have any questions or concerns, please call the clinic at the number listed above.       Sincerely,      Cinita Ortiz MD

## 2021-03-02 NOTE — NURSING NOTE
Due to patient being non-English speaking/uses sign language, an  was used for this visit. Only for face-to-face interpretation by an external agency, date and length of interpretation can be found on the scanned worksheet.     name: Fletcher Isabel  Agency: [employer]  Language: Rufina   Telephone number: [phone]  Type of interpretation: Telephone, spoken

## 2021-03-03 LAB — 25(OH)D3 SERPL-MCNC: 14.8 NG/ML (ref 30–80)

## 2021-03-04 NOTE — RESULT ENCOUNTER NOTE
Cristina Lyles-    We have the rest of your lab tests back.  They show an elevated ferritin level--which can just mean inflammation, but can also mean high iron levels.  It also shows some changes in your thyroid hormone levels and low vitamin D.  I would like to talk to you about all of the results, see if your legs are feeling better and run some additional blood test.  Could you come in some time next week for a follow up visit?  We can draw some additional labs and I can talk through all the results and see if you are feeling better?      MD Lesly Kaplan, can we have one of our [facility] speaking staff call this patient to relay the message and get him scheduled for Tuesday next week to discuss results and get some additional blood work?  Thanks!

## 2021-03-13 ENCOUNTER — IMMUNIZATION (OUTPATIENT)
Dept: FAMILY MEDICINE | Facility: CLINIC | Age: 71
End: 2021-03-13
Attending: FAMILY MEDICINE
Payer: COMMERCIAL

## 2021-03-13 PROCEDURE — 91300 PR COVID VAC PFIZER DIL RECON 30 MCG/0.3 ML IM: CPT

## 2021-03-13 PROCEDURE — 0002A PR COVID VAC PFIZER DIL RECON 30 MCG/0.3 ML IM: CPT

## 2021-04-02 ENCOUNTER — OFFICE VISIT (OUTPATIENT)
Dept: FAMILY MEDICINE | Facility: CLINIC | Age: 71
End: 2021-04-02
Payer: COMMERCIAL

## 2021-04-02 VITALS
SYSTOLIC BLOOD PRESSURE: 116 MMHG | TEMPERATURE: 98.1 F | DIASTOLIC BLOOD PRESSURE: 74 MMHG | BODY MASS INDEX: 27.47 KG/M2 | WEIGHT: 151.2 LBS | RESPIRATION RATE: 16 BRPM | HEART RATE: 81 BPM | OXYGEN SATURATION: 96 %

## 2021-04-02 DIAGNOSIS — R06.83 SNORING: ICD-10-CM

## 2021-04-02 DIAGNOSIS — G44.209 TENSION HEADACHE: ICD-10-CM

## 2021-04-02 DIAGNOSIS — Z00.00 PREVENTATIVE HEALTH CARE: Primary | ICD-10-CM

## 2021-04-02 DIAGNOSIS — R79.89 ELEVATED FERRITIN: ICD-10-CM

## 2021-04-02 DIAGNOSIS — J30.2 SEASONAL ALLERGIC RHINITIS, UNSPECIFIED TRIGGER: ICD-10-CM

## 2021-04-02 DIAGNOSIS — H93.13 TINNITUS OF BOTH EARS: ICD-10-CM

## 2021-04-02 DIAGNOSIS — L29.9 ITCHING: ICD-10-CM

## 2021-04-02 DIAGNOSIS — R79.89 ABNORMAL TSH: ICD-10-CM

## 2021-04-02 LAB
% IMMATURE GRANULOCYTES: 0 % (ref 0–0)
ABS IMMATURE GRANULOCYTES: 0 10E9/L (ref 0–0)
ALBUMIN SERPL-MCNC: 4.6 MG/DL (ref 3.3–4.9)
ALP SERPL-CCNC: 82.3 U/L (ref 40–150)
ALT SERPL-CCNC: 43.1 U/L (ref 0–45)
AST SERPL-CCNC: 31.8 U/L (ref 0–55)
BASOPHILS # BLD AUTO: 0 10E9/L (ref 0–0.2)
BASOPHILS NFR BLD AUTO: 1 % (ref 0–2)
BILIRUB SERPL-MCNC: 0.7 MG/DL (ref 0.2–1.3)
BILIRUBIN DIRECT: 0.2 MG/DL (ref 0–0.2)
CRP SERPL-MCNC: 0.2 MG/DL (ref 0–0.8)
EOSINOPHIL # BLD AUTO: 0.1 10E9/L (ref 0–0.7)
EOSINOPHIL NFR BLD AUTO: 2 % (ref 0–6)
ERYTHROCYTE [DISTWIDTH] IN BLOOD BY AUTOMATED COUNT: 14 % (ref 10–15)
ERYTHROCYTE [SEDIMENTATION RATE] IN BLOOD: 3 MM/HR (ref 0–20)
FERRITIN SERPL-MCNC: 1263 NG/ML (ref 27–300)
HCT VFR BLD AUTO: 54.9 % (ref 40–53)
HEMOGLOBIN: 17.8 G/DL (ref 13.3–17.7)
HIV 1+2 AB+HIV1 P24 AG SERPL QL IA: NEGATIVE
LYMPHOCYTES # BLD AUTO: 1.4 10E9/L (ref 0.8–5.3)
LYMPHOCYTES NFR BLD AUTO: 26.5 % (ref 20–48)
MCH RBC QN AUTO: 27.9 PG (ref 26.5–35)
MCHC RBC AUTO-ENTMCNC: 32.4 G/DL (ref 32–36)
MCV RBC AUTO: 85.9 FL (ref 78–100)
MONOCYTES # BLD AUTO: 0.4 10E9/L (ref 0–1.3)
MONOCYTES NFR BLD AUTO: 8 % (ref 0–12)
NEUTROPHILS # BLD AUTO: 3.4 10E9/L (ref 1.6–8.3)
NEUTROPHILS NFR BLD AUTO: 63.6 % (ref 40–75)
PLATELET # BLD AUTO: 145 10E9/L (ref 150–450)
PROT SERPL-MCNC: 7.9 G/DL (ref 6.8–8.8)
RBC # BLD AUTO: 6.4 10E12/L (ref 4.4–5.9)
T4 FREE SERPL-MCNC: 0.8 NG/DL (ref 0.7–1.8)
TSH SERPL DL<=0.05 MIU/L-ACNC: 2.52 UIU/ML (ref 0.3–5)
WBC # BLD AUTO: 5.4 10E9/L (ref 4–11)

## 2021-04-02 PROCEDURE — 99397 PER PM REEVAL EST PAT 65+ YR: CPT | Performed by: STUDENT IN AN ORGANIZED HEALTH CARE EDUCATION/TRAINING PROGRAM

## 2021-04-02 PROCEDURE — 80076 HEPATIC FUNCTION PANEL: CPT | Performed by: STUDENT IN AN ORGANIZED HEALTH CARE EDUCATION/TRAINING PROGRAM

## 2021-04-02 PROCEDURE — 85025 COMPLETE CBC W/AUTO DIFF WBC: CPT | Performed by: STUDENT IN AN ORGANIZED HEALTH CARE EDUCATION/TRAINING PROGRAM

## 2021-04-02 PROCEDURE — 36415 COLL VENOUS BLD VENIPUNCTURE: CPT | Performed by: STUDENT IN AN ORGANIZED HEALTH CARE EDUCATION/TRAINING PROGRAM

## 2021-04-02 PROCEDURE — 85651 RBC SED RATE NONAUTOMATED: CPT | Performed by: STUDENT IN AN ORGANIZED HEALTH CARE EDUCATION/TRAINING PROGRAM

## 2021-04-02 RX ORDER — OLOPATADINE HYDROCHLORIDE 1 MG/ML
1 SOLUTION/ DROPS OPHTHALMIC 2 TIMES DAILY
Qty: 5 ML | Refills: 1 | Status: SHIPPED | OUTPATIENT
Start: 2021-04-02 | End: 2022-01-19

## 2021-04-02 RX ORDER — CETIRIZINE HYDROCHLORIDE 10 MG/1
TABLET ORAL
Qty: 30 TABLET | Refills: 1 | Status: SHIPPED | OUTPATIENT
Start: 2021-04-02 | End: 2021-07-20

## 2021-04-02 NOTE — PROGRESS NOTES
65+ Annual Wellness Visit         HPI     This 70 year old male presents as an established patient  Cintia Ortiz who presents for a Initial Medicare Wellness Visit    Other issues patient wants to be addressed today:      Patient would also like to briefly follow-up on his last visit.  He has noticed some mild improvement in his ringing in the ears with using the nose spray and the eardrops.  Still quite bothersome for him and worse in the left and right ear.  Seems to cause some headaches, which she describes as being present on the top of his head on and on both sides.  He would like to see an ENT for further evaluation given his discomfort from the symptoms.    He shares that his nasal congestion and runny nose has improved.  He likes using the nasal spray.  This is been helpful.  He does have some crusting and irritation in his eyes, in particular in the morning and would like to try medication to help with this.    His leg pain is feeling better.  He still gets bilateral pain in his knees with walking up and down the stairs, no longer having severe pain down into his legs.  Got his second COVID-19 shot without complication.    We discussed his elevated hemoglobin.  He does endorse snoring, and that his family hears him snoring at night.  He thinks he could have obstructive sleep apnea.  Is interested in moving forward with the sleep study but would like to start with the ENT visit first.    We discussed his abnormal thyroid testing.  Discussed his cholesterol, which includes an LDL of 60, which is very good.    We reviewed his substance use history.  He has smoked 1 cigarette once when he was a kid and has not used any tobacco since then.    He is  from his wife, and currently living with his son as well as his son's 4 children and his daughter-in-law.  Works on eating healthy and eats a variety of foods.  Shares that his daughter-in-law makes him some shakes, which sound like protein shakes.  He  reports good exercise tolerance and that he can walk up to 20 to 30 minutes without difficulty.    Chief Complaint   Patient presents with     Wellness Visit     Pt is here for his 65+ Wellness exam today.     Medication Reconciliation     Complete.        Patient Active Problem List   Diagnosis     Anxiety state     Constipation     Depressive disorder, not elsewhere classified     Esophageal reflux     Major depressive disorder, recurrent episode (H)     Health Care Home     Gout     Rosacea       Past Medical History:   Diagnosis Date     Depressive disorder         Family History   Problem Relation Age of Onset     Diabetes No family hx of      Coronary Artery Disease No family hx of      Breast Cancer No family hx of      Colon Cancer No family hx of      Prostate Cancer No family hx of      Other Cancer No family hx of      Cancer No family hx of      Heart Disease No family hx of          Problem List, Family History and past Medical History reviewed and unchanged/updated.  Nursing Notes:   Lesly Garcia CMA  4/2/2021  8:30 AM  Signed  Due to patient being non-English speaking/uses sign language, an  was used for this visit. Only for face-to-face interpretation by an external agency, date and length of interpretation can be found on the scanned worksheet.       name: Paulette Hubbard  Language: Rufina  Agency:  [employer]  Telephone number: [phone]  Type of interpretation:  Face-to-face, spoken      Lesly Garcia CMA  4/2/2021  8:30 AM  Signed  Medicare Wellness Visit  Health Risk Assessment      Visual Acuity:  Right Eye: 10/16   Left Eye: 10/12.5  Both Eyes: 10/8        FALL RISK ASSESSMENT 4/2/2021 5/14/2019   Fallen 2 or more times in the past year? No No   Any fall with injury in the past year? No No            Health Risk Assessment / Review of Systems     Constitutional: Any fevers or night sweats? No     Eyes:  Vision problems   No     Hearing Do you feel you have  hearing loss?   YES     Cardiovascular: Any chest pain, fast or irregular heart beat, calf pain with walking?     No           Respiratory:   Any breathing problems or cough?   No     Gastrointestinal: Any stomach or stool problems?   No      Genitourinary: Do you have difficulty controlling urination?   No     Muscles and Joints: Any joint stiffness or soreness?    YES     Skin: Any concerning lesions or moles?   No     Nervous System: Any loss of strength or feeling, numbness or tingling, shaking, dizziness, or headache?   YES     Mental Health: Any depression, anxiety or problems sleeping?     YES     Cognition: Do you have any problems with your memory?  No     PHQ-2 Score:   PHQ-2 ( 1999 Pfizer) 4/2/2021 10/21/2020   Q1: Little interest or pleasure in doing things 0 0   Q2: Feeling down, depressed or hopeless 0 0   PHQ-2 Score 0 0       PHQ-9 Score:   [facility] Follow-up to PHQ 4/17/2018 12/12/2018 2/26/2021   PHQ-9 9. Suicide Ideation past 2 weeks Not at all Not at all Not at all            Medical Care     What other specialists or organizations are involved in your medical care?    Patient Care Team       Relationship Specialty Notifications Start End    Cintia Ortiz MD PCP - General   1/8/13     Phone: [phone] Fax: [phone] [address] [place] [address]    Cintia Ortiz MD Assigned PCP   11/15/20     Phone: [phone] Fax: [phone]         [address] [place] [address]                 Social History / Home Safety     Social History     Tobacco Use     Smoking status: Never Smoker     Smokeless tobacco: Never Used   Substance Use Topics     Alcohol use: No     Marital Status:Partnered  Who lives in your household? Self and son    Does your home have any of the following safety concerns? Loose rugs in the hallway, no grab bars in the bathroom, no handrails on the stairs or have poorly lit areas?  No     Do you feel threatened or controlled by a partner, ex-partner or  "anyone in your life? No     Has anyone hurt you physically, for example by pushing, hitting, slapping or kicking you   or forcing you to have sex? No          Functional Status     Do you need help with dressing yourself, bathing, or walking?No     Do you need help with the phone, transportation, shopping, preparing meals, housework, laundry, medications or managing money?No       Risk Behaviors and Healthy Habits     History   Smoking Status     Never Smoker   Smokeless Tobacco     Never Used     How many servings of fruits and vegetables do you eat a day? 2-3    Exercise: 2-3 days/week for an average of less than 15 minutes walking      Do you frequently drive without a seatbelt? No     Do you use any other drugs? No         Do you use alcohol?No      Frailty Assessment            1. By yourself and note using aids, do you have difficulty walking up 10 steps without resting?  No  (1 for Yes, 0 for No)    2. By yourself and not using mobility aids, do you have any difficulty walking several hundred yards? No  (1 for Yes, 0 for No)    3. Have you lost 10 or more pounds unintentionally in the previous year? No  (If \"Yes\" and >5% weight loss, then score 1.  Score 0, if <5% weight loss or \"No\" weight loss)    4. How much of the times during the past 3 weeks did you feel tired? 3. Some of the time (\"1\" or \"2\" are scored 1, others 0)    5.  A doctor told the patient they had the following illnesses:  None    Lesly Garcia [facility]            Frailty Assessment            1. 0    2. 0    3. 0    4. 0    5. Count number of illnesses. 0    Total score: 0    Frailty screen score:  Robust    EVALUATION OF COGNITIVE FUNCTION     Mood/affect:Normal  Appearance:Normal  Family member/caregiver input: None available.      Three item recall 3/3.  Animal namin in 1 minute.    Cognitive screen is:Negative      Other Assessments     CV Risk based on Pooled Cohort Risk (consider assessing every 4-6 years; consider statin in " patients with 10-yr risk > 7.5%):   The 10-year ASCVD risk score (Jomarmichelle PARR Jr., et al., 2013) is: 13.2%    Values used to calculate the score:      Age: 70 years      Sex: Male      Is Non- : No      Diabetic: No      Tobacco smoker: No      Systolic Blood Pressure: 116 mmHg      Is BP treated: No      HDL Cholesterol: 40.1 mg/dL      Total Cholesterol: 140.3 mg/dL    Statin not prescribed.      Advance Directives: Discussed with patient and family as appropriate.  Has patient completed advance directives and/or a living will? No       Immunization History   Administered Date(s) Administered     COVID-19,PF,Pfizer 02/20/2021, 03/13/2021     Influenza (IIV3) PF 01/26/2005, 02/12/2007, 09/29/2010, 10/24/2013, 12/01/2015, 12/18/2017     Influenza Vaccine IM > 6 months Valent IIV4 12/26/2016     Influenza Vaccine Im 4yrs+ 4 Valent CCIIV4 12/18/2017     Pneumo Conj 13-V (2010&after) 04/27/2018     Pneumococcal 23 valent 05/14/2019     TD (ADULT, 7+) 11/16/1998     Tdap (Adacel,Boostrix) 09/29/2010, 02/26/2021     Tdap (Adult) Unspecified Formulation 09/08/2009     Reviewed Immunization Record Today         Physical Exam     Vitals: /74   Pulse 81   Temp 98.1  F (36.7  C) (Oral)   Resp 16   Wt 68.6 kg (151 lb 3.2 oz)   SpO2 96%   BMI 27.47 kg/m    BMI= Body mass index is 27.47 kg/m .    Vitals:  Vitals are reviewed and are within the normal range  Gen:  Alert, pleasant, no acute distress  Head:  Normal cephalic, atraumatic  Ears:  Tympanic membranes viewed bilaterally, fluid present behind the TM bilaterally.  Canals are clear without evidence of irritation, no bulging or erythema.  Sinus: No frontal or maxillary sinus tenderness.  Bilateral turbinate hypertrophy with mild erythema.  Throat:  Clear.  Non-erythematous and without exudate  Neck:  No cervical lymphadenopathy  Cardiac:  Regular rate and rhythm, no murmurs, rubs or gallops  Respiratory:  Lungs clear to auscultation  bilaterally  Abdomen:  Soft, non-tender, non-distended, bowel sounds positive  Extremities:  Warm, well-perfused, pulses 2+/4, no lower extremity edema  Skin:  Mucous membranes moist.  No rash.   Capillary refill <2 secs.      Results for orders placed or performed in visit on 04/02/21   CBC with Diff Plt ([facility])     Status: Abnormal   Result Value Ref Range    WBC 5.4 4.0 - 11.0 10e9/L    RBC 6.4 (H) 4.4 - 5.9 10e12/L    Hemoglobin 17.8 (H) 13.3 - 17.7 g/dL    Hematocrit 54.9 (H) 40.0 - 53.0 %    MCV 85.9 78.0 - 100.0 fL    MCH 27.9 26.5 - 35.0 pg    MCHC 32.4 32.0 - 36.0 g/dL    RDW 14 10 - 15 %    Platelets 145.0 (L) 150.0 - 450.0 10e9/L    % Neutrophils 63.6 40.0 - 75.0 %    % Lymphocytes 26.5 20.0 - 48.0 %    % Monocytes 8 0 - 12 %    % Eosinophils 2 0 - 6 %    % Basophils 1 0 - 2 %    % Immature Granulocytes 0 0 - 0 %    Absolute Neutrophil 3.4 1.6 - 8.3 10e9/L    Lymphocytes # 1.4 0.8 - 5.3 10e9/L    Absolute Monocytes 0.4 0.0 - 1.3 10e9/L    Absolute Eosinophils 0.1 0.0 - 0.7 10e9/L    Absolute Basophils 0.0 0.0 - 0.2 10e9/L    Abs Immature Granulocytes 0.0 0.0 - 0.0 10e9/L   Hepatic Panel ([place])     Status: None   Result Value Ref Range    Bilirubin Total 0.7 0.2 - 1.3 mg/dL    Albumin 4.6 3.3 - 4.9 mg/dL    Alkaline Phosphatase 82.3 40.0 - 150.0 U/L    ALT 43.1 0.0 - 45.0 U/L    AST 31.8 0.0 - 55.0 U/L    Bilirubin Direct 0.2 0.0 - 0.2 mg/dL    Protein Total 7.9 6.8 - 8.8 g/dL   Erythrocyte Sedimentation Rate ([facility])     Status: None   Result Value Ref Range    Sed Rate 3 0 - 20 mm/hr             Assessment and Plan       Reviewed Preventive Services and Plan form with patient as specified in Patient Instructions.    Positive findings on assessment: Patient is due for colon cancer screening.  He would like to move forward with FIT testing at this time.  He does not qualify for lung cancer screening or AAA screening as he has only smoked 1 cigarette in his life.  Is up-to-date on  immunizations.    Cristina was seen today for wellness visit and medication reconciliation.    Diagnoses and all orders for this visit:    Preventative health care  -     HIV Ag/Ab Screen [place] ([facility])  -     Hepatitis B Surface Ag ([facility])  -     Hepatitis B Surface Ab ([facility])  -     Hepatitis B Core Ab ([facility])  -     Fecal Occult Blood - FIT, iFOB (P ); Future    Elevated ferritin  -     CBC with Diff Plt ([facility])  -     Ferritin ([facility])  -     Hepatic Panel ([place])  -     C-Reactive Protein ([facility])  -     Erythrocyte Sedimentation Rate ([facility])    Abnormal TSH  -     TSH  Sensitive ([facility])  -     T4  Free ([facility])  -     Thyroid peroxidase antibody    Tinnitus of both ears  -     OTOLARYNGOLOGY REFERRAL; Future    Snoring  -     SLEEP EVALUATION & MANAGEMENT REFERRAL - ADULT -Other (Respond in commments) ([facility] location); Future    Itching  -     cetirizine (ZYRTEC) 10 MG tablet; TAKE 1 TABLET (10 MG) BY MOUTH DAILY / TXHUA HNUB NOJ 1 LUB TSHUAJ PAB CORINE ALLERGY    Seasonal allergic rhinitis, unspecified trigger  -     olopatadine (PATANOL) 0.1 % ophthalmic solution; Place 1 drop into both eyes 2 times daily  -     cetirizine (ZYRTEC) 10 MG tablet; TAKE 1 TABLET (10 MG) BY MOUTH DAILY / TXHUA HNUB NOJ 1 LUB TSHUAJ PAB CORINE ALLERGY    Tension headache  -     aspirin-acetaminophen-caffeine (EXCEDRIN MIGRAINE) 250-250-65 MG tablet; Take 1-2 tablets by mouth every 6 hours as needed for headaches      At next visit, we need to discuss advanced directive, as we did not have time today.  In addition, I would like to discuss his low vitamin D, including supplementation, and his recurrent prednisone use for gout.  He may qualify for DEXA scanning, and would like to discuss this more closely with him.  In addition, the AHA calculator indicates that a statin is indicated, although he is no significant cardiovascular risk factors.  We will discuss this option with him going  forward as well.    Options for treatment and follow-up care were reviewed with the Cristina Wa Rafal and/or guardian engaged in the decision making process and verbalized understanding of the options discussed and agreed with the final plan.    Cintia Ortiz MD

## 2021-04-02 NOTE — NURSING NOTE
Due to patient being non-English speaking/uses sign language, an  was used for this visit. Only for face-to-face interpretation by an external agency, date and length of interpretation can be found on the scanned worksheet.       name: Paulette Stevie  Language: Rufina  Agency:  [employer]  Telephone number: [phone]  Type of interpretation:  Face-to-face, spoken

## 2021-04-02 NOTE — PATIENT INSTRUCTIONS
Referral for ENT  Referral for sleep study  New medicine for headache--excedrine Migraine.  Take 1-2 pills with onset of headache.    Head down to lab for blood draw.    Follow up in 1 month.    Eye drops for eyes.  Use 1x daily.          MEDICARE PERSONAL PREVENTIVE SERVICES PLAN - IMMUNIZATIONS     Here are your recommended immunizations.  Take this home for your reference.                                                    IMMUNIZATIONS Description Recommend today?     Influenza (Flu shot) Prevents flu; should get every year No; is up to date.   PCV 13 Pneumonia vaccination; you get it once No; is up to date.   PPSV 23 Second pneumonia vaccination; usually get it 1 year after PCV 13 No; is up to date.   Zoster (Shingles) Prevents shingles; you get it once  (Check with Part D insurance for coverage, must receive at a pharmacy, not clinic) No; is up to date.   Tetanus Prevents tetanus; once every 10 years No; is up to date.       MEDICARE PERSONAL PREVENTIVE SERVICES PLAN - SERVICES     Review these tests with your doctor then decide which ones you want and take this page home for your reference                                                    IMMUNIZATIONS Description Recommend today?     Hepatitis B  If you have any of the following risk factors you should be immunized for hepatitis B: severe kidney disease, people who live in the same house as a carrier of Hepatitis B virus, people who live in  institutions (e.g. nursing homes or group homes), homosexual men, patients with hemophilia who received Factor VIII or IX concentrates, abusers of illicit injectable drugs Not indicated     SCREENING TESTS     Description   Year of Last Screening   Recommended Today?   Heart disease screening blood tests    Cholesterol level Reducing cholesterol can reduce your risk of heart attacks by 25%.  Screening is recommended yearly if you are at risk of heart disease otherwise every 4-5 years 3/2/2021    LDL 60 No; is up to  date.   Diabetes screening tests    Hemoglobin A1c blood test   Finding and treating diabetes early can reduce complications.  Screening recommended/covered yearly if you have high blood pressure, high cholesterol, obesity (BMI >30), or a history of high blood glucose tests; or 2 of the following: family history of diabetes, overweight (BMI >25 but <30), age 65 years or older, and a history of diabetes of pregnancy or gave birth to baby weighing more than 9 lbs. 2/26/2021    A1c 5.7 No; is up to date.   Hepatitis B screening Finding hepatitis B early can reduce complications.  Screening is recommended for persons with selected risk factors. Never Yes; Recommended and ordered.   Hepatitis C screening Finding hepatitis C early can reduce complications.  Screening is recommended for all persons born from 1945 through 1965 and for those with selected other risk factors.  1/22/2018    Negative No; is up to date.   HIV screening Finding HIV early can reduce complications.  Screening is recommended for persons with risk factors for HIV infection. Never Yes; Recommended and ordered.   Glaucoma screening Early detection of glaucoma can prevent blindness.   Please talk to your eye doctor about this.       SCREENING TESTS     Description   Year of Last Screening   Recommended Today?   Colorectal cancer screening    Fecal occult blood test     Screening colonoscopy Screening for colon cancer has been shown to reduce death from colon cancer by 25-30%. Screening recommended to start at 50 years and continuing until age 75 years.   1/28/2016    FIT test negative Yes; Recommended and ordered.   Breast Cancer Screening (women)    Mammogram Mammogram screening for breast cancer has been shown to reduce the risk of dying from breast cancer and prolong life. Screening is recommended every 1-2 years for women aged 50 to 74 years.  NA No: is not indicated today.   Cervical Cancer screening (women)    Pap Cervical pap smears can reduce  cervical cancer. Screening is recommended annually if high risk (history of abnormal pap smears) otherwise every 2-3 years, stop screening at 65 years of age if history of normal paps. NA No: is not indicated today.   Screening for Osteoporosis:  Bone mass measurements (women)    Dexa Scan Screening and treating Osteoporosis can reduce the risk of hip and spine fractures. Screening is recommended in women 65 years or older and in women and men at risk of osteoporosis. NA No: is not indicated today.   Screening for Lung Cancer     Low-dose CT scanning Screening can reduce mortality in persons aged 55-80 who have smoked at least 30 pack-years and who are either still smoking or have quit in the past 15 years. NA No: is not indicated today.   Abdominal Aortic Aneurysm (AAA) screening    Ultrasound ([place])   An aneurysm treated before rupture is very safe -a ruptured aneurysm can be fatal.  Screening  by [place] for AAA is limited to patients who meet one of the following criteria:    Men who are 65-75 years old and have smoked more than 100 cigarettes in their lifetime    Anyone with a family history of abdominal aortic aneurysm NA No: is not indicated today.             MEDICARE WELLNESS EXAM PATIENT INSTRUCTIONS    Yearly exam:     See your health care provider every year in order to review changes in your health, review medicines that you take, and discuss preventive care needs such as immunizations and cancer screening.    Get a flu shot each year.     Advance Directives:    If you have not done so, you are encouraged to complete advance directives and/or a living will.   More information about advance directives can be found at: http://www.[place]med.org/advocacy/Key-Issues/Advance-Directives    Nutrition:     Eat at least 5 servings of fruits and vegetables each day.     Eat whole-grain bread, whole-wheat pasta and brown rice instead of white grains and rice.     Talk to your doctor about Calcium and Vitamin D.      Lifestyle:    Exercise for at least 150 minutes a week (30 minutes a day, 5 days a week). This will help you control your weight and prevent disease.     Limit alcohol to one drink per day.     If you smoke, try to quit - your doctor will be happy to help.     Wear sunscreen to prevent skin cancer.     See your dentist every six months for an exam and cleaning.     See your eye doctor every 1 to 2 years to screen for conditions such as glaucoma, macular degeneration and cataracts.    04/06/21   OTOLARYNGOLOGY REFERRAL     [facility]  Phone: [phone]  Fax: [phone]    Referral, demographics and office note faxed to [phone]. They will contact patient to schedule.       SLEEP EVALUATION & MANAGEMENT REFERRAL  [facility]  Phone: [phone]  Fax: [phone]    Referral, demographics and medication list faxed to [phone] who will contact the patient to schedule.     Yasmin Baum

## 2021-04-02 NOTE — NURSING NOTE
Medicare Wellness Visit  Health Risk Assessment      Visual Acuity:  Right Eye: 10/16   Left Eye: 10/12.5  Both Eyes: 10/8        FALL RISK ASSESSMENT 4/2/2021 5/14/2019   Fallen 2 or more times in the past year? No No   Any fall with injury in the past year? No No            Health Risk Assessment / Review of Systems     Constitutional: Any fevers or night sweats? No     Eyes:  Vision problems   No     Hearing Do you feel you have hearing loss?   YES     Cardiovascular: Any chest pain, fast or irregular heart beat, calf pain with walking?     No           Respiratory:   Any breathing problems or cough?   No     Gastrointestinal: Any stomach or stool problems?   No      Genitourinary: Do you have difficulty controlling urination?   No     Muscles and Joints: Any joint stiffness or soreness?    YES     Skin: Any concerning lesions or moles?   No     Nervous System: Any loss of strength or feeling, numbness or tingling, shaking, dizziness, or headache?   YES     Mental Health: Any depression, anxiety or problems sleeping?     YES     Cognition: Do you have any problems with your memory?  No     PHQ-2 Score:   PHQ-2 ( 1999 [employer]) 4/2/2021 10/21/2020   Q1: Little interest or pleasure in doing things 0 0   Q2: Feeling down, depressed or hopeless 0 0   PHQ-2 Score 0 0       PHQ-9 Score:   [facility] Follow-up to PHQ 4/17/2018 12/12/2018 2/26/2021   PHQ-9 9. Suicide Ideation past 2 weeks Not at all Not at all Not at all            Medical Care     What other specialists or organizations are involved in your medical care?    Patient Care Team       Relationship Specialty Notifications Start End    Cintia Ortiz MD PCP - General   1/8/13     Phone: [phone] Fax: [phone]         [address] [place] [address]    Cintia Ortiz MD Assigned PCP   11/15/20     Phone: [phone] Fax: [phone]         [address] [place] [address]                 Social History / Home Safety     Social History  "    Tobacco Use     Smoking status: Never Smoker     Smokeless tobacco: Never Used   Substance Use Topics     Alcohol use: No     Marital Status:Partnered  Who lives in your household? Self and son    Does your home have any of the following safety concerns? Loose rugs in the hallway, no grab bars in the bathroom, no handrails on the stairs or have poorly lit areas?  No     Do you feel threatened or controlled by a partner, ex-partner or anyone in your life? No     Has anyone hurt you physically, for example by pushing, hitting, slapping or kicking you   or forcing you to have sex? No          Functional Status     Do you need help with dressing yourself, bathing, or walking?No     Do you need help with the phone, transportation, shopping, preparing meals, housework, laundry, medications or managing money?No       Risk Behaviors and Healthy Habits     History   Smoking Status     Never Smoker   Smokeless Tobacco     Never Used     How many servings of fruits and vegetables do you eat a day? 2-3    Exercise: 2-3 days/week for an average of less than 15 minutes walking      Do you frequently drive without a seatbelt? No     Do you use any other drugs? No         Do you use alcohol?No      Frailty Assessment            1. By yourself and note using aids, do you have difficulty walking up 10 steps without resting?  No  (1 for Yes, 0 for No)    2. By yourself and not using mobility aids, do you have any difficulty walking several hundred yards? No  (1 for Yes, 0 for No)    3. Have you lost 10 or more pounds unintentionally in the previous year? No  (If \"Yes\" and >5% weight loss, then score 1.  Score 0, if <5% weight loss or \"No\" weight loss)    4. How much of the times during the past 3 weeks did you feel tired? 3. Some of the time (\"1\" or \"2\" are scored 1, others 0)    5.  A doctor told the patient they had the following illnesses:  None    Lesly Garcia, CMA    "

## 2021-04-03 LAB — HBV SURFACE AG SERPL QL IA: NEGATIVE

## 2021-04-05 LAB
HBV CORE AB SERPL QL IA: POSITIVE
HBV SURFACE AB SER-ACNC: POSITIVE M[IU]/ML

## 2021-04-06 ENCOUNTER — AMBULATORY - HEALTHEAST (OUTPATIENT)
Dept: ADMINISTRATIVE | Facility: CLINIC | Age: 71
End: 2021-04-06

## 2021-04-06 DIAGNOSIS — H93.13 TINNITUS OF BOTH EARS: ICD-10-CM

## 2021-04-21 ENCOUNTER — DOCUMENTATION ONLY (OUTPATIENT)
Dept: FAMILY MEDICINE | Facility: CLINIC | Age: 71
End: 2021-04-21

## 2021-04-22 NOTE — PROGRESS NOTES
Telephone call to the client's home for annual health risk assessment.  An  was present.    Current situation/living environment  Lives with family    Activities of daily living (ADL)/instrumental activities of daily living (IADL) and functional issues  Client needs help with the following ADL's: none  Client needs help with the following IADL's: none      Health concerns for today  none  Has patient fallen 2 or more times in the last year? No  Has patient fallen with injury in the last year? No    Cognition/mental health  No concerns    STARS/Med Adherence  Client is non-compliant with the following quality measures: Colon cancer screening  Comments: education efforts    Client's Plan of Care consists of:  No formal or informal support needs.

## 2021-04-30 ENCOUNTER — TRANSFERRED RECORDS (OUTPATIENT)
Dept: HEALTH INFORMATION MANAGEMENT | Facility: CLINIC | Age: 71
End: 2021-04-30

## 2021-04-30 ENCOUNTER — RECORDS - HEALTHEAST (OUTPATIENT)
Dept: ADMINISTRATIVE | Facility: OTHER | Age: 71
End: 2021-04-30

## 2021-04-30 ENCOUNTER — OFFICE VISIT - HEALTHEAST (OUTPATIENT)
Dept: AUDIOLOGY | Facility: CLINIC | Age: 71
End: 2021-04-30

## 2021-04-30 ENCOUNTER — OFFICE VISIT - HEALTHEAST (OUTPATIENT)
Dept: OTOLARYNGOLOGY | Facility: CLINIC | Age: 71
End: 2021-04-30

## 2021-04-30 DIAGNOSIS — H93.13 TINNITUS OF BOTH EARS: ICD-10-CM

## 2021-04-30 DIAGNOSIS — H90.3 SENSORINEURAL HEARING LOSS, BILATERAL: ICD-10-CM

## 2021-04-30 DIAGNOSIS — H90.3 SENSORINEURAL HEARING LOSS (SNHL) OF BOTH EARS: ICD-10-CM

## 2021-04-30 NOTE — RESULT ENCOUNTER NOTE
Lesly-    Can you call this patient and get him in for follow up so we can check these levels again and make sure he is feeling better?

## 2021-05-24 ENCOUNTER — RECORDS - HEALTHEAST (OUTPATIENT)
Dept: ADMINISTRATIVE | Facility: CLINIC | Age: 71
End: 2021-05-24

## 2021-05-27 ENCOUNTER — RECORDS - HEALTHEAST (OUTPATIENT)
Dept: ADMINISTRATIVE | Facility: CLINIC | Age: 71
End: 2021-05-27

## 2021-05-27 ENCOUNTER — OFFICE VISIT - HEALTHEAST (OUTPATIENT)
Dept: AUDIOLOGY | Facility: CLINIC | Age: 71
End: 2021-05-27

## 2021-05-27 DIAGNOSIS — H90.3 SENSORINEURAL HEARING LOSS, BILATERAL: ICD-10-CM

## 2021-05-27 NOTE — PROGRESS NOTES
"[facility] Daily Progress     Patient Name: Cristina Lyles  Date: 3/27/2019  Visit #: 2  PTA visit #:  -  Referral Diagnosis: chronic right shoulder pain  Referring provider: Dr. Ortiz  Visit Diagnosis:     ICD-10-CM    1. Chronic right shoulder pain M25.511     G89.29    2. Poor posture R29.3        Past Medical History:   Diagnosis Date     Chronic gastric ulcer      Constipation      Gout      Headache 2016         Assessment:   Patient comes in for first f/u appt. He feels the same.    HEP/POC compliance is  fair .  Patient demonstrates understanding/independence with home program.  Patient is appropriate to continue with skilled physical therapy intervention, as indicated by initial plan of care.    Goal Status:  Pt. will demonstrate/verbalize independence in self-management of condition in : 12 weeks  Pt. will be independent with home exercise program in : 12 weeks  Pt. will have improved quality of sleep: with less pain;waking less times/night;in 6 weeks    Pt will: have increased R shoulder IR to L1 in order to assist in reaching up his back wtihin 8 weeks.  Pt will: be able to reach with less pain and difficulty within 8 weeks in order to improve his QOL.      Plan / Patient Education:     Continue with initial plan of care.  Progress with home program as tolerated.    Exercises:  Exercise #1: pendulum x20 ea direction 5x/day  Comment #1: scap retraction x10 with 5\" holds  Exercise #2: towel IR stretch 30 holds    Plan for next visit: pulleys, review HEP. Shoulder ext, pec stretch    Subjective:     Pain Ratin-6/10    Pain is the same. Not icing. Doing his HEP 2x/day      Objective:     R shoulder flexion AROM on pulley's: 160 degrees  Patient very aggressive with his HEP and pulley's. VC to slow down, not be as aggressive with exercises.  No change in pain after MT today      Treatment Today     TREATMENT MINUTES COMMENTS   Evaluation     Self-care/ Home management     Manual therapy 8 " In supine: Gr I-II R GH JM of distraction, PA's and inferior glides.    Neuromuscular Re-education     Therapeutic Activity     Therapeutic Exercises 14 Discussed progress. Objective measures taken. Reviewed HEP- see flow sheet for details. Bita's 5' flex and scaption. Instructed patient again today to ice and to perform his pendulums 5x/day   Gait training     Modality__________________                Total 22    Blank areas are intentional and mean the treatment did not include these items.       Jarett Bains, PT, DPT  3/27/2019

## 2021-05-27 NOTE — PROGRESS NOTES
"[facility] Daily Progress/discharge summary    Patient Name: Cristina Lyles  Date: 4/15/2019  Visit #: 5  PTA visit #: 1  Referral Diagnosis: chronic right shoulder pain  Referring provider: Dr. Ortiz  Visit Diagnosis:     ICD-10-CM    1. Chronic right shoulder pain M25.511     G89.29    2. Poor posture R29.3        Past Medical History:   Diagnosis Date     Chronic gastric ulcer      Constipation      Gout      Headache 9/28/2016         Assessment:   Patient comes in today and feels that he's made 70% progress since beginning therapy, although last session he didn't feel that he made any progress. Patient would like to discharge today and continue on his own. He demonstrates significant improvements in his shoulder ROM.    Items that patient has not been compliant with: avoiding heavy lifting/strenuous shoulder activities, sleeping on R use, using ice daily on shoulder, doing his HEP daily    HEP/POC compliance is  poor.    Goal Status (progressing toward):  Pt. will demonstrate/verbalize independence in self-management of condition in : 12 weeks;Met  Pt. will be independent with home exercise program in : 12 weeks;Met  Pt. will have improved quality of sleep: with less pain;waking less times/night;in 6 weeks;Met    Pt will: have increased R shoulder IR to L1 in order to assist in reaching up his back wtihin 8 weeks. (GOAL MET)  Pt will: be able to reach with less pain and difficulty within 8 weeks in order to improve his QOL. (GOAL MET)      Plan / Patient Education:     Continue with initial plan of care.  Progress with home program as tolerated.    Exercises:  Exercise #1: pendulum x20 ea direction 5x/day  Comment #1: scap retraction x10 with 5\" holds  Exercise #2: towel IR stretch 30 holds (HOLD)  Comment #2: revised to either AROM IR or posterior capsular stretch  Exercise #3: IR isometrics (HOLD)  Comment #3: 5\" x10  Exercise #4: doorway pec stretch  Comment #4: 30\" R  Exercise #5: B shoulder ext " "w/ L2 and rows w/ L3  Comment #5: x10    Change ext to bilateral. Rows B. Change pec stretch to B. Set up more appts.       Subjective:     Pain Ratin-2/10    Feels 70% better since beginning therapy. Ready to discharge from therapy.    : Trudy Witt      Objective:     R shoulder AROM:  Flexion- 175, min pain in shoulder  abd- 180, min pain in shoulder  IR- L1- \"tightness\"  ER-  symmetrical to L    No difficulty with austin's today  Max A needed with all exercises         Treatment Today     TREATMENT MINUTES COMMENTS   Evaluation     Self-care/ Home management     Manual therapy     Neuromuscular Re-education     Therapeutic Activity     Therapeutic Exercises 25 UBE 5' L2.0 (2.5' forward/backward). Discussed progress and goals. Objective measures taken. progress HEP-see flow sheet for details.   Gait training     Modality__________________                Total 25    Blank areas are intentional and mean the treatment did not include these items.       Jarett Phillips, PT, DPT  4/15/2019    "

## 2021-05-27 NOTE — PROGRESS NOTES
"[facility] Daily Progress     Patient Name: Cristina Lyles  Date: 4/3/2019  Visit #: 3  PTA visit #: 1  Referral Diagnosis: chronic right shoulder pain  Referring provider: Dr. Ortiz  Visit Diagnosis:     ICD-10-CM    1. Chronic right shoulder pain M25.511     G89.29    2. Poor posture R29.3        Past Medical History:   Diagnosis Date     Chronic gastric ulcer      Constipation      Gout      Headache 9/28/2016         Assessment:   Subjective report of not much change although he does states reaching forward is better.      HEP/POC compliance is  fair .  Patient demonstrates understanding/independence with home program.  Patient is appropriate to continue with skilled physical therapy intervention, as indicated by initial plan of care.    Goal Status:  Pt. will demonstrate/verbalize independence in self-management of condition in : 12 weeks  Pt. will be independent with home exercise program in : 12 weeks  Pt. will have improved quality of sleep: with less pain;waking less times/night;in 6 weeks    Pt will: have increased R shoulder IR to L1 in order to assist in reaching up his back wtihin 8 weeks.  Pt will: be able to reach with less pain and difficulty within 8 weeks in order to improve his QOL.      Plan / Patient Education:     Continue with initial plan of care.  Progress with home program as tolerated.    Exercises:  Exercise #1: pendulum x20 ea direction 5x/day  Comment #1: scap retraction x10 with 5\" holds  Exercise #2: towel IR stretch 30 holds  Comment #2: revised to either AROM IR or posterior capsular stretch  Exercise #3: IR isometrics  Comment #3: 5\" x10  Exercise #4: Unilateral pec stretch  Comment #4: 30\" R  Exercise #5: extension R shoulder L1 band   Comment #5: x10    Plan for next visit: kumar, review HEP.   Pt to hold on the towel IR stretch for now, will revisit this stretch in the future.    Subjective:   Pt notes he is feeling the same. No change since the last visit.  Pt " reports that reaching forward seems to have gotten better.  Pt has been compliant with his HEP and using ice.  Pt notes that the towel IR stretch is bothering him.  Pain Ratin/10          Objective:     R shoulder flexion AROM on pulley's: 160 degrees        Treatment Today     TREATMENT MINUTES COMMENTS   Evaluation     Self-care/ Home management     Manual therapy     Neuromuscular Re-education     Therapeutic Activity     Therapeutic Exercises 30 Pulleys for flexion and scaption x5'  See flow sheet  -posterior capsular stretch  -AROM with gentle stretch IR  -iometrics: ER  -shoulder ext L1 B R   Gait training     Modality__________________                Total 30    Blank areas are intentional and mean the treatment did not include these items.       Tg Mendez PTA,CLT  4/3/2019

## 2021-05-27 NOTE — PROGRESS NOTES
[facility] Daily Progress     Patient Name: Cristina Lyles  Date: 4/8/2019  Visit #: 4  PTA visit #: 1  Referral Diagnosis: chronic right shoulder pain  Referring provider: Dr. Ortiz  Visit Diagnosis:     ICD-10-CM    1. Chronic right shoulder pain M25.511     G89.29    2. Poor posture R29.3        Past Medical History:   Diagnosis Date     Chronic gastric ulcer      Constipation      Gout      Headache 9/28/2016         Assessment:   Patient doesn't feel that his pain has changed at all. However, patient demonstrates significant improvements with his shoulder ROM. He agrees that his shoulder motion has improved. Patient has been trying to lift heavy things, which is also likely inhibiting his progress. Recommend patient stop lifting heavy things to improve his progress. He is still also trying to sleep on his right side, which is against therapy recommendations/advice. Patient required MAXIMUM assistance with his HEP today. Educated patient on the importance of compliance with therapy HEP and recommendations.     HEP/POC compliance is  poor.  Patient demonstrates understanding/independence with home program.  Patient is appropriate to continue with skilled physical therapy intervention, as indicated by initial plan of care.    Goal Status (progressing toward):  Pt. will demonstrate/verbalize independence in self-management of condition in : 12 weeks  Pt. will be independent with home exercise program in : 12 weeks  Pt. will have improved quality of sleep: with less pain;waking less times/night;in 6 weeks    Pt will: have increased R shoulder IR to L1 in order to assist in reaching up his back wtihin 8 weeks.  Pt will: be able to reach with less pain and difficulty within 8 weeks in order to improve his QOL.      Plan / Patient Education:     Continue with initial plan of care.  Progress with home program as tolerated.    Exercises:  Exercise #1: pendulum x20 ea direction 5x/day  Comment #1: scap  "retraction x10 with 5\" holds  Exercise #2: towel IR stretch 30 holds (HOLD)  Comment #2: revised to either AROM IR or posterior capsular stretch  Exercise #3: IR isometrics  Comment #3: 5\" x10  Exercise #4: Unilateral pec stretch  Comment #4: 30\" R  Exercise #5: extension R shoulder L1 band   Comment #5: x10    Plan for next visit: pulleys, review HEP-have patient bring in paperwork. Change ext to bilateral. Rows B. Change pec stretch to B. Set up more appts.       Subjective:     Pain Ratin-6/10    No change in pain. Feels the same. Doing his exercises 2x/day. Doesn't feel ice is helping and doesn't like using it. Trying to lift heavy things and perform heavy duties at home. Trying to sleep on R shoulder.      Objective:     R shoulder AROM:  Flexion- 175, min pain in shoulder  abd- 170, min pain in shoulder  IR- L3- limited by pain  ER- T2, \"much better\"    No difficulty with austin's today  Max A needed with all exercises         Treatment Today     TREATMENT MINUTES COMMENTS   Evaluation     Self-care/ Home management     Manual therapy 8 In supine: Gr I-II R GH JM of distraction, PA's and inferior glides.   Neuromuscular Re-education     Therapeutic Activity     Therapeutic Exercises 27 Pulleys for flexion and scaption x5'.  Reviewed HEP-see flow sheet for details. Discussed importance of compliant with PT HEP and recommendations   Gait training     Modality__________________                Total 35    Blank areas are intentional and mean the treatment did not include these items.       Jarett Phillips, PT, DPT  2019    "

## 2021-05-29 ENCOUNTER — RECORDS - HEALTHEAST (OUTPATIENT)
Dept: ADMINISTRATIVE | Facility: CLINIC | Age: 71
End: 2021-05-29

## 2021-05-31 ENCOUNTER — RECORDS - HEALTHEAST (OUTPATIENT)
Dept: ADMINISTRATIVE | Facility: CLINIC | Age: 71
End: 2021-05-31

## 2021-06-17 ENCOUNTER — OFFICE VISIT - HEALTHEAST (OUTPATIENT)
Dept: AUDIOLOGY | Facility: CLINIC | Age: 71
End: 2021-06-17

## 2021-06-17 DIAGNOSIS — H90.3 SENSORINEURAL HEARING LOSS, BILATERAL: ICD-10-CM

## 2021-06-17 NOTE — PROGRESS NOTES
HPI: This patient is a 71yo M who presents for evaluation of hearing at the request of Dr. Ortiz. There has been a gradual loss of hearing over the past few years in both ears. Denies otalgia, otorrhea, vertigo, and other major medical issues. Some industrial noise exposure and has no relevant family history. There is bilateral, non-pulsatile tinnitus, most noticeable when it is quiet.    Past medical history, surgical history, social history, family history, medications, and allergies have been reviewed with the patient and are documented above.    Review of Systems: a 10-system review was performed. Pertinent positives are noted in the HPI and on a separate scanned document in the chart.    PHYSICAL EXAMINATION:  GEN: no acute distress, normocephalic  EYES: extraocular movements are intact, pupils are equal and round. Sclera clear.   EARS: auricles are normally formed. The external auditory canals are clear with minimal to no cerumen. Tympanic membranes are intact bilaterally with no signs of infection, effusion, retractions, or perforations.  NOSE: anterior nares are patent.   OC/OP: clear, dentition is in good repair. The tongue and palate are fully mobile and symmetric. No masses or lesions.  NECK: soft and supple. No lymphadenopathy or masses. Airway is midline.  NEURO: CN VII and XII symmetric. alert and oriented. No spontaneous nystagmus. Gait is normal.  PULM: breathing comfortably on room air, normal chest expansion with respiration  CARDS: no cyanosis or clubbing, normal carotid pulses    AUDIOGRAM: normal sloping to severe SNHL bilaterally with Type A tymps.    MEDICAL DECISION-MAKING: This patient is a 71yo M with sensorineural hearing loss and resultant tinnitus. Discussed hearing protection and masking techniques. Medically clear for hearing aids should the patient desire them. HAE arranged for him.

## 2021-06-17 NOTE — PATIENT INSTRUCTIONS - HE
Patient Instructions by Jarett Phillips PT at 4/15/2019 12:00 PM     Author: Jarett Phillips PT Service: -- Author Type: Physical Therapist    Filed: 4/15/2019 12:27 PM Encounter Date: 4/15/2019 Status: Addendum    : Jarett Phillips PT (Physical Therapist)    Related Notes: Original Note by Jarett Phillips PT (Physical Therapist) filed at 4/15/2019 12:26 PM        ELASTIC BAND ROWS     Holding elastic band with both hands, draw back the band as you bend your elbows. Keep your elbows near the side of your body. Hold for 30 seconds. Do 15-20 reps on each side. Use blue band. Do 3 days/week    ELASTIC BAND EXTENSION BILATERAL SHOULDER    While holding an elastic band with both arms in front of you with your elbows straight, pull the band downwards and back towards your side. Hold for 30 seconds. Do 15-20 reps on each side. Use orange band. Do 3 days/week      DOORWAY STRETCH - LOW    While standing in a doorway, place your arm downward on the door frame and lean in until a stretch is felt along the front of your chest and/or shoulder. Your arm should be pointed downward towards the floor along the door frame. Hold for 30 seconds. Do 2-3 reps on each side. Perform 1-2x/day    NOTE: Your legs should control how much you stretch by bending or straightening your knee through the doorway.

## 2021-06-17 NOTE — PATIENT INSTRUCTIONS - HE
Patient Instructions by Jarett Phillips PT at 3/25/2019 10:30 AM     Author: Jarett Phillips PT Service: -- Author Type: Physical Therapist    Filed: 3/25/2019 11:21 AM Encounter Date: 3/25/2019 Status: Addendum    : Jarett Phillips PT (Physical Therapist)    Related Notes: Original Note by Jarett Phillips PT (Physical Therapist) filed at 3/25/2019 11:15 AM        PENDULUM SHOULDER CIRCLES    Shift your body weight in circles to allow your injured arm to swing in circles freely. Your injured arm should be fully relaxed. Do 20 reps in each direction. Perform 3-5x/day      SCAPULAR RETRACTIONS    Draw your shoulder blades back and down. Hold for 5 seconds. Do 10 reps at a time. Perform throughout the day for good posture.      TOWEL STRETCH    Gently pull up your affected arm behind your back with the assist of a towel. Hold for 30 seconds. Do 5 reps at a time. Perform 2-3x/day     It is recommended that you ice 2-3x/day for 20 minutes at a time. Remember to not apply ice directly on skin.    Be sure to work on having good posture.

## 2021-06-25 NOTE — PROGRESS NOTES
AUDIOLOGY REPORT    SUBJECTIVE: Cristina Lyles, 70 y.o.  year old male, was seen on 05/27/21 for a hearing aid evaluation. An  was present via phone. His hearing was assessed on 4/30/21 and results revealed normal hearing sloping to severe sensorineural hearing loss at 4 kHz rising to a moderately severe sensorineural hearing loss bilaterally. Medical clearance was provided by Dr. Cook.     OBJECTIVE: Manufacturers, styles, monaural vs binaural fittings, trial period, technology levels, and realistic expectations were discussed. Cristina would like hearing aids that could possibly help with his tinnitus. Questions related to tinnitus were answered and the relationship between tinnitus and hearing loss was reviewed. He would like to start with a behind the ear style as he feels like his tinnitus worsening with things are in his ear.     Hearing Aids  :ReSound  Model: LiNX Quattro 5 Mini Rechargeable  Style:-in-the-ear  Color: Beige (5)  : 2-MP  Domes: Medium/small Surefit Open   Ma Model:-HVFW    ASSESSMENT: Reviewed purchase information and warranty information with patient. The 45 day trial period was explained to patient. The patient was given a copy of the [place] Department of [employer] consumer brochure on purchasing hearing instruments. Patient risk factors have been provided to the patient in writing prior to the sale of the hearing aid per FDA regulation. The risk factors are also available in the User Instructional Booklet to be presented on the day of the hearing aid fitting. Hearing aids ordered (order number [id number]). Hearing aid evaluation completed.     PLAN: Cristina will return in 2-3 weeks for a hearing aid fitting. Please contact our clinic at [phone] with questions or concerns.    Pura Wheeler, [facility]  Clinical Audiologist  [place] #[id number]

## 2021-06-26 NOTE — PROGRESS NOTES
AUDIOLOGY REPORT    SUBJECTIVE: Cristina Lyles, 70 y.o.  year old male, was seen on 06/17/21 for the fitting of binaural LiNX Quattro 5 Mini Rechargeable -in-the-ear hearing aids. An  was present via telephone. His hearing was assessed on 4/30/21 and results revealed normal hearing sloping to severe sensorineural hearing loss at 4 kHz rising to a moderately severe sensorineural hearing loss bilaterally. Cristina also have bothersome bilateral tinnitus and is hopeful that the hearing aids will provide some reduction in his tinnitus. Medical clearance was provided by Dr. Cook.    OBJECTIVE:   Hearing Aids  : ReSound  Model:  LiNX Quattro 5 mini Rechargeable   Style: -in-the-ear  : 2-MP  Domes: Medium/small Surefit Open   SN: [id number](R); [id number] (L); [id number] ()  Warranty:6/28/24  Ma Model:-LSIN    Simulated real ear measures were performed using the Audioscan "Carmolex,"ifit probe-tube microphone system and the NAL-NL1 prescriptive targets. Gain was adjusted to obtain a closer match to prescriptive targets for soft, average, and loud inputs. Maximum output was measured and was within acceptable limits and patient tolerance.    Speech Intelligibility Index (SII)  The speech intelligibility index (SII) estimates the amount of audible speech at different presentation levels through the hearing aids. The following SII values represent audibility for average inputs:  Unaided SII:52(R); 52(L)  Aided SII:72(R); 69(L)    The push button is set as an on/off function only. SoundRecover is disabled. Gain was set to 100%. Patient reported that the left seemed louder than the right and overall gain for the left ear was decreased by 4 dB until the patient reported it felt more balanced. Following these changes, Cristina reports satisfaction with the fit and sound quality of the instruments.  Use, care, trial period and realistic expectations were reviewed in detail.  Cristina is able to  demonstrate independent manipulation of the instruments with battery care and insertion/removal.     ASSESSMENT: Hearing aids fit. Purchase agreement signed.    PLAN: Cristina will return in 2-3 weeks for hearing aid follow up. Please contact our clinic at [phone] with questions or concerns.    Pura Wheeler, [facility]  Clinical Audiologist  [place] #[id number]

## 2021-06-27 NOTE — PROGRESS NOTES
Progress Notes by Jarett Phillips PT at 3/25/2019 10:30 AM     Author: Jarett Phillips PT Service: -- Author Type: Physical Therapist    Filed: 3/25/2019 12:19 PM Encounter Date: 3/25/2019 Status: Attested    : Jarett Phillips PT (Physical Therapist) Cosigner: Cintia Ortiz MD at 3/26/2019  6:35 AM    Attestation signed by Cintia Ortiz MD at 3/26/2019  6:35 AM    3/26/2019  I have reviewed the note.  I agree with the findings, assessment and plan.    Cintia Ortiz MD                              [facility] Certification Request    March 25, 2019      Patient: Cristina Lyles  MR Number: [id number]  YOB: 1950  Date of Visit: 3/25/2019      Dear  Chronic right shoulder pain,    Thank you for this referral.   We are seeing Cristina Lyles for Physical Therapy of chronic right shoulder pain,    Medicare and/or Medicaid requires physician review and approval of the treatment plan. Please review the plan of care and verify that you agree with the therapy plan of care by co-signing this note.      Plan of Care  Authorization / Certification Start Date: 03/25/19  Authorization / Certification End Date: 06/25/19  Authorization / Certification Number of Visits: 12  Communication with: Referral Source  Patient Related Instruction: Nature of Condition;Treatment plan and rationale;Next steps;Expected outcome;Self Care instruction;Basis of treatment;Body mechanics;Posture;Precautions  Times per Week: 1-2  Number of Weeks: 12-14  Number of Visits: 12  Discharge Planning: when PT goals are met  Therapeutic Exercise: ROM;Stretching;Strengthening  Neuromuscular Reeducation: core;posture  Manual Therapy: soft tissue mobilization;myofascial release;joint mobilization;muscle energy  Equipment: theraband      Goals:  Pt. will demonstrate/verbalize independence in self-management of condition in : 12 weeks  Pt. will be independent with home exercise program in : 12 weeks  Pt. will have improved  quality of sleep: with less pain;waking less times/night;in 6 weeks    Pt will: have increased R shoulder IR to L1 in order to assist in reaching up his back wtihin 8 weeks.  Pt will: be able to reach with less pain and difficulty within 8 weeks in order to improve his QOL.        If you have any questions or concerns, please don't hesitate to call.    Sincerely,      Jarett Phillips, PT, DPT        Physician recommendation:     ___ Follow therapist's recommendation        ___ Modify therapy      *Physician co-signature indicates they certify the need for these services furnished within this plan and while under their care.      [facility]   Shoulder Initial Evaluation    Patient Name: Cristina Lyles  Date of evaluation: 3/25/2019  Referral Diagnosis: Chronic right shoulder pain  Referring provider: Cintia Ortiz MD  Visit Diagnosis:     ICD-10-CM    1. Chronic right shoulder pain M25.511     G89.29    2. Poor posture R29.3        Assessment:      Cristina Lyles is a 68 y.o. male who presents to therapy today with chief complaints of chronic right shoulder pain. Symptoms began May 2018. Difficulty with sleeping, changing sleeping positions, transfers, reaching, lifting arm due to pain and weakness.  Pain symptoms are improving since beginning new [facility]. He stated that he had a shoulder injection but that didn't seem to help.  Patient demonstrates signs and sx consistent with right RC tendonosis, impingement and poor posture. PT POC and goals have been discussed with patient and He  is agreeable to these. Patient appears motivated for physical therapy and is appropriate for skilled therapy services.    The POC is dynamic and will be modified on an ongoing basis.  Barriers to achieving goals as noted in the assessment section may affect outcome.  Prognosis to achieve goals is  fair   Pt. is appropriate for skilled PT intervention as outlined in the Plan of Care (POC).  Pt. is a good candidate for skilled  "PT services to improve pain levels and function.    Goals:  Pt. will demonstrate/verbalize independence in self-management of condition in : 12 weeks  Pt. will be independent with home exercise program in : 12 weeks  Pt. will have improved quality of sleep: with less pain;waking less times/night;in 6 weeks    Pt will: have increased R shoulder IR to L1 in order to assist in reaching up his back wtihin 8 weeks.  Pt will: be able to reach with less pain and difficulty within 8 weeks in order to improve his QOL.      Patient's expectations/goals are realistic.    Barriers to Learning or Achieving Goals:  Chronicity of the problem.  Co-morbidities or other medical factors.  see [facility]       Plan / Patient Instructions:        Plan of Care:   Authorization / Certification Start Date: 03/25/19  Authorization / Certification End Date: 06/25/19  Authorization / Certification Number of Visits: 12  Communication with: Referral Source  Patient Related Instruction: Nature of Condition;Treatment plan and rationale;Next steps;Expected outcome;Self Care instruction;Basis of treatment;Body mechanics;Posture;Precautions  Times per Week: 1-2  Number of Weeks: 12-14  Number of Visits: 12  Discharge Planning: when PT goals are met  Therapeutic Exercise: ROM;Stretching;Strengthening  Neuromuscular Reeducation: core;posture  Manual Therapy: soft tissue mobilization;myofascial release;joint mobilization;muscle energy  Equipment: theraband      Exercises:  Exercise #1: pendulum x20 ea direction  Comment #1: scap retraction x10 with 5\" holds  Exercise #2: towel IR stretch 30 holds      POC and pathology of condition were reviewed with patient.  Pt. is in agreement with the Plan of Care  A Home Exercise Program (HEP) was initiated today.  Pt. was instructed in exercises by PT and patient was given a handout with detailed instructions.    Plan for next visit: kumar, review HEP. Shoulder ext, pec stretch  .   Subjective:       Social " information:   Occupation: unemployed   Hobbies: watch TV, walk around the house   Does drive       History of Present Illness:    Cristina is a 68 y.o. male who presents to therapy today with complaints of chronic right shoulder pain. Date of onset/duration of symptoms is May 2018 without injury. Symptoms are constant and getting better. He denies history of similar symptoms. He describes their previous level of function as not limited. Medication seems to be helping. Was icing but only twice. His shoulder turned red so he stopped. He did see [facility] for his shoulder. Pain did have an injection but didn't seem to have any relief from it. Sharp,deep  pain seems to have lessened.    Had an x-ray and MRI. States it showed a twisted and torn muscle.    Pain Ratin  Pain rating at best: 5  Pain rating at worst: 10  Pain description: weakness, pain    Functional limitations are described as occurring with: sleeping, changing sleeping positions, transfers, reaching, lifting arm       Objective:      Note: Items left blank indicates the item was not performed or not indicated at the time of the evaluation.    Patient Outcome Measures :    No Data Recorded   Scores range from 0-100%, where a score of 0% represents minimal pain and maximal function. The minimal clincically important difference is a score reduction of 10%.    Patient 14' late     Shoulder Examination  1. Chronic right shoulder pain     2. Poor posture       Involved side: Right  Posture Observation: poor. Protracted head and shoulders  Cervical Clearing: L cervical SB and rotation increase in R anterior shoulder    Sensation: some numbness superior R arm sometimes.    Shoulder/Elbow ROM    Date: 3/25/2019     Shoulder and Elbow ROM ( )   AROM in degrees AROM in degrees AROM in degrees    Right Left Right Left Right Left   Shoulder Flexion (0-180 ) 170- limited by pain        Shoulder Abduction (0-180 ) 160- limited by pain        Shoulder Extension  (0-60 )         Shoulder ER (0-90 ) C5- limited by pain in lateral R deltoid        Shoulder IR (0-70 ) L5 level but to the right of the lower back, limited by pain        Shoulder IR/Ext         Elbow Flexion (150 )         Elbow Extension (0 )          PROM in degrees PROM in degrees PROM in degrees    Right Left Right Left Right Left   Shoulder Flexion (0-180 )         Shoulder Abduction (0-180 )         Shoulder Extension (0-60 )         Shoulder ER (0-90 )         Shoulder IR (0-70 )         Elbow Flexion (150 )         Elbow Extension (0 )           Shoulder/Elbow Strength 4/5 B. Pain increased with all R UE strength testing  Date: 3/25/2019     Shoulder/Elbow Strength (/5)  Manual Muscle Test (MMT) MMT MMT MMT    Right Left Right Left Right Left   Shoulder Flexion         Supraspinatus         Shoulder Abduction         Shoulder Extension         Shoulder External Rotation         Shoulder Internal Rotation         Elbow Flexion         Elbow Extension         Other:         Other:           Palpation:  Tender R IS, R bicipital groove, lateral R deltoid    Shoulder Special Tests     Impingement Cluster Right (+/-) Left (+/-) Rotator Cuff Tests Right (+/-) Left (+/-)   Funez-Cristóbal +  Drop Arm Sign -    Painful Arc +  Hornblowers     Infraspinatus Test +  ERLS     AC Tests Right (+/-) Left (+/-) IRLS     Active Compression   Labral Tests Right (+/-) Left (+/-)   Crossbody Adduction -  Biceps Load Test II     AC Resisted Extension   Jerk Test     GH Instability Tests Right (+/-) Left (+/-) Courtney Test     Sulcus Sign   Biceps Tests Right (+/-) Left (+/-)   Apprehension   Speed -    Relocation   Carmel     Other:   Other: nebrandee's -    Other:   Other:         Treatment Today    TREATMENT MINUTES COMMENTS   Evaluation 20 -shoulder pain   Self-care/ Home management     Manual therapy     Neuromuscular Re-education     Therapeutic Activity     Therapeutic Exercises 23 Discussed PT POC and pathology of condition.  Answered patient questions. Began HEP-see flowsheet. Recommend patient heat/ice as needed.   Gait training     Modality__________________                Total 43    Blank areas are intentional and mean the treatment did not include these items.     PT Evaluation Code: (Please list factors)  Patient History/Comorbidities: PMH  Examination: chronic R shoulder pain  Clinical Presentation: stable  Clinical Decision Making: low    Patient History/  Comorbidities Examination  (body structures and functions, activity limitations, and/or participation restrictions) Clinical Presentation Clinical Decision Making (Complexity)   No documented Comorbidities or personal factors 1-2 Elements Stable and/or uncomplicated Low   1-2 documented comorbidities or personal factor 3 Elements Evolving clinical presentation with changing characteristics Moderate   3-4 documented comorbidities or personal factors 4 or more Unstable and unpredictable High                Jarett Phillips, PT, DPT  3/25/2019  10:40 AM

## 2021-06-30 NOTE — PROGRESS NOTES
"Progress Notes by Nicky Reyes AuD at 4/30/2021  8:00 AM     Author: Nicky Reyes AuD Service: -- Author Type: Audiologist    Filed: 4/30/2021 12:58 PM Encounter Date: 4/30/2021 Status: Signed    : Nicky Reyes AuD (Audiologist)       Audiology Report    Summary: Audiology visit completed. Please see audiogram below or in \"media\" tab for case history and results.     Plan:  The patient is returned to ENT for follow up. Return for retesting with ENT recommendation.     Pura Wheeler, [facility]  Clinical Audiologist  [place] #[id number]           "

## 2021-07-20 DIAGNOSIS — L29.9 ITCHING: ICD-10-CM

## 2021-07-20 DIAGNOSIS — G89.29 CHRONIC RIGHT SHOULDER PAIN: ICD-10-CM

## 2021-07-20 DIAGNOSIS — M25.511 CHRONIC RIGHT SHOULDER PAIN: ICD-10-CM

## 2021-07-20 DIAGNOSIS — J30.2 SEASONAL ALLERGIC RHINITIS, UNSPECIFIED TRIGGER: ICD-10-CM

## 2021-07-20 RX ORDER — CETIRIZINE HYDROCHLORIDE 10 MG/1
TABLET ORAL
Qty: 30 TABLET | Refills: 1 | Status: SHIPPED | OUTPATIENT
Start: 2021-07-20 | End: 2022-01-19

## 2021-07-20 NOTE — TELEPHONE ENCOUNTER
Refilled cetirizine and naproxen.    Creatinine   Date Value Ref Range Status   03/02/2021 1.0 0.7 - 1.3 mg/dL Final     Hemoglobin   Date Value Ref Range Status   04/02/2021 17.8 (H) 13.3 - 17.7 g/dL Final     Tomeka Melvin MD  (covering for Dr. Ortiz while out of office)

## 2022-01-14 DIAGNOSIS — M1A.0710 CHRONIC GOUT OF RIGHT FOOT, UNSPECIFIED CAUSE: Primary | ICD-10-CM

## 2022-01-14 DIAGNOSIS — R79.89 ABNORMAL TSH: ICD-10-CM

## 2022-01-14 DIAGNOSIS — R79.89 ELEVATED FERRITIN: ICD-10-CM

## 2022-01-14 DIAGNOSIS — N18.2 CKD (CHRONIC KIDNEY DISEASE) STAGE 2, GFR 60-89 ML/MIN: ICD-10-CM

## 2022-01-14 DIAGNOSIS — E55.9 VITAMIN D DEFICIENCY: ICD-10-CM

## 2022-01-19 ENCOUNTER — OFFICE VISIT (OUTPATIENT)
Dept: FAMILY MEDICINE | Facility: CLINIC | Age: 72
End: 2022-01-19
Payer: COMMERCIAL

## 2022-01-19 VITALS
SYSTOLIC BLOOD PRESSURE: 136 MMHG | HEART RATE: 80 BPM | OXYGEN SATURATION: 98 % | TEMPERATURE: 98.1 F | RESPIRATION RATE: 16 BRPM | DIASTOLIC BLOOD PRESSURE: 79 MMHG

## 2022-01-19 DIAGNOSIS — N18.2 CKD (CHRONIC KIDNEY DISEASE) STAGE 2, GFR 60-89 ML/MIN: ICD-10-CM

## 2022-01-19 DIAGNOSIS — L71.9 ACNE ROSACEA: ICD-10-CM

## 2022-01-19 DIAGNOSIS — Z23 NEED FOR PROPHYLACTIC VACCINATION AND INOCULATION AGAINST INFLUENZA: Primary | ICD-10-CM

## 2022-01-19 DIAGNOSIS — G44.209 TENSION HEADACHE: ICD-10-CM

## 2022-01-19 DIAGNOSIS — E55.9 VITAMIN D DEFICIENCY: ICD-10-CM

## 2022-01-19 DIAGNOSIS — H90.0 CONDUCTIVE HEARING LOSS, BILATERAL: ICD-10-CM

## 2022-01-19 DIAGNOSIS — M1A.0710 CHRONIC GOUT OF RIGHT FOOT, UNSPECIFIED CAUSE: ICD-10-CM

## 2022-01-19 DIAGNOSIS — L29.9 ITCHING: ICD-10-CM

## 2022-01-19 DIAGNOSIS — H65.22 LEFT CHRONIC SEROUS OTITIS MEDIA: ICD-10-CM

## 2022-01-19 DIAGNOSIS — R79.89 ELEVATED FERRITIN: ICD-10-CM

## 2022-01-19 DIAGNOSIS — J30.2 SEASONAL ALLERGIC RHINITIS, UNSPECIFIED TRIGGER: ICD-10-CM

## 2022-01-19 DIAGNOSIS — R79.89 ABNORMAL TSH: ICD-10-CM

## 2022-01-19 LAB
ALBUMIN SERPL-MCNC: 3.8 G/DL (ref 3.5–5)
ALP SERPL-CCNC: 73 U/L (ref 45–120)
ALT SERPL W P-5'-P-CCNC: 29 U/L (ref 0–45)
ANION GAP SERPL CALCULATED.3IONS-SCNC: 12 MMOL/L (ref 5–18)
AST SERPL W P-5'-P-CCNC: 25 U/L (ref 0–40)
BASOPHILS # BLD AUTO: 0 10E3/UL (ref 0–0.2)
BASOPHILS NFR BLD AUTO: 1 %
BILIRUB SERPL-MCNC: 0.6 MG/DL (ref 0–1)
BUN SERPL-MCNC: 17 MG/DL (ref 8–28)
CALCIUM SERPL-MCNC: 8.9 MG/DL (ref 8.5–10.5)
CHLORIDE BLD-SCNC: 106 MMOL/L (ref 98–107)
CO2 SERPL-SCNC: 23 MMOL/L (ref 22–31)
CREAT SERPL-MCNC: 1.26 MG/DL (ref 0.7–1.3)
EOSINOPHIL # BLD AUTO: 0.1 10E3/UL (ref 0–0.7)
EOSINOPHIL NFR BLD AUTO: 1 %
ERYTHROCYTE [DISTWIDTH] IN BLOOD BY AUTOMATED COUNT: 12.9 % (ref 10–15)
FERRITIN SERPL-MCNC: 1137 NG/ML (ref 27–300)
GFR SERPL CREATININE-BSD FRML MDRD: 61 ML/MIN/1.73M2
GLUCOSE BLD-MCNC: 108 MG/DL (ref 70–125)
HCT VFR BLD AUTO: 51.3 % (ref 40–53)
HGB BLD-MCNC: 16.8 G/DL (ref 13.3–17.7)
IMM GRANULOCYTES # BLD: 0 10E3/UL
IMM GRANULOCYTES NFR BLD: 0 %
LYMPHOCYTES # BLD AUTO: 2 10E3/UL (ref 0.8–5.3)
LYMPHOCYTES NFR BLD AUTO: 35 %
MCH RBC QN AUTO: 27.8 PG (ref 26.5–33)
MCHC RBC AUTO-ENTMCNC: 32.7 G/DL (ref 31.5–36.5)
MCV RBC AUTO: 85 FL (ref 78–100)
MONOCYTES # BLD AUTO: 0.5 10E3/UL (ref 0–1.3)
MONOCYTES NFR BLD AUTO: 8 %
NEUTROPHILS # BLD AUTO: 3.2 10E3/UL (ref 1.6–8.3)
NEUTROPHILS NFR BLD AUTO: 55 %
PLATELET # BLD AUTO: 142 10E3/UL (ref 150–450)
POTASSIUM BLD-SCNC: 4.1 MMOL/L (ref 3.5–5)
PROT SERPL-MCNC: 7.4 G/DL (ref 6–8)
RBC # BLD AUTO: 6.04 10E6/UL (ref 4.4–5.9)
SODIUM SERPL-SCNC: 141 MMOL/L (ref 136–145)
TSH SERPL DL<=0.005 MIU/L-ACNC: 1.99 UIU/ML (ref 0.3–5)
URATE SERPL-MCNC: 6.7 MG/DL (ref 3–8)
WBC # BLD AUTO: 5.7 10E3/UL (ref 4–11)

## 2022-01-19 PROCEDURE — 82306 VITAMIN D 25 HYDROXY: CPT | Performed by: STUDENT IN AN ORGANIZED HEALTH CARE EDUCATION/TRAINING PROGRAM

## 2022-01-19 PROCEDURE — 36415 COLL VENOUS BLD VENIPUNCTURE: CPT | Performed by: STUDENT IN AN ORGANIZED HEALTH CARE EDUCATION/TRAINING PROGRAM

## 2022-01-19 PROCEDURE — 90471 IMMUNIZATION ADMIN: CPT | Performed by: STUDENT IN AN ORGANIZED HEALTH CARE EDUCATION/TRAINING PROGRAM

## 2022-01-19 PROCEDURE — 84550 ASSAY OF BLOOD/URIC ACID: CPT | Performed by: STUDENT IN AN ORGANIZED HEALTH CARE EDUCATION/TRAINING PROGRAM

## 2022-01-19 PROCEDURE — 99214 OFFICE O/P EST MOD 30 MIN: CPT | Mod: 25 | Performed by: STUDENT IN AN ORGANIZED HEALTH CARE EDUCATION/TRAINING PROGRAM

## 2022-01-19 PROCEDURE — 80050 GENERAL HEALTH PANEL: CPT | Performed by: STUDENT IN AN ORGANIZED HEALTH CARE EDUCATION/TRAINING PROGRAM

## 2022-01-19 PROCEDURE — 82728 ASSAY OF FERRITIN: CPT | Performed by: STUDENT IN AN ORGANIZED HEALTH CARE EDUCATION/TRAINING PROGRAM

## 2022-01-19 PROCEDURE — 90662 IIV NO PRSV INCREASED AG IM: CPT | Performed by: STUDENT IN AN ORGANIZED HEALTH CARE EDUCATION/TRAINING PROGRAM

## 2022-01-19 RX ORDER — CETIRIZINE HYDROCHLORIDE 10 MG/1
TABLET ORAL
Qty: 30 TABLET | Refills: 5 | Status: SHIPPED | OUTPATIENT
Start: 2022-01-19 | End: 2022-03-16

## 2022-01-19 RX ORDER — FLUTICASONE PROPIONATE 50 MCG
1 SPRAY, SUSPENSION (ML) NASAL DAILY
Qty: 15.8 ML | Refills: 0 | Status: CANCELLED | OUTPATIENT
Start: 2022-01-19

## 2022-01-19 RX ORDER — COLCHICINE 0.6 MG/1
TABLET ORAL
Qty: 30 TABLET | Refills: 5 | Status: SHIPPED | OUTPATIENT
Start: 2022-01-19 | End: 2022-01-21

## 2022-01-19 RX ORDER — TRIAMCINOLONE ACETONIDE 1 MG/G
OINTMENT TOPICAL 2 TIMES DAILY
Qty: 15 G | Refills: 2 | Status: SHIPPED | OUTPATIENT
Start: 2022-01-19 | End: 2022-08-09

## 2022-01-19 RX ORDER — ALLOPURINOL 300 MG/1
TABLET ORAL
Qty: 30 TABLET | Refills: 5 | Status: SHIPPED | OUTPATIENT
Start: 2022-01-19 | End: 2022-01-21

## 2022-01-19 RX ORDER — OLOPATADINE HYDROCHLORIDE 1 MG/ML
1 SOLUTION/ DROPS OPHTHALMIC 2 TIMES DAILY
Qty: 5 ML | Refills: 1 | Status: CANCELLED | OUTPATIENT
Start: 2022-01-19

## 2022-01-19 ASSESSMENT — ANXIETY QUESTIONNAIRES
3. WORRYING TOO MUCH ABOUT DIFFERENT THINGS: NOT AT ALL
6. BECOMING EASILY ANNOYED OR IRRITABLE: NOT AT ALL
5. BEING SO RESTLESS THAT IT IS HARD TO SIT STILL: NOT AT ALL
1. FEELING NERVOUS, ANXIOUS, OR ON EDGE: NOT AT ALL
GAD7 TOTAL SCORE: 3
2. NOT BEING ABLE TO STOP OR CONTROL WORRYING: NOT AT ALL
IF YOU CHECKED OFF ANY PROBLEMS ON THIS QUESTIONNAIRE, HOW DIFFICULT HAVE THESE PROBLEMS MADE IT FOR YOU TO DO YOUR WORK, TAKE CARE OF THINGS AT HOME, OR GET ALONG WITH OTHER PEOPLE: SOMEWHAT DIFFICULT
7. FEELING AFRAID AS IF SOMETHING AWFUL MIGHT HAPPEN: MORE THAN HALF THE DAYS

## 2022-01-19 ASSESSMENT — PATIENT HEALTH QUESTIONNAIRE - PHQ9
5. POOR APPETITE OR OVEREATING: SEVERAL DAYS
SUM OF ALL RESPONSES TO PHQ QUESTIONS 1-9: 3

## 2022-01-19 NOTE — NURSING NOTE
Due to patient being non-English speaking/uses sign language, an  was used for this visit. Only for face-to-face interpretation by an external agency, date and length of interpretation can be found on the scanned worksheet.     name: BURTON   Agency: [employer]  Language: Rufina   Telephone number: [phone]  Type of interpretation: Telemedicine, spoken

## 2022-01-19 NOTE — PROGRESS NOTES
Nursing Notes:   Lesly Garcia, [facility]  1/19/2022  1:46 PM  Signed  Due to patient being non-English speaking/uses sign language, an  was used for this visit. Only for face-to-face interpretation by an external agency, date and length of interpretation can be found on the scanned worksheet.     name: CRISTALKERI   Agency: [employer]  Language: ong   Telephone number: [phone]  Type of interpretation: Telemedicine, spoken        ASSESSMENT AND PLAN:      Cristina was seen today for refill request and derm problem.  Here for review of medications, and refills.    Diagnoses and all orders for this visit:    Chronic gout of right foot, unspecified cause.  He reports that his gout is well controlled.  Taking colchicine and allopurinol daily 1 pill of each.  Refills provided on these.  We will check uric acid level to ensure appropriate dosing.  Continue with dietary and lifestyle modifications.  -     Uric acid  -     colchicine (COLCYRS) 0.6 MG tablet; TAKE 1 TABLET (0.6 MG) BY MOUTH DAILY/ TXHUA HNUB NOJ 1 LUB TSHUAJ GUILLERMO JUAN TAW VWM *NEEDS APPOINTMENT FOR FURTHER REFILLS*  -     allopurinol (ZYLOPRIM) 300 MG tablet; TAKE 1 TABLET (300 MG) BY MOUTH DAILY/ TXHUA HNUB NOJ 1 LUB TSHUAJ PAB VERONICA KO TAW VWM *NEEDS APPOINTMENT FOR FURTHER REFILLS*    Elevated ferritin.  We will recheck levels as this was likely elevated in the past secondary to recent COVID vaccination.  -     CBC with Platelets & Differential  -     Ferritin  -     Comprehensive metabolic panel    CKD (chronic kidney disease) stage 2, GFR 60-89 ml/min.  Mild CKD.  We will recheck levels.  -     Comprehensive metabolic panel    Vitamin D deficiency.  Previous vitamin D deficiency.  Recheck levels and supplement if needed.  -     Vitamin D Deficiency    Abnormal TSH.  History of abnormal TSH with normal T4.  We will recheck.  -     TSH with free T4 reflex    Decreased hearing, tinnitus, and left chronic serous otitis media.  He  received new hearing aids for this.  They are sometimes bothersome but he does wear them at times and finds them helpful.    Itching  Seasonal allergic rhinitis, unspecified trigger  Acne rosacea  Refilled current cream and medication for itching.  -     cetirizine (ZYRTEC) 10 MG tablet; Take 1 pill daily as needed for itching  -     triamcinolone (KENALOG) 0.1 % external ointment; Apply topically 2 times daily        Patient Instructions   Flu shots today.  Refill medications.   Follow up in 6 month.        Cintia Ortiz MD    KASHIF Lyles is a 71 year old male with past medical history significant for    Patient Active Problem List   Diagnosis     Anxiety state     Constipation     Depressive disorder, not elsewhere classified     Esophageal reflux     Major depressive disorder, recurrent episode (H)     Health Care Home     Gout     Rosacea     Others present at the visit:  Maria Dolores present via telephone    Presents for   Chief Complaint   Patient presents with     Refill Request     Pt is here for medication refills.      Derm Problem     Pt states he has a rash in his nose and on his body he would like to discuss today.      Patient presents for follow-up visit today.  He is requesting medication refill.  Reports that otherwise he is doing okay and he has no new concerns.  He is wondering if there are any side effects to his long-term medications.    He reports that gout symptoms have been good.  He has had no gout flares.  Is taking the colchicine and the allopurinol regularly.  Interestingly he has a lot of allopurinol at home and not much colchicine.  Has been watching his diet as well.  Feels like the medications are helpful, but he is worried about long-term side effects.    Has also been taking cetirizine and using some Triamcinolone cream for rash.  Notes some itching along the sides of his nose, as well as along his legs, lower trunk, and calf area.  Uses the cetirizine as  needed.  Finds it quite helpful.  Keeps him from itching and then he doesn't end up with red spots and rashes.  Use the cream mostly on his face, and finds it helpful.    He shares that his knee and back pain are no longer bothering him.    He shares that his ear pressure and decreased hearing did not improve.  Saw ENT, and was fitted for hearing aids.  Wears them intermittently.  He does notice a pressure sensation when he wears them so he will utilize them only in situations where they are needed.    We reviewed and updated the rest of his medication list.  No longer using Patanol drops or Flonase.  He does not need refills on his headache medications.    He has received his COVID-19 booster, so is not due for this but is interested getting a flu shot today.          OBJECTIVE:  Vitals: /79 (BP Location: Left arm, Patient Position: Sitting, Cuff Size: Adult Regular)   Pulse 80   Temp 98.1  F (36.7  C) (Oral)   Resp 16   SpO2 98%   BMI= There is no height or weight on file to calculate BMI.  Objective:    Vitals:  Vitals are reviewed and are within the normal range  Gen:  Alert, pleasant, no acute distress  Cardiac:  Regular rate and rhythm, no murmurs, rubs or gallops  Respiratory:  Lungs clear to auscultation bilaterally  Skin: Patches of erythema across the nasal bridge and upper cheeks.  Also with some areas of rash on the lower legs, that are slightly elevated and not erythematous.  No active evidence of excoriation today.  Extremities:  Warm, well-perfused, pulses 2+/4, no lower extremity edema    Results for orders placed or performed in visit on 01/19/22   CBC with platelets and differential     Status: Abnormal   Result Value Ref Range    WBC Count 5.7 4.0 - 11.0 10e3/uL    RBC Count 6.04 (H) 4.40 - 5.90 10e6/uL    Hemoglobin 16.8 13.3 - 17.7 g/dL    Hematocrit 51.3 40.0 - 53.0 %    MCV 85 78 - 100 fL    MCH 27.8 26.5 - 33.0 pg    MCHC 32.7 31.5 - 36.5 g/dL    RDW 12.9 10.0 - 15.0 %    Platelet  Count 142 (L) 150 - 450 10e3/uL    % Neutrophils 55 %    % Lymphocytes 35 %    % Monocytes 8 %    % Eosinophils 1 %    % Basophils 1 %    % Immature Granulocytes 0 %    Absolute Neutrophils 3.2 1.6 - 8.3 10e3/uL    Absolute Lymphocytes 2.0 0.8 - 5.3 10e3/uL    Absolute Monocytes 0.5 0.0 - 1.3 10e3/uL    Absolute Eosinophils 0.1 0.0 - 0.7 10e3/uL    Absolute Basophils 0.0 0.0 - 0.2 10e3/uL    Absolute Immature Granulocytes 0.0 <=0.4 10e3/uL   CBC with Platelets & Differential     Status: Abnormal    Narrative    The following orders were created for panel order CBC with Platelets & Differential.  Procedure                               Abnormality         Status                     ---------                               -----------         ------                     CBC with platelets and d...[[id number]]  Abnormal            Final result                 Please view results for these tests on the individual orders.           Patient Instructions   Flu shots today.  Refill medications.   Follow up in 6 month.        Cintia Ortiz MD

## 2022-01-20 LAB — DEPRECATED CALCIDIOL+CALCIFEROL SERPL-MC: 14 UG/L (ref 30–80)

## 2022-01-20 ASSESSMENT — ANXIETY QUESTIONNAIRES: GAD7 TOTAL SCORE: 3

## 2022-01-20 NOTE — RESULT ENCOUNTER NOTE
Attempted to call patient and discuss results using [employer] .  No answer and voicemail has not been set up.  Will try again tomorrow.     Cintia Ortiz MD

## 2022-01-21 ENCOUNTER — PATIENT OUTREACH (OUTPATIENT)
Dept: ONCOLOGY | Facility: CLINIC | Age: 72
End: 2022-01-21
Payer: COMMERCIAL

## 2022-01-21 RX ORDER — ERGOCALCIFEROL 1.25 MG/1
50000 CAPSULE, LIQUID FILLED ORAL WEEKLY
Qty: 12 CAPSULE | Refills: 0 | Status: SHIPPED | OUTPATIENT
Start: 2022-01-21 | End: 2022-08-09

## 2022-01-21 RX ORDER — COLCHICINE 0.6 MG/1
TABLET ORAL
Qty: 30 TABLET | Refills: 5 | Status: SHIPPED | OUTPATIENT
Start: 2022-01-21 | End: 2022-08-09

## 2022-01-21 RX ORDER — ALLOPURINOL 300 MG/1
TABLET ORAL
Qty: 30 TABLET | Refills: 5 | Status: SHIPPED | OUTPATIENT
Start: 2022-01-21 | End: 2022-08-09

## 2022-01-21 NOTE — PROGRESS NOTES
Writer received referral for elevated ferritin. Reviewed for urgency based on labs and symptomology. Appropriate scheduling instructions added and referral sent to New Patient Scheduling for completion.

## 2022-01-24 NOTE — PROGRESS NOTES
RECORDS STATUS - ALL OTHER DIAGNOSIS      RECORDS RECEIVED FROM: [facility]   DATE RECEIVED: 3/21/2022   NOTES STATUS DETAILS   OFFICE NOTE from referring provider [facility]   referred by Cintia Ortiz MD   OFFICE NOTE from medical oncologist N/A    DISCHARGE SUMMARY from hospital N/A    DISCHARGE REPORT from the ER     OPERATIVE REPORT N/A    MEDICATION LIST [facility]   CLINICAL TRIAL TREATMENTS TO DATE     LABS     PATHOLOGY REPORTS N/A    ANYTHING RELATED TO DIAGNOSIS [facility] last updated on 1/19/2022   GENONOMIC TESTING     TYPE:     IMAGING (NEED IMAGES & REPORT)     CT SCANS     MRI     MAMMO     ULTRASOUND     PET

## 2022-03-16 ENCOUNTER — OFFICE VISIT (OUTPATIENT)
Dept: FAMILY MEDICINE | Facility: CLINIC | Age: 72
End: 2022-03-16
Payer: COMMERCIAL

## 2022-03-16 VITALS
HEIGHT: 63 IN | OXYGEN SATURATION: 95 % | DIASTOLIC BLOOD PRESSURE: 70 MMHG | SYSTOLIC BLOOD PRESSURE: 129 MMHG | TEMPERATURE: 98.8 F | WEIGHT: 152 LBS | BODY MASS INDEX: 26.93 KG/M2 | RESPIRATION RATE: 20 BRPM | HEART RATE: 98 BPM

## 2022-03-16 DIAGNOSIS — J30.2 SEASONAL ALLERGIC RHINITIS, UNSPECIFIED TRIGGER: ICD-10-CM

## 2022-03-16 DIAGNOSIS — L29.9 ITCHING: ICD-10-CM

## 2022-03-16 DIAGNOSIS — H10.13 ALLERGIC CONJUNCTIVITIS, BILATERAL: Primary | ICD-10-CM

## 2022-03-16 PROCEDURE — 99213 OFFICE O/P EST LOW 20 MIN: CPT | Performed by: FAMILY MEDICINE

## 2022-03-16 RX ORDER — CETIRIZINE HYDROCHLORIDE 10 MG/1
TABLET ORAL
Qty: 90 TABLET | Refills: 3 | Status: SHIPPED | OUTPATIENT
Start: 2022-03-16 | End: 2022-08-09

## 2022-03-16 ASSESSMENT — ACTIVITIES OF DAILY LIVING (ADL)
FALL_HISTORY_WITHIN_LAST_SIX_MONTHS: NO
TOILETING_ISSUES: NO
DRESSING/BATHING_DIFFICULTY: NO
HEARING_DIFFICULTY_OR_DEAF: NO
WALKING_OR_CLIMBING_STAIRS_DIFFICULTY: NO
DOING_ERRANDS_INDEPENDENTLY_DIFFICULTY: NO
CONCENTRATING,_REMEMBERING_OR_MAKING_DECISIONS_DIFFICULTY: NO
DIFFICULTY_COMMUNICATING: NO
CHANGE_IN_FUNCTIONAL_STATUS_SINCE_ONSET_OF_CURRENT_ILLNESS/INJURY: NO
DIFFICULTY_EATING/SWALLOWING: NO
WEAR_GLASSES_OR_BLIND: YES

## 2022-03-16 NOTE — PROGRESS NOTES
"Assessment & Plan     I think the symptoms that he is describing on his right cheek are allergic in nature.  After he began talking with this he reports having some ocular symptoms although none at the moment.  This makes my hunch, that this is allergic, seems stronger.  We will treat with Zyrtec and, because he asked, will get some Zaditor to take as needed when he is having itching of the eyes.    Itching    - cetirizine (ZYRTEC) 10 MG tablet; Take 1 pill daily as needed for itching    Seasonal allergic rhinitis, unspecified trigger    - cetirizine (ZYRTEC) 10 MG tablet; Take 1 pill daily as needed for itching    Allergic conjunctivitis, bilateral    - ketotifen (ZADITOR) 0.025 % ophthalmic solution; Place 1 drop into both eyes 2 times daily    Diagnosis or treatment significantly limited by social determinants of health - Language barrier and low health literacy  25 minutes spent on the date of the encounter doing patient visit and documentation        BMI:   Estimated body mass index is 27.27 kg/m  as calculated from the following:    Height as of this encounter: 1.59 m (5' 2.6\").    Weight as of this encounter: 68.9 kg (152 lb).     Nir Monroe MD  [facility]    Options for treatment and/or follow-up care were reviewed with the patient. Cristina Lyles was engaged and actively involved in the decision making process. He verbalized understanding of the options discussed and was satisfied with the final plan.      Subjective: Cristina Lyles is a 71 year old with a complaint of itching under the right eye.  It seems puffy in the upper right cheek.  This has gone on for over 2 weeks.  He has not tried anything for that.    His vision is fine and his eyeball is not red at all.  PMHX/PSHX/MEDS/ALLERGIES/SHX/FHX reviewed and updated in Epic.   ROS:   General: No fevers, chills; no sneezing or runny nose  Head: No headache   CV: No chest pain  Resp: No shortness of breath.No nose is dry.  GI: No " "nausea or vomiting.     Objective: /70   Pulse 98   Temp 98.8  F (37.1  C) (Oral)   Resp 20   Ht 5' 2.6\" (1.59 m)   Wt 152 lb (68.9 kg)   SpO2 95%   BMI 27.27 kg/m     Gen: Well nourished and in NAD   Face: Perhaps there is mild swelling over the zygoma and minimal erythema in the same region, not on the eyelid but under it  CV: RRR - no murmurs, rubs, or gallups  Pulm: Clear to auscultation without wheezing or crackles  ABD: soft, nontender, BS intact  Extrem: no cyanosis, edema or clubbing   Psych: Euthymic                 "

## 2022-03-16 NOTE — NURSING NOTE
VISION   No corrective lenses  Tool used: Mancuso   Right eye:        10/12.5 (20/25)  Left eye:          10/12.5 (20/25)  Both eye:          10/12.5 (20/25)  Visual Acuity: Pass    KRISTEN Medel

## 2022-03-21 ENCOUNTER — PRE VISIT (OUTPATIENT)
Dept: ONCOLOGY | Facility: HOSPITAL | Age: 72
End: 2022-03-21

## 2022-03-21 ENCOUNTER — ONCOLOGY VISIT (OUTPATIENT)
Dept: ONCOLOGY | Facility: HOSPITAL | Age: 72
End: 2022-03-21
Attending: STUDENT IN AN ORGANIZED HEALTH CARE EDUCATION/TRAINING PROGRAM
Payer: COMMERCIAL

## 2022-03-21 ENCOUNTER — APPOINTMENT (OUTPATIENT)
Dept: INFUSION THERAPY | Facility: HOSPITAL | Age: 72
End: 2022-03-21
Attending: INTERNAL MEDICINE
Payer: COMMERCIAL

## 2022-03-21 VITALS
HEART RATE: 87 BPM | WEIGHT: 153 LBS | HEIGHT: 62 IN | TEMPERATURE: 98.3 F | SYSTOLIC BLOOD PRESSURE: 144 MMHG | RESPIRATION RATE: 20 BRPM | BODY MASS INDEX: 28.16 KG/M2 | OXYGEN SATURATION: 96 % | DIASTOLIC BLOOD PRESSURE: 90 MMHG

## 2022-03-21 DIAGNOSIS — R79.89 ELEVATED FERRITIN: Primary | ICD-10-CM

## 2022-03-21 PROCEDURE — 87517 HEPATITIS B DNA QUANT: CPT | Performed by: INTERNAL MEDICINE

## 2022-03-21 PROCEDURE — 99204 OFFICE O/P NEW MOD 45 MIN: CPT | Performed by: INTERNAL MEDICINE

## 2022-03-21 PROCEDURE — 36415 COLL VENOUS BLD VENIPUNCTURE: CPT | Performed by: INTERNAL MEDICINE

## 2022-03-21 PROCEDURE — 86706 HEP B SURFACE ANTIBODY: CPT | Performed by: INTERNAL MEDICINE

## 2022-03-21 PROCEDURE — 86704 HEP B CORE ANTIBODY TOTAL: CPT | Performed by: INTERNAL MEDICINE

## 2022-03-21 PROCEDURE — 87340 HEPATITIS B SURFACE AG IA: CPT | Performed by: INTERNAL MEDICINE

## 2022-03-21 PROCEDURE — 83550 IRON BINDING TEST: CPT | Performed by: INTERNAL MEDICINE

## 2022-03-21 PROCEDURE — 86803 HEPATITIS C AB TEST: CPT | Performed by: INTERNAL MEDICINE

## 2022-03-21 PROCEDURE — G0463 HOSPITAL OUTPT CLINIC VISIT: HCPCS

## 2022-03-21 ASSESSMENT — PAIN SCALES - GENERAL: PAINLEVEL: NO PAIN (0)

## 2022-03-21 NOTE — LETTER
"    3/21/2022         RE: Cristina Lyles  [address]  [place] [address]        Dear Colleague,    Thank you for referring your patient, Cristina Lyles, to the [facility] [place]. Please see a copy of my visit note below.    Oncology Rooming Note    March 21, 2022 2:58 PM   Cristina Lyles is a 71 year old male who presents for:    Chief Complaint   Patient presents with     Hematology     New Patient - Elevated ferritin     Initial Vitals: BP (!) 144/90 (BP Location: Right arm, Patient Position: Sitting, Cuff Size: Adult Regular)   Pulse 87   Temp 98.3  F (36.8  C) (Oral)   Resp 20   Ht 1.581 m (5' 2.25\")   Wt 69.4 kg (153 lb)   SpO2 96%   BMI 27.76 kg/m   Estimated body mass index is 27.76 kg/m  as calculated from the following:    Height as of this encounter: 1.581 m (5' 2.25\").    Weight as of this encounter: 69.4 kg (153 lb). Body surface area is 1.75 meters squared.  No Pain (0) Comment: Data Unavailable   No LMP for male patient.  Allergies reviewed: Yes  Medications reviewed: Yes    Medications: Medication refills not needed today.  Pharmacy name entered into [employer]: [facility] - [place], [place] - [address]    Clinical concerns: New Patient - Elevated ferritin.      Xochilt Ross, [facility] Hematology and Oncology Consult Note    Patient: Cristina Lyles  MRN: [id number]  Date of Service: Mar 21, 2022       Reason for Visit    I was consulted by Dr. Ortiz for elevated ferritin    Assessment/Plan    Elevated ferritin  Positive hepatitis B core antibody and surface antibody, April 2021  History of gout and arthritis  Chronic kidney disease      Ferritin level has been persistently elevated since March of last year although decreasing.  Felt possibly related to Covid vaccination.    No signs, symptoms or family history to suggest hemochromatosis.    No known history of liver disease but previous labs show positive test for hepatitis core " antibody and hepatitis surface antibody and unclear if this might reflect occult disease.    Reviewed with patient.    We will check labs today including iron saturation, hepatitis C antibody and repeat hepatitis B tests and also do liver ultrasound.    We will see patient back in a couple of weeks to review results and make further recommendations.    Questions answered and history taken with help of .    Plan: Labs and ultrasound as above  Follow-up in a couple of weeks to review        ECOG Performance    0 - Independent    Encounter Diagnoses:    Problem List Items Addressed This Visit        Other    Elevated ferritin - Primary    Relevant Orders    Hepatitis B core antibody    Hepatitis B Surface Antibody    Hepatitis B surface antigen    Hep B Virus DNA Quant Real Time PCR    Hepatitis C antibody    Iron & Iron Binding Capacity    US Abdomen Complete    Check Out Appointment Request              ______________________________________________________________________________    Staging History  Cancer Staging  No matching staging information was found for the patient.      History    Mr. Cristina Lyles is a 71 year old with past history of chronic gout, arthritis, chronic kidney disease, abnormal TSH who was referred for evaluation of persistently elevated ferritin.    Patient reports overall to feel okay.  Sometimes he has poor sleep associated with elevated blood pressure and fuzzy vision.  Energy levels are fine.  No skin changes or rash.  Does have history of gout and also bilateral knee pain.  Denies liver disease, hepatitis or blood transfusion.  No family history to suggest hemochromatosis.  Labs last April showed some abnormal hepatitis B antibody results.    No surgical history.  Hospitalized many years ago for gastritis.     but .  Lives in [place].  Retired but worked as a .  Does not smoke and drinks occasionally but not for the last 30 years.    No personal or family  history of cancer.                Review of systems.  No fever or night sweats.  No loss of weight.  No lumps or bumps anywhere.  No unusual headaches or eyesight issues.  No dizziness.  No bleeding from the nose.  No sores in the mouth. No problems with swallowing.  No chest pain. No shortness of breath. No cough.  No abdominal pain. No nausea or vomiting.  No diarrhea or constipation.  No blood in stool or black colored stools.  No problems passing urine.  No numbness or tingling in hands or feet.  No skin rashes.  A 14 point review of systems is otherwise negative.        Past History    Past Medical History:   Diagnosis Date     Chronic gastric ulcer      Constipation      Depressive disorder      Gout      Headache 9/28/2016     Past Surgical History:   Procedure Laterality Date     NO PAST SURGERIES       Family History   Problem Relation Age of Onset     Diabetes No family hx of      Coronary Artery Disease No family hx of      Breast Cancer No family hx of      Colon Cancer No family hx of      Prostate Cancer No family hx of      Other Cancer No family hx of      Cancer No family hx of      Heart Disease No family hx of      Social History     Socioeconomic History     Marital status:      Spouse name: None     Number of children: None     Years of education: None     Highest education level: None   Occupational History     None   Tobacco Use     Smoking status: Never Smoker     Smokeless tobacco: Never Used   Substance and Sexual Activity     Alcohol use: No     Drug use: No     Sexual activity: None   Other Topics Concern     None   Social History Narrative     None     Social Determinants of Health     Financial Resource Strain: Not on file   Food Insecurity: Not on file   Transportation Needs: Not on file   Physical Activity: Not on file   Stress: Not on file   Social Connections: Not on file   Intimate Partner Violence: Not on file   Housing Stability: Not on file         Allergies    Allergies  "  Allergen Reactions     Nkda [No Known Drug Allergies]            Physical Exam    BP (!) 144/90 (BP Location: Right arm, Patient Position: Sitting, Cuff Size: Adult Regular)   Pulse 87   Temp 98.3  F (36.8  C) (Oral)   Resp 20   Ht 1.581 m (5' 2.25\")   Wt 69.4 kg (153 lb)   SpO2 96%   BMI 27.76 kg/m        GENERAL: Alert and oriented to time place and person. Seated comfortably. In no distress.    HEAD: Atraumatic and normocephalic.    EYES: SUKHJINDER, EOMI.  No pallor.  No icterus.    Oral cavity: no mucosal lesion or tonsillar enlargement.    NECK: supple. JVP normal.  No thyroid enlargement.    LYMPH NODES: No palpable, cervical, axillary or inguinal lymphadenopathy.    CHEST: clear to auscultation bilaterally.  Resonant to percussion throughout bilaterally.  Symmetrical breath movements bilaterally.    CVS: S1 and S2 are heard. Regular rate and rhythm.  No murmur or gallop or rub heard.  No peripheral edema.    ABDOMEN: Soft. Not tender. Not distended.  No palpable hepatomegaly or splenomegaly.  No other mass palpable.  Bowel sounds heard.    EXTREMITIES: Warm.    SKIN: no rash, or bruising or purpura.  Has a full head of hair.    CNS: Nonfocal    Lab Results    CBC, CMP and TSH are normal.  Ferritin recently 1137 previously 2058  Otitis B core antibody and hepatitis B surface antibody are positive, hepatitis B surface antigen is negative; HIV testing is negative  Imaging Results    No results found.      Signed by: Tammie Zhang MD      Again, thank you for allowing me to participate in the care of your patient.        Sincerely,        Tammie Zhang MD    "

## 2022-03-21 NOTE — PROGRESS NOTES
[facility] Hematology and Oncology Consult Note    Patient: Cristina Lyles  MRN: [id number]  Date of Service: Mar 21, 2022       Reason for Visit    I was consulted by Dr. Ortiz for elevated ferritin    Assessment/Plan    Elevated ferritin  Positive hepatitis B core antibody and surface antibody, April 2021  History of gout and arthritis  Chronic kidney disease      Ferritin level has been persistently elevated since March of last year although decreasing.  Felt possibly related to Covid vaccination.    No signs, symptoms or family history to suggest hemochromatosis.    No known history of liver disease but previous labs show positive test for hepatitis core antibody and hepatitis surface antibody and unclear if this might reflect occult disease.    Reviewed with patient.    We will check labs today including iron saturation, hepatitis C antibody and repeat hepatitis B tests and also do liver ultrasound.    We will see patient back in a couple of weeks to review results and make further recommendations.    Questions answered and history taken with help of .    Plan: Labs and ultrasound as above  Follow-up in a couple of weeks to review        ECOG Performance    0 - Independent    Encounter Diagnoses:    Problem List Items Addressed This Visit        Other    Elevated ferritin - Primary    Relevant Orders    Hepatitis B core antibody    Hepatitis B Surface Antibody    Hepatitis B surface antigen    Hep B Virus DNA Quant Real Time PCR    Hepatitis C antibody    Iron & Iron Binding Capacity    US Abdomen Complete    Check Out Appointment Request              ______________________________________________________________________________    Staging History  Cancer Staging  No matching staging information was found for the patient.      History    Mr. Cristina Lyles is a 71 year old with past history of chronic gout, arthritis, chronic kidney disease, abnormal TSH who was referred for evaluation of  persistently elevated ferritin.    Patient reports overall to feel okay.  Sometimes he has poor sleep associated with elevated blood pressure and fuzzy vision.  Energy levels are fine.  No skin changes or rash.  Does have history of gout and also bilateral knee pain.  Denies liver disease, hepatitis or blood transfusion.  No family history to suggest hemochromatosis.  Labs last April showed some abnormal hepatitis B antibody results.    No surgical history.  Hospitalized many years ago for gastritis.     but .  Lives in [place].  Retired but worked as a .  Does not smoke and drinks occasionally but not for the last 30 years.    No personal or family history of cancer.                Review of systems.  No fever or night sweats.  No loss of weight.  No lumps or bumps anywhere.  No unusual headaches or eyesight issues.  No dizziness.  No bleeding from the nose.  No sores in the mouth. No problems with swallowing.  No chest pain. No shortness of breath. No cough.  No abdominal pain. No nausea or vomiting.  No diarrhea or constipation.  No blood in stool or black colored stools.  No problems passing urine.  No numbness or tingling in hands or feet.  No skin rashes.  A 14 point review of systems is otherwise negative.        Past History    Past Medical History:   Diagnosis Date     Chronic gastric ulcer      Constipation      Depressive disorder      Gout      Headache 9/28/2016     Past Surgical History:   Procedure Laterality Date     NO PAST SURGERIES       Family History   Problem Relation Age of Onset     Diabetes No family hx of      Coronary Artery Disease No family hx of      Breast Cancer No family hx of      Colon Cancer No family hx of      Prostate Cancer No family hx of      Other Cancer No family hx of      Cancer No family hx of      Heart Disease No family hx of      Social History     Socioeconomic History     Marital status:      Spouse name: None     Number of children:  "None     Years of education: None     Highest education level: None   Occupational History     None   Tobacco Use     Smoking status: Never Smoker     Smokeless tobacco: Never Used   Substance and Sexual Activity     Alcohol use: No     Drug use: No     Sexual activity: None   Other Topics Concern     None   Social History Narrative     None     Social Determinants of Health     Financial Resource Strain: Not on file   Food Insecurity: Not on file   Transportation Needs: Not on file   Physical Activity: Not on file   Stress: Not on file   Social Connections: Not on file   Intimate Partner Violence: Not on file   Housing Stability: Not on file         Allergies    Allergies   Allergen Reactions     Nkda [No Known Drug Allergies]            Physical Exam    BP (!) 144/90 (BP Location: Right arm, Patient Position: Sitting, Cuff Size: Adult Regular)   Pulse 87   Temp 98.3  F (36.8  C) (Oral)   Resp 20   Ht 1.581 m (5' 2.25\")   Wt 69.4 kg (153 lb)   SpO2 96%   BMI 27.76 kg/m        GENERAL: Alert and oriented to time place and person. Seated comfortably. In no distress.    HEAD: Atraumatic and normocephalic.    EYES: SUKHJINDER, EOMI.  No pallor.  No icterus.    Oral cavity: no mucosal lesion or tonsillar enlargement.    NECK: supple. JVP normal.  No thyroid enlargement.    LYMPH NODES: No palpable, cervical, axillary or inguinal lymphadenopathy.    CHEST: clear to auscultation bilaterally.  Resonant to percussion throughout bilaterally.  Symmetrical breath movements bilaterally.    CVS: S1 and S2 are heard. Regular rate and rhythm.  No murmur or gallop or rub heard.  No peripheral edema.    ABDOMEN: Soft. Not tender. Not distended.  No palpable hepatomegaly or splenomegaly.  No other mass palpable.  Bowel sounds heard.    EXTREMITIES: Warm.    SKIN: no rash, or bruising or purpura.  Has a full head of hair.    CNS: Nonfocal    Lab Results    CBC, CMP and TSH are normal.  Ferritin recently 1137 previously 2058  Otitis " B core antibody and hepatitis B surface antibody are positive, hepatitis B surface antigen is negative; HIV testing is negative  Imaging Results    No results found.      Signed by: Tammie Zhang MD

## 2022-03-21 NOTE — PROGRESS NOTES
"Oncology Rooming Note    March 21, 2022 2:58 PM   Cristina Lyles is a 71 year old male who presents for:    Chief Complaint   Patient presents with     Hematology     New Patient - Elevated ferritin     Initial Vitals: BP (!) 144/90 (BP Location: Right arm, Patient Position: Sitting, Cuff Size: Adult Regular)   Pulse 87   Temp 98.3  F (36.8  C) (Oral)   Resp 20   Ht 1.581 m (5' 2.25\")   Wt 69.4 kg (153 lb)   SpO2 96%   BMI 27.76 kg/m   Estimated body mass index is 27.76 kg/m  as calculated from the following:    Height as of this encounter: 1.581 m (5' 2.25\").    Weight as of this encounter: 69.4 kg (153 lb). Body surface area is 1.75 meters squared.  No Pain (0) Comment: Data Unavailable   No LMP for male patient.  Allergies reviewed: Yes  Medications reviewed: Yes    Medications: Medication refills not needed today.  Pharmacy name entered into [facility]: [facility] - [place], [place] - [facility] [address]    Clinical concerns: New Patient - Elevated ferritin.      Xochilt Ross CMA              "

## 2022-03-22 LAB
HBV DNA SERPL NAA+PROBE-ACNC: NOT DETECTED IU/ML
HBV SURFACE AB SERPLBLD QL IA.RAPID: POSITIVE
HBV SURFACE AG SERPL QL IA: NONREACTIVE
HCV AB SERPL QL IA: NEGATIVE
IRON SATN MFR SERPL: 36 % (ref 15–46)
IRON SERPL-MCNC: 93 UG/DL (ref 35–180)
TIBC SERPL-MCNC: 255 UG/DL (ref 240–430)

## 2022-03-23 LAB — HBV CORE AB SERPL QL IA: POSITIVE

## 2022-04-01 ENCOUNTER — HOSPITAL ENCOUNTER (OUTPATIENT)
Dept: ULTRASOUND IMAGING | Facility: HOSPITAL | Age: 72
Discharge: HOME | End: 2022-04-01
Attending: INTERNAL MEDICINE | Admitting: INTERNAL MEDICINE
Payer: COMMERCIAL

## 2022-04-01 DIAGNOSIS — R79.89 ELEVATED FERRITIN: ICD-10-CM

## 2022-04-01 PROCEDURE — 76700 US EXAM ABDOM COMPLETE: CPT

## 2022-04-05 ENCOUNTER — TELEPHONE (OUTPATIENT)
Dept: ONCOLOGY | Facility: HOSPITAL | Age: 72
End: 2022-04-05
Payer: COMMERCIAL

## 2022-04-05 NOTE — TELEPHONE ENCOUNTER
4/5 appt with Dr Momin, ultrasound followup was a NO SHOW    Called Cristina Lyles - was told wrong number immediately and phone hung up    Called emergency contact, son Joseph    He took down phone number and will have his dad call back when he gets home    Cancer Care   [phone]

## 2022-04-14 ENCOUNTER — ONCOLOGY VISIT (OUTPATIENT)
Dept: ONCOLOGY | Facility: HOSPITAL | Age: 72
End: 2022-04-14
Attending: INTERNAL MEDICINE
Payer: COMMERCIAL

## 2022-04-14 ENCOUNTER — TRANSFERRED RECORDS (OUTPATIENT)
Dept: HEALTH INFORMATION MANAGEMENT | Facility: CLINIC | Age: 72
End: 2022-04-14

## 2022-04-14 VITALS
BODY MASS INDEX: 27.65 KG/M2 | OXYGEN SATURATION: 97 % | SYSTOLIC BLOOD PRESSURE: 120 MMHG | WEIGHT: 152.4 LBS | RESPIRATION RATE: 16 BRPM | TEMPERATURE: 98.7 F | DIASTOLIC BLOOD PRESSURE: 68 MMHG | HEART RATE: 101 BPM

## 2022-04-14 DIAGNOSIS — R79.89 ELEVATED FERRITIN: ICD-10-CM

## 2022-04-14 DIAGNOSIS — B18.1 CHRONIC VIRAL HEPATITIS B WITHOUT DELTA AGENT AND WITHOUT COMA (H): Primary | ICD-10-CM

## 2022-04-14 PROCEDURE — 99213 OFFICE O/P EST LOW 20 MIN: CPT | Performed by: NURSE PRACTITIONER

## 2022-04-14 PROCEDURE — G0463 HOSPITAL OUTPT CLINIC VISIT: HCPCS

## 2022-04-14 ASSESSMENT — PAIN SCALES - GENERAL: PAINLEVEL: NO PAIN (0)

## 2022-04-14 NOTE — PROGRESS NOTES
[facility] Hematology and Oncology Progress Note    Patient: Cristina Lyles  MRN: [id number]  Date of Service: Apr 14, 2022          Reason for Visit    Chief Complaint   Patient presents with     Oncology Clinic Visit       Assessment and Plan    Cancer Staging  No matching staging information was found for the patient.    1. Elevated Ferritin: This has slowly been coming down over the last year.  His liver ultrasound does not show any iron deposits.  No other signs of hemochromatosis.  His serum iron levels are normal.  This point I think we can continue to monitor his ferritin.  I told him he could do that his primary care doctors and just needs to be done once a year.  If it starts increasing we be more than happy to see him back in our clinic.    2. Positive Hepatitis B core antibody and surface antibody, but negative Quantitative DNA: At this time because there is a little bit of a disconnect between the lab work I Mireille go ahead and refer him to hepatologist at [facility] to be evaluated if anything needs to be done about this positive hepatitis B labs.    Discussed with Dr. Zhang    ECOG Performance    0 - Independent    Distress Screening (within last 30 days)    1. How concerned are you about your ability to eat? : 0  2. How concerned are you about unintended weight loss or your current weight? : 0  3. How concerned are you about feeling depressed or very sad? : 4  4. How concerned are you about feeling anxious or very scared? : 3  5. Do you struggle with the loss of meaning and mary in your life? : Not at all  6. How concerned are you about work and home life issues that may be affected by your cancer? : 0  7. How concerned are you about knowing what resources are available to help you? : 0  8. Do you currently have what you would describe as Muslim or spiritual struggles?            : Not at all       Pain  Pain Score: No Pain (0)    Problem List    Patient Active Problem List   Diagnosis      Anxiety state     Constipation     Depressive disorder, not elsewhere classified     Esophageal reflux     Major depressive disorder, recurrent episode (H)     Health Care Home     Gout     Rosacea     Conductive hearing loss, bilateral     Elevated ferritin        ______________________________________________________________________________    History of Present Illness    Mr. Lyles is a pleasant 71-year-old gentleman who is referred to our clinic for a persistently elevated ferritin level.  He was seen by Dr. Zhang in March who did an liver ultrasound and some other blood tests.  He is here to review those.  Patient states that he has been feeling fine.  Has not noticed any itching.  Denies any bone or back pain.  Denies any abdominal pain.  Denies any weight loss.  Denies any jaundice.  Nuys any changes in his bowels or bladder.    Review of Systems    Pertinent items are noted in HPI.    Past History    Past Medical History:   Diagnosis Date     Chronic gastric ulcer      Constipation      Depressive disorder      Gout      Headache 9/28/2016       PHYSICAL EXAM  /68   Pulse 101   Temp 98.7  F (37.1  C)   Resp 16   Wt 69.1 kg (152 lb 6.4 oz)   SpO2 97%   BMI 27.65 kg/m      GENERAL: no acute distress. Cooperative in conversation. Here alone. Mask on.  Interpreted on the telephone.  RESP: Regular respiratory rate. No expiratory wheezes   MUSCULOSKELETAL: no bilateral leg swelling  NEURO: non focal. Alert and oriented x3.   PSYCH: within normal limits. No depression or anxiety.  SKIN: exposed skin is dry intact.     Lab Results    No visits with results within 10 Day(s) from this visit.   Latest known visit with results is:   Oncology Visit on 03/21/2022   Component Date Value Ref Range Status     Hepatitis B Core Antibody Total 03/21/2022 Positive (A) Negative Final     Hepatitis B Surface Antibody 03/21/2022 Positive (A) Negative Final     Hepatitis B Surface Antigen 03/21/2022 Nonreactive   Nonreactive Final     Hepatitis B DNA IU/mL 03/21/2022 Not Detected  Not Detected IU/mL Final     Hepatitis C Antibody 03/21/2022 Negative  Negative Final     Iron 03/21/2022 93  35 - 180 ug/dL Final     Iron Binding Capacity 03/21/2022 255  240 - 430 ug/dL Final     Iron Sat Index 03/21/2022 36  15 - 46 % Final     Lab Results   Component Value Date    LINDEN 1,137 (H) 01/19/2022    LINDEN 1,263 (H) 04/02/2021    LINDEN 2,088 (H) 03/02/2021   ]  Imaging    US Abdomen Complete    Result Date: 4/1/2022  EXAM: US ABDOMEN COMPLETE LOCATION: [facility] DATE/TIME: 4/1/2022 1:32 PM INDICATION: elevated ferritin; ? liver abnormalities COMPARISON: CT abdomen and pelvis from 10/25/2013. TECHNIQUE: Complete abdominal ultrasound. FINDINGS: GALLBLADDER: Normal. No gallstones, wall thickening, or pericholecystic fluid. Negative sonographic Waddell's sign. BILE DUCTS: No biliary dilatation. The common duct measures 4 mm. LIVER: Increased echogenicity from diffuse fatty infiltration. No focal mass. RIGHT KIDNEY: Normal size. Normal echogenicity with no hydronephrosis or mass. LEFT KIDNEY: Normal size. Normal echogenicity with no hydronephrosis or mass. Benign left renal cysts do not require dedicated follow-up. The largest measures up to 9 mm. SPLEEN: Normal. PANCREAS: The visualized portions are normal. AORTA: Normal in caliber. IVC: Normal where visualized. No ascites.     IMPRESSION: 1.  Hepatic steatosis. 2.  Benign left renal cyst.         Signed by: LAURA Galaviz CNP

## 2022-04-14 NOTE — LETTER
"    4/14/2022         RE: Cristina Lyles  [address]  [place] [address]        Dear Colleague,    Thank you for referring your patient, Cristina Lyles, to the [facility] CANCER [facility]. Please see a copy of my visit note below.    Oncology Rooming Note    April 14, 2022 10:32 AM   Cristina Lyles is a 71 year old male who presents for:    Chief Complaint   Patient presents with     Oncology Clinic Visit     Initial Vitals: /68   Pulse 101   Temp 98.7  F (37.1  C)   Resp 16   Wt 69.1 kg (152 lb 6.4 oz)   SpO2 97%   BMI 27.65 kg/m   Estimated body mass index is 27.65 kg/m  as calculated from the following:    Height as of 3/21/22: 1.581 m (5' 2.25\").    Weight as of this encounter: 69.1 kg (152 lb 6.4 oz). Body surface area is 1.74 meters squared.  No Pain (0) Comment: Data Unavailable   No LMP for male patient.  Allergies reviewed: Yes  Medications reviewed: Yes    Medications: Medication refills not needed today.  Pharmacy name entered into [facility]: [facility] - [place], [place] - [address]    Clinical concerns: concerned about the results of imaging he gotten       Lori Ramírez LPN              [facility] Hematology and Oncology Progress Note    Patient: Cristina Lyles  MRN: [id number]  Date of Service: Apr 14, 2022          Reason for Visit    Chief Complaint   Patient presents with     Oncology Clinic Visit       Assessment and Plan    Cancer Staging  No matching staging information was found for the patient.    1. Elevated Ferritin: This has slowly been coming down over the last year.  His liver ultrasound does not show any iron deposits.  No other signs of hemochromatosis.  His serum iron levels are normal.  This point I think we can continue to monitor his ferritin.  I told him he could do that his primary care doctors and just needs to be done once a year.  If it starts increasing we be more than happy to see him back in our clinic.    2. Positive Hepatitis B " core antibody and surface antibody, but negative Quantitative DNA: At this time because there is a little bit of a disconnect between the lab work I Mireille go ahead and refer him to hepatologist at [facility] to be evaluated if anything needs to be done about this positive hepatitis B labs.    Discussed with Dr. Zhang    ECOG Performance    0 - Independent    Distress Screening (within last 30 days)    1. How concerned are you about your ability to eat? : 0  2. How concerned are you about unintended weight loss or your current weight? : 0  3. How concerned are you about feeling depressed or very sad? : 4  4. How concerned are you about feeling anxious or very scared? : 3  5. Do you struggle with the loss of meaning and mary in your life? : Not at all  6. How concerned are you about work and home life issues that may be affected by your cancer? : 0  7. How concerned are you about knowing what resources are available to help you? : 0  8. Do you currently have what you would describe as [employer] or spiritual struggles?            : Not at all       Pain  Pain Score: No Pain (0)    Problem List    Patient Active Problem List   Diagnosis     Anxiety state     Constipation     Depressive disorder, not elsewhere classified     Esophageal reflux     Major depressive disorder, recurrent episode (H)     Health Care Home     Gout     Rosacea     Conductive hearing loss, bilateral     Elevated ferritin        ______________________________________________________________________________    History of Present Illness    Mr. Lyles is a pleasant 71-year-old gentleman who is referred to our clinic for a persistently elevated ferritin level.  He was seen by Dr. Zhang in March who did an liver ultrasound and some other blood tests.  He is here to review those.  Patient states that he has been feeling fine.  Has not noticed any itching.  Denies any bone or back pain.  Denies any abdominal pain.  Denies any weight loss.  Denies  any jaundice.  Nuys any changes in his bowels or bladder.    Review of Systems    Pertinent items are noted in HPI.    Past History    Past Medical History:   Diagnosis Date     Chronic gastric ulcer      Constipation      Depressive disorder      Gout      Headache 9/28/2016       PHYSICAL EXAM  /68   Pulse 101   Temp 98.7  F (37.1  C)   Resp 16   Wt 69.1 kg (152 lb 6.4 oz)   SpO2 97%   BMI 27.65 kg/m      GENERAL: no acute distress. Cooperative in conversation. Here alone. Mask on.  Interpreted on the telephone.  RESP: Regular respiratory rate. No expiratory wheezes   MUSCULOSKELETAL: no bilateral leg swelling  NEURO: non focal. Alert and oriented x3.   PSYCH: within normal limits. No depression or anxiety.  SKIN: exposed skin is dry intact.     Lab Results    No visits with results within 10 Day(s) from this visit.   Latest known visit with results is:   Oncology Visit on 03/21/2022   Component Date Value Ref Range Status     Hepatitis B Core Antibody Total 03/21/2022 Positive (A) Negative Final     Hepatitis B Surface Antibody 03/21/2022 Positive (A) Negative Final     Hepatitis B Surface Antigen 03/21/2022 Nonreactive  Nonreactive Final     Hepatitis B DNA IU/mL 03/21/2022 Not Detected  Not Detected IU/mL Final     Hepatitis C Antibody 03/21/2022 Negative  Negative Final     Iron 03/21/2022 93  35 - 180 ug/dL Final     Iron Binding Capacity 03/21/2022 255  240 - 430 ug/dL Final     Iron Sat Index 03/21/2022 36  15 - 46 % Final     Lab Results   Component Value Date    LINDEN 1,137 (H) 01/19/2022    LINDEN 1,263 (H) 04/02/2021    LINDEN 2,088 (H) 03/02/2021   ]  Imaging    US Abdomen Complete    Result Date: 4/1/2022  EXAM: US ABDOMEN COMPLETE LOCATION: [facility] DATE/TIME: 4/1/2022 1:32 PM INDICATION: elevated ferritin; ? liver abnormalities COMPARISON: CT abdomen and pelvis from 10/25/2013. TECHNIQUE: Complete abdominal ultrasound. FINDINGS: GALLBLADDER: Normal. No gallstones, wall  thickening, or pericholecystic fluid. Negative sonographic Waddell's sign. BILE DUCTS: No biliary dilatation. The common duct measures 4 mm. LIVER: Increased echogenicity from diffuse fatty infiltration. No focal mass. RIGHT KIDNEY: Normal size. Normal echogenicity with no hydronephrosis or mass. LEFT KIDNEY: Normal size. Normal echogenicity with no hydronephrosis or mass. Benign left renal cysts do not require dedicated follow-up. The largest measures up to 9 mm. SPLEEN: Normal. PANCREAS: The visualized portions are normal. AORTA: Normal in caliber. IVC: Normal where visualized. No ascites.     IMPRESSION: 1.  Hepatic steatosis. 2.  Benign left renal cyst.         Signed by: LAURA Galaviz CNP      Again, thank you for allowing me to participate in the care of your patient.        Sincerely,        LAURA Galaviz CNP

## 2022-04-14 NOTE — PROGRESS NOTES
"Oncology Rooming Note    April 14, 2022 10:32 AM   Cristina Lyles is a 71 year old male who presents for:    Chief Complaint   Patient presents with     Oncology Clinic Visit     Initial Vitals: /68   Pulse 101   Temp 98.7  F (37.1  C)   Resp 16   Wt 69.1 kg (152 lb 6.4 oz)   SpO2 97%   BMI 27.65 kg/m   Estimated body mass index is 27.65 kg/m  as calculated from the following:    Height as of 3/21/22: 1.581 m (5' 2.25\").    Weight as of this encounter: 69.1 kg (152 lb 6.4 oz). Body surface area is 1.74 meters squared.  No Pain (0) Comment: Data Unavailable   No LMP for male patient.  Allergies reviewed: Yes  Medications reviewed: Yes    Medications: Medication refills not needed today.  Pharmacy name entered into [employer]: [facility] - [place], [place] - [facility] [address]    Clinical concerns: concerned about the results of imaging he gotten       Lori Ramírez LPN            "

## 2022-07-06 ENCOUNTER — OFFICE VISIT (OUTPATIENT)
Dept: FAMILY MEDICINE | Facility: CLINIC | Age: 72
End: 2022-07-06
Payer: COMMERCIAL

## 2022-07-06 VITALS
SYSTOLIC BLOOD PRESSURE: 126 MMHG | BODY MASS INDEX: 26.45 KG/M2 | HEART RATE: 70 BPM | TEMPERATURE: 97.3 F | WEIGHT: 145.8 LBS | DIASTOLIC BLOOD PRESSURE: 71 MMHG | OXYGEN SATURATION: 100 % | RESPIRATION RATE: 16 BRPM

## 2022-07-06 DIAGNOSIS — T78.40XA ALLERGIC REACTION, INITIAL ENCOUNTER: Primary | ICD-10-CM

## 2022-07-06 PROCEDURE — 99213 OFFICE O/P EST LOW 20 MIN: CPT | Mod: GC | Performed by: STUDENT IN AN ORGANIZED HEALTH CARE EDUCATION/TRAINING PROGRAM

## 2022-07-06 RX ORDER — HYDROCORTISONE VALERATE 2 MG/G
OINTMENT TOPICAL 2 TIMES DAILY
Qty: 60 G | Refills: 0 | Status: SHIPPED | OUTPATIENT
Start: 2022-07-06 | End: 2022-08-09

## 2022-07-06 RX ORDER — CETIRIZINE HYDROCHLORIDE 10 MG/1
10 TABLET ORAL 2 TIMES DAILY PRN
Qty: 30 TABLET | Refills: 0 | Status: SHIPPED | OUTPATIENT
Start: 2022-07-06 | End: 2022-08-09

## 2022-07-06 NOTE — PROGRESS NOTES
Assessment and Plan    Cristina was seen today for eye problem and derm problem.  Urticaria with some redness of the eyes that is associated with clear tearing.  Symptoms started after using new hygiene products.  His symptoms most likely due to allergic reaction to the new product, patient was consulted to stop using the paretic    Diagnoses and all orders for this visit:    Allergic reaction, initial encounter  -     cetirizine (ZYRTEC) 10 MG tablet; Take 1 tablet (10 mg) by mouth 2 times daily as needed for allergies  -     hydrocortisone valerate (WEST-MEIR) 0.2 % external ointment; Apply topically 2 times daily Apply to buttok             Options for treatment and follow-up care were reviewed with the patient. Patient engaged in the decision making process and verbalized understanding of the options discussed and agreed with the final plan.    Patient was staffed with attending physician MD Clau Cade MD PGY3           HPI       Cristina Lyles is a 71 year old year old male w/ PMH of   Patient Active Problem List   Diagnosis     Anxiety state     Constipation     Depressive disorder, not elsewhere classified     Esophageal reflux     Major depressive disorder, recurrent episode (H)     Health Care Home     Gout     Rosacea     Conductive hearing loss, bilateral     Elevated ferritin    who presents for itching, redness of on, mainly on the right buttock for several days.  The itching associated with eyes clear tearing.  Denies rash or runny nose.  Stated it started using new hygiene products after which he started having his symptoms.  Denies cough, joint pain, fever or chills.          A SPARQCode  was used for  this visit.    +++++++      Problem, Medication and Allergy Lists were reviewed and updated if needed.    Patient is an established patient of this clinic.         Review of Systems:   10 point ROS negative other than as specified above.         Physical Exam:   /71 (BP  Location: Left arm, Patient Position: Sitting, Cuff Size: Adult Regular)   Pulse 70   Temp 97.3  F (36.3  C) (Oral)   Resp 16   Wt 66.1 kg (145 lb 12.8 oz)   SpO2 100%   BMI 26.45 kg/m      Vitals were reviewed and were normal     Exam:    Constitutional: healthy, alert, no distress, and cooperative  Head: Normocephalic. No masses, lesions, tenderness or abnormalities  Neck: Neck supple. No adenopathy.  ENT: throat normal without erythema or exudate  Cardiovascular: RRR w/o audible murmur  Respiratory: bilateral clear lungs w/o wheezing, crackles or rhonchi; breathing comfortably on RA  Gastrointestinal: Abdomen soft, non-tender. BS normal.  : Deferred  Musculoskeletal: extremities normal- no gross deformities noted, gait normal, and normal muscle tone  Skin: no no rash or lesion.  Red patch on the right buttock, 6-8 Inches in diameter.  No sign of excoriation  Neurologic: grossly normal CN; normal strength, sensation, & tone  Psychiatric: mentation appears normal and affect normal/bright        Results:    Results from this visitNo results found for any visits on 07/06/22.

## 2022-07-06 NOTE — NURSING NOTE
Due to patient being non-English speaking/uses sign language, an  was used for this visit. Only for face-to-face interpretation by an external agency, date and length of interpretation can be found on the scanned worksheet.     name: Danielle Sorenson  Language: Zulayong  Agency: [employer]  Type of interpretation:  Face-to-face, spoken      XANDER Guillen  1:23 PM  7/6/2022

## 2022-07-06 NOTE — PROGRESS NOTES
Preceptor Attestation:    I discussed the patient with the resident and evaluated the patient in person. I have verified the content of the note, which accurately reflects my assessment of the patient and the plan of care.   Supervising Physician:  Joshua Bauer MD.

## 2022-08-09 ENCOUNTER — OFFICE VISIT (OUTPATIENT)
Dept: FAMILY MEDICINE | Facility: CLINIC | Age: 72
End: 2022-08-09
Payer: COMMERCIAL

## 2022-08-09 VITALS
WEIGHT: 142.6 LBS | TEMPERATURE: 98.2 F | OXYGEN SATURATION: 99 % | SYSTOLIC BLOOD PRESSURE: 135 MMHG | HEART RATE: 68 BPM | DIASTOLIC BLOOD PRESSURE: 79 MMHG | RESPIRATION RATE: 20 BRPM | BODY MASS INDEX: 25.27 KG/M2 | HEIGHT: 63 IN

## 2022-08-09 DIAGNOSIS — R79.89 ELEVATED FERRITIN: ICD-10-CM

## 2022-08-09 DIAGNOSIS — Z23 HIGH PRIORITY FOR 2019-NCOV VACCINE: ICD-10-CM

## 2022-08-09 DIAGNOSIS — H10.13 ALLERGIC CONJUNCTIVITIS, BILATERAL: ICD-10-CM

## 2022-08-09 DIAGNOSIS — E55.9 VITAMIN D DEFICIENCY: ICD-10-CM

## 2022-08-09 DIAGNOSIS — M1A.0710 CHRONIC GOUT OF RIGHT FOOT, UNSPECIFIED CAUSE: ICD-10-CM

## 2022-08-09 DIAGNOSIS — L29.9 ITCHING: Primary | ICD-10-CM

## 2022-08-09 LAB
ALBUMIN SERPL BCG-MCNC: 4.5 G/DL (ref 3.5–5.2)
ALP SERPL-CCNC: 92 U/L (ref 40–129)
ALT SERPL W P-5'-P-CCNC: 29 U/L (ref 10–50)
ANION GAP SERPL CALCULATED.3IONS-SCNC: 9 MMOL/L (ref 7–15)
AST SERPL W P-5'-P-CCNC: 29 U/L (ref 10–50)
BILIRUB DIRECT SERPL-MCNC: <0.2 MG/DL (ref 0–0.3)
BILIRUB SERPL-MCNC: 0.6 MG/DL
BUN SERPL-MCNC: 19 MG/DL (ref 8–23)
CALCIUM SERPL-MCNC: 9.4 MG/DL (ref 8.8–10.2)
CHLORIDE SERPL-SCNC: 104 MMOL/L (ref 98–107)
CREAT SERPL-MCNC: 1.14 MG/DL (ref 0.67–1.17)
DEPRECATED HCO3 PLAS-SCNC: 27 MMOL/L (ref 22–29)
FERRITIN SERPL-MCNC: 1481 NG/ML (ref 31–409)
GFR SERPL CREATININE-BSD FRML MDRD: 69 ML/MIN/1.73M2
GLUCOSE SERPL-MCNC: 95 MG/DL (ref 70–99)
POTASSIUM SERPL-SCNC: 4.7 MMOL/L (ref 3.4–5.3)
PROT SERPL-MCNC: 7.9 G/DL (ref 6.4–8.3)
SODIUM SERPL-SCNC: 140 MMOL/L (ref 136–145)

## 2022-08-09 PROCEDURE — 0054A COVID-19,PF,PFIZER (12+ YRS): CPT | Performed by: STUDENT IN AN ORGANIZED HEALTH CARE EDUCATION/TRAINING PROGRAM

## 2022-08-09 PROCEDURE — 99214 OFFICE O/P EST MOD 30 MIN: CPT | Mod: 25 | Performed by: STUDENT IN AN ORGANIZED HEALTH CARE EDUCATION/TRAINING PROGRAM

## 2022-08-09 PROCEDURE — 36415 COLL VENOUS BLD VENIPUNCTURE: CPT | Performed by: STUDENT IN AN ORGANIZED HEALTH CARE EDUCATION/TRAINING PROGRAM

## 2022-08-09 PROCEDURE — 82306 VITAMIN D 25 HYDROXY: CPT | Performed by: STUDENT IN AN ORGANIZED HEALTH CARE EDUCATION/TRAINING PROGRAM

## 2022-08-09 PROCEDURE — 82248 BILIRUBIN DIRECT: CPT | Performed by: STUDENT IN AN ORGANIZED HEALTH CARE EDUCATION/TRAINING PROGRAM

## 2022-08-09 PROCEDURE — 80053 COMPREHEN METABOLIC PANEL: CPT | Performed by: STUDENT IN AN ORGANIZED HEALTH CARE EDUCATION/TRAINING PROGRAM

## 2022-08-09 PROCEDURE — 91305 COVID-19,PF,PFIZER (12+ YRS): CPT | Performed by: STUDENT IN AN ORGANIZED HEALTH CARE EDUCATION/TRAINING PROGRAM

## 2022-08-09 PROCEDURE — 82728 ASSAY OF FERRITIN: CPT | Performed by: STUDENT IN AN ORGANIZED HEALTH CARE EDUCATION/TRAINING PROGRAM

## 2022-08-09 RX ORDER — COLCHICINE 0.6 MG/1
TABLET ORAL
Qty: 30 TABLET | Refills: 5 | Status: SHIPPED | OUTPATIENT
Start: 2022-08-09 | End: 2023-12-22

## 2022-08-09 RX ORDER — TRIAMCINOLONE ACETONIDE 1 MG/G
CREAM TOPICAL 2 TIMES DAILY
Qty: 45 G | Refills: 1 | Status: SHIPPED | OUTPATIENT
Start: 2022-08-09 | End: 2023-12-22

## 2022-08-09 RX ORDER — ALLOPURINOL 300 MG/1
TABLET ORAL
Qty: 30 TABLET | Refills: 5 | Status: SHIPPED | OUTPATIENT
Start: 2022-08-09 | End: 2023-11-20

## 2022-08-09 RX ORDER — HYDROXYZINE PAMOATE 25 MG/1
25 CAPSULE ORAL 3 TIMES DAILY PRN
Qty: 90 CAPSULE | Refills: 1 | Status: SHIPPED | OUTPATIENT
Start: 2022-08-09 | End: 2023-12-22

## 2022-08-09 ASSESSMENT — ACTIVITIES OF DAILY LIVING (ADL)
CHANGE_IN_FUNCTIONAL_STATUS_SINCE_ONSET_OF_CURRENT_ILLNESS/INJURY: NO
CONCENTRATING,_REMEMBERING_OR_MAKING_DECISIONS_DIFFICULTY: NO
VISION_MANAGEMENT: READING
WERE_AUXILIARY_AIDS_OFFERED?: YES
USE_OF_HEARING_ASSISTIVE_DEVICES: RIGHT HEARING AID;LEFT HEARING AID
DIFFICULTY_EATING/SWALLOWING: NO
DOING_ERRANDS_INDEPENDENTLY_DIFFICULTY: NO
CURRENT_FUNCTION: NO ASSISTANCE NEEDED
DRESSING/BATHING_DIFFICULTY: NO
FALL_HISTORY_WITHIN_LAST_SIX_MONTHS: NO
WALKING_OR_CLIMBING_STAIRS_DIFFICULTY: NO
HEARING_DIFFICULTY_OR_DEAF: YES
TOILETING_ISSUES: NO
WEAR_GLASSES_OR_BLIND: YES
DIFFICULTY_COMMUNICATING: NO

## 2022-08-09 ASSESSMENT — PATIENT HEALTH QUESTIONNAIRE - PHQ9
SUM OF ALL RESPONSES TO PHQ QUESTIONS 1-9: 0
5. POOR APPETITE OR OVEREATING: NOT AT ALL

## 2022-08-09 ASSESSMENT — ANXIETY QUESTIONNAIRES
2. NOT BEING ABLE TO STOP OR CONTROL WORRYING: NOT AT ALL
7. FEELING AFRAID AS IF SOMETHING AWFUL MIGHT HAPPEN: NOT AT ALL
GAD7 TOTAL SCORE: 0
6. BECOMING EASILY ANNOYED OR IRRITABLE: NOT AT ALL
3. WORRYING TOO MUCH ABOUT DIFFERENT THINGS: NOT AT ALL
GAD7 TOTAL SCORE: 0
1. FEELING NERVOUS, ANXIOUS, OR ON EDGE: NOT AT ALL
5. BEING SO RESTLESS THAT IT IS HARD TO SIT STILL: NOT AT ALL
IF YOU CHECKED OFF ANY PROBLEMS ON THIS QUESTIONNAIRE, HOW DIFFICULT HAVE THESE PROBLEMS MADE IT FOR YOU TO DO YOUR WORK, TAKE CARE OF THINGS AT HOME, OR GET ALONG WITH OTHER PEOPLE: NOT DIFFICULT AT ALL

## 2022-08-09 ASSESSMENT — ENCOUNTER SYMPTOMS
CHILLS: 0
DIZZINESS: 0
HEARTBURN: 0
DIARRHEA: 0
MYALGIAS: 0
WEAKNESS: 0
SHORTNESS OF BREATH: 0
SORE THROAT: 0
FREQUENCY: 0
COUGH: 0
NERVOUS/ANXIOUS: 0
EYE PAIN: 0
HEMATURIA: 0
CONSTIPATION: 0
NAUSEA: 0
ARTHRALGIAS: 0
HEADACHES: 0
DYSURIA: 0
ABDOMINAL PAIN: 0
PALPITATIONS: 0
FEVER: 0
HEMATOCHEZIA: 0
JOINT SWELLING: 0
PARESTHESIAS: 0

## 2022-08-09 NOTE — PROGRESS NOTES
Nursing Notes:   SIMRAN LEWIS  8/9/2022  9:00 AM  Signed  Due to patient being non-English speaking/uses sign language, an  was used for this visit. Only for face-to-face interpretation by an external agency, date and length of interpretation can be found on the scanned worksheet.       name: Liang Ambrosio  Language: Rufina  Agency:  [employer]  Telephone number: [phone]  Type of interpretation:  Face-to-face, spoken        ASSESSMENT AND PLAN:      Cristina was seen today for physical and imm/inj.    Diagnoses and all orders for this visit:    Blurry vision  Itching  Allergic conjunctivitis, bilateral  Noting morning blurry vision with some itching and conjunctival erythema.  Has history of allergic conjunctivitis and I think that is what this is again.  Will treat with QT for drops, hydroxyzine for itching, and recommended that he return to his eye doctor.  It is possible he could be having systemic itching from elevated ferritin and he has known chronic kidney disease so we will recheck these levels today as well.  -     ketotifen (ZADITOR) 0.025 % ophthalmic solution; Place 1 drop into both eyes 2 times daily  -     hydrOXYzine (VISTARIL) 25 MG capsule; Take 1 capsule (25 mg) by mouth 3 times daily as needed for itching  -     triamcinolone (KENALOG) 0.1 % external cream; Apply topically 2 times daily  -     Ferritin; Future  -     Ferritin    Chronic gout of right foot, unspecified cause  Noting acute gout symptoms due to running out of his allopurinol and colchicine.  These prescriptions were resent.  His symptoms already started to improve.  We will recheck his renal function as he has had elevated creatinine in the past as well.  -     allopurinol (ZYLOPRIM) 300 MG tablet; TAKE 1 TABLET (300 MG) BY MOUTH DAILY/ TXHUA HNUB NOJ 1 LUB TSHUAJ PAB MOB KO TAW VWM  -     colchicine (COLCYRS) 0.6 MG tablet; TAKE 1 TABLET (0.6 MG) BY MOUTH DAILY/ TXHUA HNUB NOJ 1 LUB TSHUAJ PAB MOB KO TAW VWM  -      Basic metabolic panel; Future  -     Basic metabolic panel    Elevated ferritin  We will continue to monitor this.  -     Ferritin; Future  -     Hepatic function panel; Future  -     Hepatic function panel  -     Ferritin    Vitamin D deficiency  Patient had previously low vitamin D level and is unclear if he ever took the medication.  We will resend this.  -     Vitamin D deficiency screening; Future  -     Vitamin D deficiency screening    High priority for 2019-nCoV vaccine  Due for COVID-19 booster today.  This was given.  -     COVID-19,PF,PFIZER (12+ Yrs GRAY LABEL)    He will schedule follow-up Medicare wellness visit.    Patient Instructions   For the eyes:    Use the eye drops - one drop in each eye in the morning and evening for the next two week.     Schedule a follow up appointment with the eye doctor.         Restart medications:    Colchicine and allopurinol for gout - resent    Hydroxyzine for itching    Cream for ithcy area on skin and drops for itchy eyes.      COVID shot     Stop in lab for blood tests.        Cintia Ortiz MD    SUBJECTIVE  Cristina Lyles is a 71 year old male with past medical history significant for    Patient Active Problem List   Diagnosis     Anxiety state     Constipation     Depressive disorder, not elsewhere classified     Esophageal reflux     Major depressive disorder, recurrent episode (H)     Health Care Home     Gout     Rosacea     Conductive hearing loss, bilateral     Elevated ferritin     Others present at the visit:   Liang Ambrosio, present in person    Presents for   Chief Complaint   Patient presents with     Physical     Wellness 65+ physical     Imm/Inj     COVID-19 VACCINE     Patient presented today for a 65+ wellness visit, however when we begin the visit, he has multiple acute concerns, so visit was changed from a wellness visit to a acute care visit today.  He endorses doing poorly.  He has run out of his medications and recently had some  difficulty with gout pain in his foot.  He is noticing new and worsening blurry vision especially in the morning with some dizziness and lightheadedness, and is concerned about results from a visit with the specialist this spring.    1) gout.  He was previously taking allopurinol and colchicine.  He ran out of these medications about a month ago.  Started having difficulty with pain in his right great toe after this.  He was able to find some old medicine, which he thinks was the same pills and restarted this about 1 week ago.  Now his toe was feeling better and he denies any pain, wants to make sure he does not run out in the future, and that he has refills available on his medications.  Has been watching his diet somewhat as well.    2) he is noticing difficulties with itching.  This is present along his waist bilaterally, but worse on the right and extending down his leg.  There is no rash.  He has tried using the triamcinolone ointment and this has not been helpful.  Uses the cetirizine, but it is not stronger enough.  Is wondering if he can get a stronger medication.    3) Also notices difficulty with itching in his eyes.  He has blurry vision that is most prominent in the morning.  It lasts about 20 to 30 minutes and he sometimes feels lightheaded and dizzy with this.  Both eyes are affected.  He has no pain, he has no headache, he has no difficulty tracking, and does not have any difficulty later in the day.  Its not worse with position changes or sitting up.  No difficulty walking.  It does get better after he has breakfast in the morning.  He drinks only limited water, 1 bottle of water and a total of 2-3 bottles of fluids per day.  He saw the eye doctor last 1 year ago and was given reading glasses.  He says he has trouble with distance vision to but they did not give him glasses for that.  He does have medications for allergic conjunctivitis but he uses them only when his eyes feel itchy and most recently  "he has had more blurry vision and itching.    4) he has questions about his liver and recommendations from the hematology clinic.  Seen for evaluation of elevated ferritin.  They have said he is appropriate for continued follow-up at primary care and recommended yearly ferritin levels.  Also checked for TB, hepatitis C, hepatitis B and did a liver scan.  Liver ultrasound shows fatty liver but no evidence of cirrhosis.  Hepatitis C testing was negative, hepatitis B testing shows positive hepatitis B core antibody with hepatitis B surface antibody with a negative hepatitis B surface antigen.  This is consistent with previous infection that has been cleared, and his hepatitis B DNA is negative.  Indicates no active infection at this time.  He and I discussed this together and that he does not need to follow with a hepatitis specialist because he does not have hepatitis B.    Reviewed his entire medication list and updated and sent in medications as appropriate.      OBJECTIVE:  Vitals: /79   Pulse 68   Temp 98.2  F (36.8  C) (Oral)   Resp 20   Ht 1.59 m (5' 2.6\")   Wt 64.7 kg (142 lb 9.6 oz)   SpO2 99%   BMI 25.58 kg/m    BMI= Body mass index is 25.58 kg/m .  Objective:    Vitals:  Vitals are reviewed and are within the normal range  Gen:  Alert, pleasant, no acute distress  HEENT: Eyes show mild conjunctival erythema.  Normal eye movements.  Normal pupillary reflex, vision is intact.  Cardiac:  Regular rate and rhythm, no murmurs, rubs or gallops  Respiratory:  Lungs clear to auscultation bilaterally  Abdomen: Areas of excoriation around his waist but no rash, erythema or bumps present.  Belly is nontender with no hepatosplenomegaly.  Extremities: Hands bilateral lower extremities show no edema, no increased erythema and normal range of motion including in the great toe.    Results for orders placed or performed in visit on 08/09/22   Ferritin     Status: Abnormal   Result Value Ref Range    Ferritin " 1,481 (H) 31 - 409 ng/mL   Basic metabolic panel     Status: Normal   Result Value Ref Range    Creatinine 1.14 0.67 - 1.17 mg/dL    Sodium 140 136 - 145 mmol/L    Potassium 4.7 3.4 - 5.3 mmol/L    Urea Nitrogen 19.0 8.0 - 23.0 mg/dL    Chloride 104 98 - 107 mmol/L    Carbon Dioxide (CO2) 27 22 - 29 mmol/L    Anion Gap 9 7 - 15 mmol/L    Glucose 95 70 - 99 mg/dL    GFR Estimate 69 >60 mL/min/1.73m2    Calcium 9.4 8.8 - 10.2 mg/dL           Patient Instructions   For the eyes:    Use the eye drops - one drop in each eye in the morning and evening for the next two week.     Schedule a follow up appointment with the eye doctor.         Restart medications:    Colchicine and allopurinol for gout - resent    Hydroxyzine for itching    Cream for ithcy area on skin and drops for itchy eyes.      COVID shot     Stop in lab for blood tests.        Cintia Ortiz MD

## 2022-08-09 NOTE — NURSING NOTE
Due to patient being non-English speaking/uses sign language, an  was used for this visit. Only for face-to-face interpretation by an external agency, date and length of interpretation can be found on the scanned worksheet.       name: Liang Daily  Language: Rufina  Agency:  [employer]  Telephone number: [phone]  Type of interpretation:  Face-to-face, spoken

## 2022-08-09 NOTE — PATIENT INSTRUCTIONS
For the eyes:    Use the eye drops - one drop in each eye in the morning and evening for the next two week.     Schedule a follow up appointment with the eye doctor.         Restart medications:    Colchicine and allopurinol for gout - resent    Hydroxyzine for itching    Cream for ithcy area on skin and drops for itchy eyes.      COVID shot     Stop in lab for blood tests.

## 2022-08-09 NOTE — PROGRESS NOTES
"SUBJECTIVE:   Cristina Lyles is a 71 year old male who presents for Preventive Visit.    Patient has been advised of split billing requirements and indicates understanding: Yes  Visit was changed to an acute care visit today.  Responses provided before the visit by patient have been saved for use in future Medicare Wellness visit.        Social History     Tobacco Use     Smoking status: Never Smoker     Smokeless tobacco: Never Used   Substance Use Topics     Alcohol use: No       Alcohol Use 8/9/2022   Prescreen: >3 drinks/day or >7 drinks/week? No       Patient Care Team:  Cintia Ortiz MD as PCP - General  Cintia Ortiz MD as Assigned PCP  Tamime Zhang MD as MD (Hematology)  Ro Mckinnon APRN CNP as Assigned Cancer Care Provider    The following health maintenance items are reviewed in Epic and correct as of today:  Health Maintenance Due   Topic Date Due     ADVANCE CARE PLANNING  Never done     ZOSTER IMMUNIZATION (1 of 2) Never done     COLORECTAL CANCER SCREENING  04/19/2020     MEDICARE ANNUAL WELLNESS VISIT  04/02/2022     FALL RISK ASSESSMENT  04/02/2022     COVID-19 Vaccine (4 - Booster for Pfizer series) 04/03/2022     PHQ-9  04/19/2022       Identified Health Risks:  Answers for HPI/ROS submitted by the patient on 8/9/2022  In general, how would you rate your overall physical health?: fair  Frequency of exercise:: 2-3 days/week  Do you usually eat at least 4 servings of fruit and vegetables a day, include whole grains & fiber, and avoid regularly eating high fat or \"junk\" foods? : Yes  Taking medications regularly:: Yes  Medication side effects:: None  Activities of Daily Living: no assistance needed  Home safety: no safety concerns identified  Hearing Impairment:: no hearing concerns  In the past 6 months, have you been bothered by leaking of urine?: No  In general, how would you rate your overall mental or emotional health?: fair  Additional concerns today:: No  Duration of " exercise:: 15-30 minutes

## 2022-08-09 NOTE — LETTER
August 11, 2022       Cristina Lyles  [address] [facility]  [place] [address]        Dear ,    We are writing to inform you of your test results.    Your lab results came back stable.  Vitamin D level is good.  Liver tests are normal.  Kidney tests are stable.  Your ferritin is still high, but we will continue to follow this and monitor.  I look forward to seeing you soon for your 65+ wellness check.  Please call the clinic at [phone] if you have any questions.         Resulted Orders   Hepatic function panel   Result Value Ref Range    Protein Total 7.9 6.4 - 8.3 g/dL    Albumin 4.5 3.5 - 5.2 g/dL    Bilirubin Total 0.6 <=1.2 mg/dL    Alkaline Phosphatase 92 40 - 129 U/L    AST 29 10 - 50 U/L    ALT 29 10 - 50 U/L    Bilirubin Direct <0.20 0.00 - 0.30 mg/dL   Vitamin D deficiency screening   Result Value Ref Range    Vitamin D, Total (25-Hydroxy) 27 20 - 75 ug/L    Narrative    Season, race, dietary intake, and treatment affect the concentration of 25-hydroxy-Vitamin D. Values may decrease during winter months and increase during summer months. Values 20-29 ug/L may indicate Vitamin D insufficiency and values <20 ug/L may indicate Vitamin D deficiency.    Vitamin D determination is routinely performed by an immunoassay specific for 25 hydroxyvitamin D3.  If an individual is on vitamin D2(ergocalciferol) supplementation, please specify 25 OH vitamin D2 and D3 level determination by LCMSMS test VITD23.     Ferritin   Result Value Ref Range    Ferritin 1,481 (H) 31 - 409 ng/mL   Basic metabolic panel   Result Value Ref Range    Creatinine 1.14 0.67 - 1.17 mg/dL    Sodium 140 136 - 145 mmol/L    Potassium 4.7 3.4 - 5.3 mmol/L    Urea Nitrogen 19.0 8.0 - 23.0 mg/dL    Chloride 104 98 - 107 mmol/L    Carbon Dioxide (CO2) 27 22 - 29 mmol/L    Anion Gap 9 7 - 15 mmol/L    Glucose 95 70 - 99 mg/dL    GFR Estimate 69 >60 mL/min/1.73m2      Comment:      Effective December 21, 2021 eGFRcr in adults is calculated using  the 2021 CKD-EPI creatinine equation which includes age and gender (Faustina et al., NEJM, DOI: 10.1056/KDIMcd5266798)    Calcium 9.4 8.8 - 10.2 mg/dL       If you have any questions or concerns, please call the clinic at the number listed above.       Sincerely,      Cintia Ortiz MD

## 2022-08-10 LAB — DEPRECATED CALCIDIOL+CALCIFEROL SERPL-MC: 27 UG/L (ref 20–75)

## 2022-08-11 NOTE — RESULT ENCOUNTER NOTE
Cristina Lyles-    Your lab results came back stable.  Vitamin D level is good.  Liver tests are normal.  Kidney tests are stable.  Your ferritin is still high, but we will continue to follow this and monitor.  I look forward to seeing you soon for your 65+ wellness check.  Please call the clinic at [phone] if you have any questions.      Cintia Ortiz MD    Please have [facility]  or staff call patient with results.

## 2022-09-08 ENCOUNTER — HOSPITAL ENCOUNTER (EMERGENCY)
Facility: HOSPITAL | Age: 72
Discharge: LEFT BEFORE DISCHARGE | End: 2022-09-08
Payer: COMMERCIAL

## 2022-09-08 VITALS
DIASTOLIC BLOOD PRESSURE: 75 MMHG | TEMPERATURE: 98.2 F | OXYGEN SATURATION: 100 % | HEART RATE: 83 BPM | WEIGHT: 130 LBS | SYSTOLIC BLOOD PRESSURE: 157 MMHG | BODY MASS INDEX: 23.32 KG/M2 | RESPIRATION RATE: 20 BRPM

## 2022-09-09 NOTE — ED TRIAGE NOTES
2 days of body hives/itching; taking benadryl but rash is still getting worse. NKA to anything; this has never happened before. No airway/throat/mouth issues.      Triage Assessment     Row Name 09/08/22 1955       Triage Assessment (Adult)    Airway WDL WDL       Respiratory WDL    Respiratory WDL WDL       Skin Circulation/Temperature WDL    Skin Circulation/Temperature WDL X  hives on trunk       Cardiac WDL    Cardiac WDL WDL       Peripheral/Neurovascular WDL    Peripheral Neurovascular WDL WDL       Cognitive/Neuro/Behavioral WDL    Cognitive/Neuro/Behavioral WDL WDL

## 2022-11-30 ENCOUNTER — OFFICE VISIT (OUTPATIENT)
Dept: FAMILY MEDICINE | Facility: CLINIC | Age: 72
End: 2022-11-30
Payer: COMMERCIAL

## 2022-11-30 VITALS
SYSTOLIC BLOOD PRESSURE: 120 MMHG | HEIGHT: 63 IN | TEMPERATURE: 98.2 F | OXYGEN SATURATION: 100 % | HEART RATE: 76 BPM | WEIGHT: 144.6 LBS | RESPIRATION RATE: 20 BRPM | BODY MASS INDEX: 25.62 KG/M2 | DIASTOLIC BLOOD PRESSURE: 72 MMHG

## 2022-11-30 DIAGNOSIS — H43.391 VITREOUS FLOATERS OF RIGHT EYE: Primary | ICD-10-CM

## 2022-11-30 DIAGNOSIS — K21.9 GASTROESOPHAGEAL REFLUX DISEASE WITHOUT ESOPHAGITIS: ICD-10-CM

## 2022-11-30 DIAGNOSIS — Z23 NEED FOR PROPHYLACTIC VACCINATION AND INOCULATION AGAINST INFLUENZA: ICD-10-CM

## 2022-11-30 DIAGNOSIS — R21 RASH: ICD-10-CM

## 2022-11-30 DIAGNOSIS — J30.89 SEASONAL ALLERGIC RHINITIS DUE TO OTHER ALLERGIC TRIGGER: ICD-10-CM

## 2022-11-30 DIAGNOSIS — R07.89 CHEST WALL PAIN: ICD-10-CM

## 2022-11-30 DIAGNOSIS — H10.13 ALLERGIC CONJUNCTIVITIS, BILATERAL: ICD-10-CM

## 2022-11-30 PROCEDURE — 90662 IIV NO PRSV INCREASED AG IM: CPT | Performed by: STUDENT IN AN ORGANIZED HEALTH CARE EDUCATION/TRAINING PROGRAM

## 2022-11-30 PROCEDURE — 99214 OFFICE O/P EST MOD 30 MIN: CPT | Mod: 25 | Performed by: STUDENT IN AN ORGANIZED HEALTH CARE EDUCATION/TRAINING PROGRAM

## 2022-11-30 PROCEDURE — 90471 IMMUNIZATION ADMIN: CPT | Performed by: STUDENT IN AN ORGANIZED HEALTH CARE EDUCATION/TRAINING PROGRAM

## 2022-11-30 RX ORDER — TRIAMCINOLONE ACETONIDE 1 MG/G
CREAM TOPICAL 2 TIMES DAILY
Qty: 45 G | Refills: 1 | Status: SHIPPED | OUTPATIENT
Start: 2022-11-30 | End: 2023-12-22

## 2022-11-30 RX ORDER — FAMOTIDINE 20 MG/1
20 TABLET, FILM COATED ORAL
Qty: 30 TABLET | Refills: 3 | Status: SHIPPED | OUTPATIENT
Start: 2022-11-30 | End: 2023-03-10

## 2022-11-30 RX ORDER — POLYVINYL ALCOHOL, POVIDONE .5; .6 G/100ML; G/100ML
1 LIQUID OPHTHALMIC 3 TIMES DAILY PRN
Qty: 15 ML | Refills: 0 | Status: SHIPPED | OUTPATIENT
Start: 2022-11-30 | End: 2023-12-22

## 2022-11-30 RX ORDER — ACETAMINOPHEN 500 MG
500-1000 TABLET ORAL EVERY 6 HOURS PRN
Qty: 100 TABLET | Refills: 0 | Status: SHIPPED | OUTPATIENT
Start: 2022-11-30 | End: 2023-11-20

## 2022-11-30 RX ORDER — CETIRIZINE HYDROCHLORIDE 10 MG/1
10 TABLET ORAL DAILY
Qty: 30 TABLET | Refills: 5 | Status: SHIPPED | OUTPATIENT
Start: 2022-11-30 | End: 2023-12-22

## 2022-11-30 RX ORDER — PREDNISONE 20 MG/1
TABLET ORAL
Qty: 20 TABLET | Refills: 0 | Status: SHIPPED | OUTPATIENT
Start: 2022-11-30 | End: 2023-11-20

## 2022-11-30 ASSESSMENT — PATIENT HEALTH QUESTIONNAIRE - PHQ9
5. POOR APPETITE OR OVEREATING: NOT AT ALL
SUM OF ALL RESPONSES TO PHQ QUESTIONS 1-9: 0

## 2022-11-30 ASSESSMENT — ACTIVITIES OF DAILY LIVING (ADL)
CONCENTRATING,_REMEMBERING_OR_MAKING_DECISIONS_DIFFICULTY: NO
WALKING_OR_CLIMBING_STAIRS_DIFFICULTY: NO
DIFFICULTY_COMMUNICATING: NO
WEAR_GLASSES_OR_BLIND: YES
DIFFICULTY_EATING/SWALLOWING: NO
FALL_HISTORY_WITHIN_LAST_SIX_MONTHS: NO
DRESSING/BATHING_DIFFICULTY: NO
CHANGE_IN_FUNCTIONAL_STATUS_SINCE_ONSET_OF_CURRENT_ILLNESS/INJURY: NO
DOING_ERRANDS_INDEPENDENTLY_DIFFICULTY: NO
DESCRIBE_HEARING_LOSS: HEARING LOSS ON RIGHT SIDE
TOILETING_ISSUES: NO
VISION_MANAGEMENT: SOMETIME
HEARING_DIFFICULTY_OR_DEAF: YES

## 2022-11-30 ASSESSMENT — ANXIETY QUESTIONNAIRES
2. NOT BEING ABLE TO STOP OR CONTROL WORRYING: NOT AT ALL
5. BEING SO RESTLESS THAT IT IS HARD TO SIT STILL: NOT AT ALL
GAD7 TOTAL SCORE: 0
7. FEELING AFRAID AS IF SOMETHING AWFUL MIGHT HAPPEN: NOT AT ALL
3. WORRYING TOO MUCH ABOUT DIFFERENT THINGS: NOT AT ALL
6. BECOMING EASILY ANNOYED OR IRRITABLE: NOT AT ALL
1. FEELING NERVOUS, ANXIOUS, OR ON EDGE: NOT AT ALL
GAD7 TOTAL SCORE: 0
IF YOU CHECKED OFF ANY PROBLEMS ON THIS QUESTIONNAIRE, HOW DIFFICULT HAVE THESE PROBLEMS MADE IT FOR YOU TO DO YOUR WORK, TAKE CARE OF THINGS AT HOME, OR GET ALONG WITH OTHER PEOPLE: NOT DIFFICULT AT ALL

## 2022-11-30 NOTE — PROGRESS NOTES
Nursing Notes:   SIMRAN LEWIS  11/30/2022  9:14 AM  Signed  Due to patient being non-English speaking/uses sign language, an  was used for this visit. Only for face-to-face interpretation by an external agency, date and length of interpretation can be found on the scanned worksheet.       name: Berhane Sorenson  Language: Rufina  Agency: [employer]  Telephone number: [phone]  Type of interpretation:  Face-to-face, Spoken        ASSESSMENT AND PLAN:      Cristina was seen today for eye problem and imm/inj.    Diagnoses and all orders for this visit:    Vitreous floaters of right eye  Examination of the eye today shows some erythema, discoloration the medial side of the eye but no evidence of abrasion or foreign body present.  His vision is okay although some blurring where he endorses the presence of a floater in the middle portion of his eye.  Likely secondary to this trauma that happened 2 weeks ago.  We will place ophthalmology appointment and order some artificial tears that he can use to help with symptoms.  -     Adult Eye  Referral; Future  -     Polyvinyl Alcohol-Povidone (ARTIFICIAL TEARS) 5-6 MG/ML SOLN; Apply 1 drop to eye 3 times daily as needed (dry eyes)    Need for prophylactic vaccination and inoculation against influenza  -     INFLUENZA, QUAD, HIGH DOSE, PF, 65YR + (FLUZONE HD)    Allergic conjunctivitis, bilateral  Refilled his allergy eyedrops.  -     ketotifen (ZADITOR) 0.025 % ophthalmic solution; Place 1 drop into both eyes 2 times daily  -     Polyvinyl Alcohol-Povidone (ARTIFICIAL TEARS) 5-6 MG/ML SOLN; Apply 1 drop to eye 3 times daily as needed (dry eyes)    Seasonal allergic rhinitis due to other allergic trigger  Rash  Discussed appropriate use of his triamcinolone and cetirizine as first-line for allergic responses, and use prednisone only with severe symptoms, or for gout flares.  He voiced understanding.  He continues to take his gout medication regularly.  -      cetirizine (ZYRTEC) 10 MG tablet; Take 1 tablet (10 mg) by mouth daily  -     predniSONE (DELTASONE) 20 MG tablet; Take 3 pills for 3 days, then 2 pills for 3 days, then 1 pill for 3 days for gout flares.  Can take 1 pill for severe itching as needed.  -     triamcinolone (KENALOG) 0.1 % external cream; Apply topically 2 times daily    Gastroesophageal reflux disease without esophagitis  We will treat his heartburn with famotidine.  -     famotidine (PEPCID) 20 MG tablet; Take 1 tablet (20 mg) by mouth nightly as needed (heartburn)    Chest wall pain  Pain is reproducible on palpation, is atypical in nature, and I think likely secondary to muscle strain from the shoveling.  It was not associated with the physical activity itself.  We will treat with Tylenol and topical Voltaren.  He has chronic kidney disease so should avoid NSAIDs.  -     diclofenac (VOLTAREN) 1 % topical gel; Apply 2 g topically 3 times daily as needed for moderate pain (4-6) Apply to areas with pain- chest, knees, back as needed.  -     acetaminophen (TYLENOL) 500 MG tablet; Take 1-2 tablets (500-1,000 mg) by mouth every 6 hours as needed for mild pain        Patient Instructions   Schedule an eye doctor follow up for the eye.     Use the artificial tears up to 4x per day as needed.   Use the anti-histamine (anti-itch drops) once daily.      For rash:  Refilled ceterizine.  Use this one first!  Refilled triamcinolone cream.  Use up to 3x per day.  Use this first!  Refilled the prednisone.  Only use if bad for 3 days.     For chest pain:  Voltaren cream for ribs  Prescription for tylenol  Prescription for pepcid/famotidine for the acid reflux.        11/30/22   EYE REFERRAL   (Medical insurance not accepted [facility] through [facility])    [facility]  Phone:[phone]  Fax:  [phone]    [facility]    [address] [place] [address]  [id number]    Appointment:   12/05/22  Arrival Time:  1:00pm    Please bring a copy of your insurance card and photo ID    If you cannot make this appointment, please contact [phone] to reschedule    Faxed demographics and referral to [phone]. They will contact pt to schedule.    Betzaida Ortiz MD    KASHIF Lyles is a 72 year old male with past medical history significant for    Patient Active Problem List   Diagnosis     Anxiety state     Constipation     Depressive disorder, not elsewhere classified     Esophageal reflux     Major depressive disorder, recurrent episode (H)     Health Care Home     Gout     Rosacea     Conductive hearing loss, bilateral     Elevated ferritin     Others present at the visit:   Berhane Sorenson, present in person    Presents for   Chief Complaint   Patient presents with     Eye Problem     2 weeks ago hit his eye by the door and he started to seeing floater.  Refill on prednison, cetirizine and triamic, tight chest.  Decline 5 booster pfizer     Imm/Inj     Flu Shot     Patient presents today for evaluation of a couple different concerns.    First concern is changes in vision in his right eye.  He describes an episode about 2 weeks ago, where he was leaning over to do some work and an object poked him in the eye along the medial side of his right eye.  Since then he has noticed black floaters, the floaters make his vision blurry, he is having some difficulty seeing because of this.  He denies any pain, no difficulty with eye movement, has had some increased tearing.  No trouble with his left eye.  He is also had difficulties with itching around his eye, but is taking medications for this and that is better.    He also was requesting refills of medications.  He has had difficulty with recurrent rash.  Was seen at an outside clinic in August and was given cetirizine, prednisone, and triamcinolone cream.  He has been using all 3, but uses the prednisone first as its most  "effective.  Finds the cetirizine less effective and is worried about using too much of the creams.  We discussed changing that order and he is agreeable to this.    Finally he endorses chest pain.  He describes this as a tightness in his chest that happened yesterday after shoveling.  The pain did not occur until after he was done shoveling, when he was sitting down and at rest.  The pain is located in his lower chest on both sides and it hurts more with movement.  Twisting and turning creates a sharp pain.  He feels tightness that extends around into his back.  No shortness of breath, diaphoresis or radiation to the neck or arms.  He has also noticed increased heartburn over the last few weeks.  This is particularly bad when he eats hot peppers or spicy food.  He notices a tightness in his throat that happens with taking most food.    He denies any palpitations no lower extremity edema no dizziness or lightheadedness.      OBJECTIVE:  Vitals: /72   Pulse 76   Temp 98.2  F (36.8  C) (Oral)   Resp 20   Ht 1.593 m (5' 2.7\")   Wt 65.6 kg (144 lb 9.6 oz)   SpO2 100%   BMI 25.86 kg/m    BMI= Body mass index is 25.86 kg/m .  Objective:    Vitals:  Vitals are reviewed and are within the normal range  Gen:  Alert, pleasant, no acute distress  HEENT: He has some conjunctival erythema on the right side.  There is a darkening in the medial portion of the eye, that is asymmetric with the left side.  His vision is intact, and he has no pain with eye movements.  I completed a fluorescein exam which was negative for any particles or abrasions present.  Cardiac:  Regular rate and rhythm, no murmurs, rubs or gallops  Respiratory:  Lungs clear to auscultation bilaterally  Extremities:  Warm, well-perfused, pulses 2+/4, no lower extremity edema  Skin: He does have some areas of rash and irritation on his hands and arms.    No results found for any visits on 11/30/22.        Patient Instructions   Schedule an eye doctor " follow up for the eye.     Use the artificial tears up to 4x per day as needed.   Use the anti-histamine (anti-itch drops) once daily.      For rash:  Refilled ceterizine.  Use this one first!  Refilled triamcinolone cream.  Use up to 3x per day.  Use this first!  Refilled the prednisone.  Only use if bad for 3 days.     For chest pain:  Voltaren cream for ribs  Prescription for tylenol  Prescription for pepcid/famotidine for the acid reflux.                        11/30/22   EYE REFERRAL   (Medical insurance not accepted [facility] through [facility])    [facility]  Phone:[phone]  Fax:  [phone]    [facility]    [address] [place] [address]    Appointment:  12/05/22  Arrival Time:  1:00pm    Please bring a copy of your insurance card and photo ID    If you cannot make this appointment, please contact [phone] to reschedule    Faxed demographics and referral to [phone]. They will contact pt to schedule.    Betzaida Ortiz MD

## 2022-11-30 NOTE — PATIENT INSTRUCTIONS
Schedule an eye doctor follow up for the eye.     Use the artificial tears up to 4x per day as needed.   Use the anti-histamine (anti-itch drops) once daily.      For rash:  Refilled ceterizine.  Use this one first!  Refilled triamcinolone cream.  Use up to 3x per day.  Use this first!  Refilled the prednisone.  Only use if bad for 3 days.     For chest pain:  Voltaren cream for ribs  Prescription for tylenol  Prescription for pepcid/famotidine for the acid reflux.                        11/30/22   EYE REFERRAL   (Medical insurance not accepted [facility] through [facility])    [facility]  Phone:[phone]  Fax:  [phone]    [facility]    [address] [place] [address]    Appointment:  12/05/22  Arrival Time:  1:00pm    Please bring a copy of your insurance card and photo ID    If you cannot make this appointment, please contact [phone] to reschedule    Faxed demographics and referral to [phone]. They will contact pt to schedule.    Betzaida Isabel

## 2022-11-30 NOTE — NURSING NOTE
Due to patient being non-English speaking/uses sign language, an  was used for this visit. Only for face-to-face interpretation by an external agency, date and length of interpretation can be found on the scanned worksheet.       name: Berhane Sorenson  Language: Rufina  Agency: [employer]  Telephone number: [phone]  Type of interpretation:  Face-to-face, Spoken

## 2022-12-09 ENCOUNTER — TELEPHONE (OUTPATIENT)
Dept: OPHTHALMOLOGY | Facility: CLINIC | Age: 72
End: 2022-12-09

## 2022-12-09 NOTE — TELEPHONE ENCOUNTER
Spoke to patient with  at 1520    One month history of dark spots in vision-- thin floater noticed in peripheral vision    No vision changes noted  No flashing    [facility]    After review pt has hard time with transportation to [place].    Pt previously seen on [facility] av in [place].    Pt will ask son to call triage line on Monday to review scheduling options for next week.    Joshua Hicks RN 3:44 PM 12/09/22

## 2022-12-09 NOTE — TELEPHONE ENCOUNTER
This encounter is being sent to inform the clinic that this patient has a referral from Cintia Ortiz MD for the diagnoses of  Vitreous floaters of right eye and has requested that this patient be seen within 3-5 days.  Based on the availability of our provider(s), we are unable to accommodate this request.      Were all sites offered this patient?  Yes      Does scheduling algorithm request to schedule next available?  Patient appointment has not been scheduled.  Please review the referral request for accommodation and contact the patient.  If unable to accommodate, please resubmit a referral and indicate a preferred partner or affiliate location using Provider Finder or Scheduling Instructions field.

## 2022-12-12 ENCOUNTER — TELEPHONE (OUTPATIENT)
Dept: OPHTHALMOLOGY | Facility: CLINIC | Age: 72
End: 2022-12-12

## 2022-12-12 NOTE — TELEPHONE ENCOUNTER
Calls and spoke to son.     He will call and make an appointment for his mom when he can find a ride.     He has the triage number     Belem Mason Communication Facilitator on 12/12/2022 at 1:11 PM

## 2023-02-17 ENCOUNTER — HOSPITAL ENCOUNTER (EMERGENCY)
Facility: HOSPITAL | Age: 73
Discharge: HOME | End: 2023-02-17
Attending: FAMILY MEDICINE | Admitting: FAMILY MEDICINE
Payer: COMMERCIAL

## 2023-02-17 ENCOUNTER — APPOINTMENT (OUTPATIENT)
Dept: RADIOLOGY | Facility: HOSPITAL | Age: 73
End: 2023-02-17
Attending: FAMILY MEDICINE
Payer: COMMERCIAL

## 2023-02-17 ENCOUNTER — APPOINTMENT (OUTPATIENT)
Dept: CT IMAGING | Facility: HOSPITAL | Age: 73
End: 2023-02-17
Attending: FAMILY MEDICINE
Payer: COMMERCIAL

## 2023-02-17 VITALS
SYSTOLIC BLOOD PRESSURE: 128 MMHG | OXYGEN SATURATION: 97 % | TEMPERATURE: 98.7 F | DIASTOLIC BLOOD PRESSURE: 74 MMHG | RESPIRATION RATE: 20 BRPM | WEIGHT: 145.7 LBS | BODY MASS INDEX: 26.06 KG/M2 | HEART RATE: 113 BPM

## 2023-02-17 DIAGNOSIS — N30.00 ACUTE CYSTITIS WITHOUT HEMATURIA: ICD-10-CM

## 2023-02-17 DIAGNOSIS — J20.9 ACUTE BRONCHITIS, UNSPECIFIED ORGANISM: ICD-10-CM

## 2023-02-17 LAB
ALBUMIN UR-MCNC: 30 MG/DL
ANION GAP SERPL CALCULATED.3IONS-SCNC: 11 MMOL/L (ref 7–15)
APPEARANCE UR: ABNORMAL
BACTERIA #/AREA URNS HPF: ABNORMAL /HPF
BASE EXCESS BLDV CALC-SCNC: 3.8 MMOL/L
BASOPHILS # BLD AUTO: 0 10E3/UL (ref 0–0.2)
BASOPHILS NFR BLD AUTO: 0 %
BILIRUB UR QL STRIP: NEGATIVE
BUN SERPL-MCNC: 15 MG/DL (ref 8–23)
CALCIUM SERPL-MCNC: 9.3 MG/DL (ref 8.8–10.2)
CHLORIDE SERPL-SCNC: 102 MMOL/L (ref 98–107)
COLOR UR AUTO: YELLOW
CREAT SERPL-MCNC: 1.22 MG/DL (ref 0.67–1.17)
DEPRECATED HCO3 PLAS-SCNC: 26 MMOL/L (ref 22–29)
EOSINOPHIL # BLD AUTO: 0.1 10E3/UL (ref 0–0.7)
EOSINOPHIL NFR BLD AUTO: 1 %
ERYTHROCYTE [DISTWIDTH] IN BLOOD BY AUTOMATED COUNT: 13.1 % (ref 10–15)
FLUAV RNA SPEC QL NAA+PROBE: NEGATIVE
FLUBV RNA RESP QL NAA+PROBE: NEGATIVE
GFR SERPL CREATININE-BSD FRML MDRD: 63 ML/MIN/1.73M2
GLUCOSE SERPL-MCNC: 114 MG/DL (ref 70–99)
GLUCOSE UR STRIP-MCNC: NEGATIVE MG/DL
HCO3 BLDV-SCNC: 29 MMOL/L (ref 24–30)
HCT VFR BLD AUTO: 54.5 % (ref 40–53)
HGB BLD-MCNC: 17.8 G/DL (ref 13.3–17.7)
HGB UR QL STRIP: ABNORMAL
IMM GRANULOCYTES # BLD: 0 10E3/UL
IMM GRANULOCYTES NFR BLD: 0 %
KETONES UR STRIP-MCNC: NEGATIVE MG/DL
LEUKOCYTE ESTERASE UR QL STRIP: ABNORMAL
LYMPHOCYTES # BLD AUTO: 2.1 10E3/UL (ref 0.8–5.3)
LYMPHOCYTES NFR BLD AUTO: 17 %
MAGNESIUM SERPL-MCNC: 2.2 MG/DL (ref 1.7–2.3)
MCH RBC QN AUTO: 28.6 PG (ref 26.5–33)
MCHC RBC AUTO-ENTMCNC: 32.7 G/DL (ref 31.5–36.5)
MCV RBC AUTO: 88 FL (ref 78–100)
MONOCYTES # BLD AUTO: 1 10E3/UL (ref 0–1.3)
MONOCYTES NFR BLD AUTO: 8 %
MUCOUS THREADS #/AREA URNS LPF: PRESENT /LPF
NEUTROPHILS # BLD AUTO: 9.2 10E3/UL (ref 1.6–8.3)
NEUTROPHILS NFR BLD AUTO: 74 %
NITRATE UR QL: NEGATIVE
NRBC # BLD AUTO: 0 10E3/UL
NRBC BLD AUTO-RTO: 0 /100
NT-PROBNP SERPL-MCNC: <36 PG/ML (ref 0–900)
OXYHGB MFR BLDV: 53.3 % (ref 70–75)
PCO2 BLDV: 52 MM HG (ref 35–50)
PH BLDV: 7.36 [PH] (ref 7.35–7.45)
PH UR STRIP: 6 [PH] (ref 5–7)
PLATELET # BLD AUTO: 155 10E3/UL (ref 150–450)
PO2 BLDV: 28 MM HG (ref 25–47)
POTASSIUM SERPL-SCNC: 4.1 MMOL/L (ref 3.4–5.3)
RBC # BLD AUTO: 6.22 10E6/UL (ref 4.4–5.9)
RBC URINE: 7 /HPF
RSV RNA SPEC NAA+PROBE: NEGATIVE
SAO2 % BLDV: 54.4 % (ref 70–75)
SARS-COV-2 RNA RESP QL NAA+PROBE: NEGATIVE
SODIUM SERPL-SCNC: 139 MMOL/L (ref 136–145)
SP GR UR STRIP: 1.02 (ref 1–1.03)
SQUAMOUS EPITHELIAL: 2 /HPF
TROPONIN T SERPL HS-MCNC: <6 NG/L
UROBILINOGEN UR STRIP-MCNC: <2 MG/DL
WBC # BLD AUTO: 12.4 10E3/UL (ref 4–11)
WBC URINE: 36 /HPF

## 2023-02-17 PROCEDURE — 36415 COLL VENOUS BLD VENIPUNCTURE: CPT | Performed by: FAMILY MEDICINE

## 2023-02-17 PROCEDURE — 94640 AIRWAY INHALATION TREATMENT: CPT

## 2023-02-17 PROCEDURE — 81001 URINALYSIS AUTO W/SCOPE: CPT | Performed by: EMERGENCY MEDICINE

## 2023-02-17 PROCEDURE — 999N000157 HC STATISTIC RCP TIME EA 10 MIN

## 2023-02-17 PROCEDURE — C9803 HOPD COVID-19 SPEC COLLECT: HCPCS

## 2023-02-17 PROCEDURE — 87086 URINE CULTURE/COLONY COUNT: CPT | Performed by: EMERGENCY MEDICINE

## 2023-02-17 PROCEDURE — 80048 BASIC METABOLIC PNL TOTAL CA: CPT | Performed by: EMERGENCY MEDICINE

## 2023-02-17 PROCEDURE — 258N000003 HC RX IP 258 OP 636: Performed by: FAMILY MEDICINE

## 2023-02-17 PROCEDURE — 93005 ELECTROCARDIOGRAM TRACING: CPT | Performed by: EMERGENCY MEDICINE

## 2023-02-17 PROCEDURE — 71275 CT ANGIOGRAPHY CHEST: CPT

## 2023-02-17 PROCEDURE — 84484 ASSAY OF TROPONIN QUANT: CPT | Performed by: EMERGENCY MEDICINE

## 2023-02-17 PROCEDURE — 71046 X-RAY EXAM CHEST 2 VIEWS: CPT

## 2023-02-17 PROCEDURE — 96360 HYDRATION IV INFUSION INIT: CPT | Mod: 59

## 2023-02-17 PROCEDURE — 85014 HEMATOCRIT: CPT | Performed by: EMERGENCY MEDICINE

## 2023-02-17 PROCEDURE — 250N000013 HC RX MED GY IP 250 OP 250 PS 637: Performed by: FAMILY MEDICINE

## 2023-02-17 PROCEDURE — 250N000009 HC RX 250: Performed by: FAMILY MEDICINE

## 2023-02-17 PROCEDURE — 250N000011 HC RX IP 250 OP 636: Performed by: FAMILY MEDICINE

## 2023-02-17 PROCEDURE — 99285 EMERGENCY DEPT VISIT HI MDM: CPT | Mod: CS,25

## 2023-02-17 PROCEDURE — 83735 ASSAY OF MAGNESIUM: CPT | Performed by: FAMILY MEDICINE

## 2023-02-17 PROCEDURE — 94640 AIRWAY INHALATION TREATMENT: CPT | Mod: 76

## 2023-02-17 PROCEDURE — 87637 SARSCOV2&INF A&B&RSV AMP PRB: CPT | Performed by: EMERGENCY MEDICINE

## 2023-02-17 PROCEDURE — 83880 ASSAY OF NATRIURETIC PEPTIDE: CPT | Performed by: FAMILY MEDICINE

## 2023-02-17 PROCEDURE — 85041 AUTOMATED RBC COUNT: CPT | Performed by: EMERGENCY MEDICINE

## 2023-02-17 PROCEDURE — 82805 BLOOD GASES W/O2 SATURATION: CPT | Performed by: FAMILY MEDICINE

## 2023-02-17 PROCEDURE — 250N000012 HC RX MED GY IP 250 OP 636 PS 637: Performed by: FAMILY MEDICINE

## 2023-02-17 RX ORDER — LEVOFLOXACIN 750 MG/1
750 TABLET, FILM COATED ORAL DAILY
Qty: 6 TABLET | Refills: 0 | Status: SHIPPED | OUTPATIENT
Start: 2023-02-18 | End: 2023-04-14

## 2023-02-17 RX ORDER — PREDNISONE 20 MG/1
20 TABLET ORAL DAILY
Qty: 4 TABLET | Refills: 0 | Status: SHIPPED | OUTPATIENT
Start: 2023-02-17 | End: 2023-02-21

## 2023-02-17 RX ORDER — ALBUTEROL SULFATE 0.83 MG/ML
5 SOLUTION RESPIRATORY (INHALATION) ONCE
Status: COMPLETED | OUTPATIENT
Start: 2023-02-17 | End: 2023-02-17

## 2023-02-17 RX ORDER — PREDNISONE 20 MG/1
40 TABLET ORAL ONCE
Status: COMPLETED | OUTPATIENT
Start: 2023-02-17 | End: 2023-02-17

## 2023-02-17 RX ORDER — ACETAMINOPHEN 325 MG/1
650 TABLET ORAL ONCE
Status: COMPLETED | OUTPATIENT
Start: 2023-02-17 | End: 2023-02-17

## 2023-02-17 RX ORDER — LEVOFLOXACIN 750 MG/1
750 TABLET, FILM COATED ORAL ONCE
Status: COMPLETED | OUTPATIENT
Start: 2023-02-17 | End: 2023-02-17

## 2023-02-17 RX ORDER — IPRATROPIUM BROMIDE AND ALBUTEROL SULFATE 2.5; .5 MG/3ML; MG/3ML
3 SOLUTION RESPIRATORY (INHALATION) ONCE
Status: COMPLETED | OUTPATIENT
Start: 2023-02-17 | End: 2023-02-17

## 2023-02-17 RX ORDER — IOPAMIDOL 755 MG/ML
100 INJECTION, SOLUTION INTRAVASCULAR ONCE
Status: COMPLETED | OUTPATIENT
Start: 2023-02-17 | End: 2023-02-17

## 2023-02-17 RX ADMIN — PREDNISONE 40 MG: 20 TABLET ORAL at 11:15

## 2023-02-17 RX ADMIN — ACETAMINOPHEN 650 MG: 325 TABLET ORAL at 11:15

## 2023-02-17 RX ADMIN — SODIUM CHLORIDE 500 ML: 9 INJECTION, SOLUTION INTRAVENOUS at 09:21

## 2023-02-17 RX ADMIN — LEVOFLOXACIN 750 MG: 750 TABLET, FILM COATED ORAL at 11:15

## 2023-02-17 RX ADMIN — ALBUTEROL SULFATE 5 MG: 2.5 SOLUTION RESPIRATORY (INHALATION) at 11:17

## 2023-02-17 RX ADMIN — IOPAMIDOL 100 ML: 755 INJECTION, SOLUTION INTRAVENOUS at 09:24

## 2023-02-17 RX ADMIN — IPRATROPIUM BROMIDE AND ALBUTEROL SULFATE 3 ML: 2.5; .5 SOLUTION RESPIRATORY (INHALATION) at 10:25

## 2023-02-17 ASSESSMENT — ENCOUNTER SYMPTOMS
COUGH: 1
FEVER: 1
DYSURIA: 1
VOMITING: 0
HEADACHES: 1
SORE THROAT: 1
NAUSEA: 0
SHORTNESS OF BREATH: 0

## 2023-02-17 ASSESSMENT — ACTIVITIES OF DAILY LIVING (ADL)
ADLS_ACUITY_SCORE: 35
ADLS_ACUITY_SCORE: 35

## 2023-02-17 NOTE — ED TRIAGE NOTES
C/o cough, chest pain worse with deep inspiration, epigastric pain, sore throat and dysuria worsening x 3 days.

## 2023-02-17 NOTE — DISCHARGE INSTRUCTIONS
Use albuterol inhaler 2 puffs every 2 hours while awake for 24 hours, then 2 puffs every 3 hours while awake for 24 hours, then as needed

## 2023-02-17 NOTE — ED PROVIDER NOTES
EMERGENCY DEPARTMENT ENCOUNTER      NAME: Cristina Lyles  AGE: 72 year old male  YOB: 1950  MRN: [id number]  EVALUATION DATE & TIME: 2/17/2023  7:51 AM    PCP: Cintia Ortiz    ED PROVIDER: Anish Sosa M.D.    Chief Complaint   Patient presents with     Chest Pain     Cough       FINAL IMPRESSION:  1. Acute cystitis without hematuria    2. Acute bronchitis, unspecified organism        ED COURSE & MEDICAL DECISION MAKING:    Pertinent Labs & Imaging studies independently interpreted by me. (See chart for details)  7:53 AM  Patient seen and examined, prior emergency department clinic records reviewed show history of hives of unknown origin, also with history of gout, also prior admission and September 2016 with pneumonia, myalgias, and fever.  Patient presents today with upper respiratory symptoms of headache, sinus congestion, sore throat, cough as well as some chest tightness.  Concern for influenza or COVID infection.  On exam, diminished breath sounds on the right and crackles on the left, pulmonary edema/effusion or dense consolidation or pneumonia concern, chest x-ray is ordered.  Labs ordered including influenza and COVID swabs.  Patient also notes pain with urination and what sounds like some urinary frequency, no prior diagnosis of urinary tract infection in the past, urinalysis is ordered.  Consider also hematuria as cause of dysuria, depending on urine results, consider CT scan of the abdomen and pelvis to evaluate for stone or renal pathology causing hematuria if this is present on UA.  If chest x-ray and labs are not revealing for source of patient's chest pain and shortness of breath, consider CT PE protocol given tachycardia and increased work of breathing.  8:53 AM labs independently interpreted by me demonstrate normal magnesium, venous blood gas with elevated PCO2 but normal pH, likely chronic hypercapnia.  Patient also noted to have leukocytosis and hemoglobin slightly above  normal limit, this may represent dehydration although does appear the patient's hemoglobin has previously been in the high normal range.  Urinalysis consistent with infection, review of chart shows no prior urine culture.  Chest x-ray independently interpreted by me does not demonstrate any acute findings.  8:56 AM basic panel with stable creatinine for the patient, remaining electrolytes are reassuring.  CT PE protocol is ordered.  9:36 AM CT PE study independently interpreted by me does not demonstrate any large central pulmonary embolism, there does appear to be some left lower lobe bronchial inflammation.  Anticipate discharge with Levaquin for coverage both for urinary tract infection and pulmonary source, steroid for acute bronchitis as well as inhaler.  10:15 AM reviewed radiology interpretation of CT scan.  10:49 AM updated patient and family regarding diagnosis and plan.  On reexamination, improved air movement bilaterally with increased wheezing.  Patient is still tachycardic but not hypoxic or hypotensive and no respiratory distress, stable for discharge.    At the conclusion of the encounter I discussed the results of all of the tests and the disposition. The questions were answered. The patient or family acknowledged understanding and was agreeable with the care plan.     Medical Decision Making    History:    Supplemental history from: Family Member/Significant Other    External Record(s) reviewed: Documented in chart, if applicable.    Work Up:    Chart documentation includes differential considered and any EKGs or imaging independently interpreted by provider, where specified.    In additional to work up documented, I considered the following work up: Documented in chart, if applicable.    External consultation:    Discussion of management with another provider: Documented in chart, if applicable    Complicating factors:    Care impacted by chronic illness: N/A    Care affected by social determinants  of health: N/A    Disposition considerations: Discharge. I prescribed additional prescription strength medication(s) as charted. I considered admission, but discharged patient after significant clinical improvement.    PROCEDURES:       MEDICATIONS GIVEN IN THE EMERGENCY:  Medications   levofloxacin (LEVAQUIN) tablet 750 mg (has no administration in time range)   predniSONE (DELTASONE) tablet 40 mg (has no administration in time range)   ipratropium - albuterol 0.5 mg/2.5 mg/3 mL (DUONEB) neb solution 3 mL (3 mLs Nebulization Given 2/17/23 1025)   0.9% sodium chloride BOLUS (500 mLs Intravenous New Bag 2/17/23 0921)   iopamidol (ISOVUE-370) solution 100 mL (100 mLs Intravenous Given 2/17/23 0973)       NEW PRESCRIPTIONS STARTED AT TODAY'S ER VISIT  New Prescriptions    LEVOFLOXACIN (LEVAQUIN) 750 MG TABLET    Take 1 tablet (750 mg) by mouth daily    PREDNISONE (DELTASONE) 20 MG TABLET    Take 1 tablet (20 mg) by mouth daily for 4 days       =================================================================    HPI    Patient information was obtained from: Patient      Cristina Lyles is a 72 year old male with no pertinent history on file who presents to this ED by walk in for evaluation of chest pain and dysuria.    Patient has been having chest pain, sore throat, cough, headache, fever, and dysuria for 3 days. Patient denies shortness of breath, nausea, vomiting, abdominal pain, and leg swelling.     REVIEW OF SYSTEMS   Review of Systems   Constitutional: Positive for fever.   HENT: Positive for sore throat.    Respiratory: Positive for cough. Negative for shortness of breath.    Cardiovascular: Positive for chest pain. Negative for leg swelling.   Gastrointestinal: Negative for nausea and vomiting.   Genitourinary: Positive for dysuria.   Neurological: Positive for headaches.   All other systems reviewed and are negative.     All other systems reviewed and negative    PAST MEDICAL HISTORY:  Past Medical History:    Diagnosis Date     Chronic gastric ulcer      Constipation      Depressive disorder      Gout      Headache 9/28/2016       PAST SURGICAL HISTORY:  Past Surgical History:   Procedure Laterality Date     NO PAST SURGERIES         CURRENT MEDICATIONS:    Current Facility-Administered Medications   Medication     levofloxacin (LEVAQUIN) tablet 750 mg     predniSONE (DELTASONE) tablet 40 mg     Current Outpatient Medications   Medication     [START ON 2/18/2023] levofloxacin (LEVAQUIN) 750 MG tablet     predniSONE (DELTASONE) 20 MG tablet     acetaminophen (TYLENOL) 500 MG tablet     allopurinol (ZYLOPRIM) 300 MG tablet     aspirin-acetaminophen-caffeine (EXCEDRIN MIGRAINE) 250-250-65 MG tablet     cetirizine (ZYRTEC) 10 MG tablet     colchicine (COLCYRS) 0.6 MG tablet     diclofenac (VOLTAREN) 1 % topical gel     famotidine (PEPCID) 20 MG tablet     hydrOXYzine (VISTARIL) 25 MG capsule     ketotifen (ZADITOR) 0.025 % ophthalmic solution     Polyvinyl Alcohol-Povidone (ARTIFICIAL TEARS) 5-6 MG/ML SOLN     predniSONE (DELTASONE) 20 MG tablet     triamcinolone (KENALOG) 0.1 % external cream     triamcinolone (KENALOG) 0.1 % external cream       ALLERGIES:  Allergies   Allergen Reactions     Nkda [No Known Drug Allergies]        FAMILY HISTORY:  Family History   Problem Relation Age of Onset     Diabetes No family hx of      Coronary Artery Disease No family hx of      Breast Cancer No family hx of      Colon Cancer No family hx of      Prostate Cancer No family hx of      Other Cancer No family hx of      Cancer No family hx of      Heart Disease No family hx of        SOCIAL HISTORY:   Social History     Socioeconomic History     Marital status:    Tobacco Use     Smoking status: Never     Smokeless tobacco: Never   Substance and Sexual Activity     Alcohol use: No     Drug use: No       VITALS:  /62   Pulse 99   Temp 98.7  F (37.1  C)   Resp 20   Wt 66.1 kg (145 lb 11.2 oz)   SpO2 97%   BMI 26.06  kg/m      PHYSICAL EXAM:  Physical Exam  Vitals and nursing note reviewed.   Constitutional:       Appearance: Normal appearance.   HENT:      Head: Normocephalic and atraumatic.      Right Ear: External ear normal.      Left Ear: External ear normal.      Nose: Nose normal.      Mouth/Throat:      Mouth: Mucous membranes are moist.   Eyes:      Extraocular Movements: Extraocular movements intact.      Conjunctiva/sclera: Conjunctivae normal.      Pupils: Pupils are equal, round, and reactive to light.   Cardiovascular:      Rate and Rhythm: Regular rhythm. Tachycardia present.   Pulmonary:      Effort: Pulmonary effort is normal.      Breath sounds: Examination of the right-upper field reveals decreased breath sounds. Examination of the left-upper field reveals rales. Examination of the right-middle field reveals decreased breath sounds. Examination of the left-middle field reveals rales. Examination of the right-lower field reveals decreased breath sounds. Examination of the left-lower field reveals rales. Decreased breath sounds and rales present. No wheezing.   Abdominal:      General: Abdomen is flat. There is no distension.      Palpations: Abdomen is soft.      Tenderness: There is no abdominal tenderness. There is no guarding.   Musculoskeletal:         General: Normal range of motion.      Cervical back: Normal range of motion and neck supple.      Right lower leg: No edema.      Left lower leg: No edema.   Lymphadenopathy:      Cervical: No cervical adenopathy.   Skin:     General: Skin is warm and dry.   Neurological:      General: No focal deficit present.      Mental Status: He is alert and oriented to person, place, and time. Mental status is at baseline.      Comments: No gross focal neurologic deficits   Psychiatric:         Mood and Affect: Mood normal.         Behavior: Behavior normal.         Thought Content: Thought content normal.          LAB:  All pertinent labs reviewed and  interpreted.  Results for orders placed or performed during the hospital encounter of 02/17/23   Chest XR,  PA & LAT    Impression    IMPRESSION: Heart size and vascularity are normal. Shallow inspiration accentuates bibasilar subsegmental atelectasis.. No focal consolidation, pneumothorax nor pleural effusion.   CT Chest Pulmonary Embolism w Contrast    Impression    IMPRESSION:  1.  No pulmonary emboli.  2.  No CTA signs of acute aortic syndrome.  3.  Dilatation of the aortic root level of the sinuses of Valsalva and ectasia of the ascending thoracic aorta.  4.  CT signs of nonspecific bronchial inflammation with strands of linear atelectasis in the lower lungs.  5.  Moderate to marked diffuse hepatic steatosis.   Basic metabolic panel   Result Value Ref Range    Sodium 139 136 - 145 mmol/L    Potassium 4.1 3.4 - 5.3 mmol/L    Chloride 102 98 - 107 mmol/L    Carbon Dioxide (CO2) 26 22 - 29 mmol/L    Anion Gap 11 7 - 15 mmol/L    Urea Nitrogen 15.0 8.0 - 23.0 mg/dL    Creatinine 1.22 (H) 0.67 - 1.17 mg/dL    Calcium 9.3 8.8 - 10.2 mg/dL    Glucose 114 (H) 70 - 99 mg/dL    GFR Estimate 63 >60 mL/min/1.73m2   Result Value Ref Range    Troponin T, High Sensitivity <6 <=22 ng/L   UA with Microscopic reflex to Culture    Specimen: Urine, Midstream   Result Value Ref Range    Color Urine Yellow Colorless, Straw, Light Yellow, Yellow    Appearance Urine Turbid (A) Clear    Glucose Urine Negative Negative mg/dL    Bilirubin Urine Negative Negative    Ketones Urine Negative Negative mg/dL    Specific Gravity Urine 1.022 1.001 - 1.030    Blood Urine 0.06 mg/dL (A) Negative    pH Urine 6.0 5.0 - 7.0    Protein Albumin Urine 30 (A) Negative mg/dL    Urobilinogen Urine <2.0 <2.0 mg/dL    Nitrite Urine Negative Negative    Leukocyte Esterase Urine 25 [place]/uL (A) Negative    Bacteria Urine Few (A) None Seen /HPF    Mucus Urine Present (A) None Seen /LPF    RBC Urine 7 (H) <=2 /HPF    WBC Urine 36 (H) <=5 /HPF    Squamous  Epithelials Urine 2 (H) <=1 /HPF   Symptomatic Influenza A/B & SARS-CoV2 (COVID-19) Virus PCR Multiplex Nasopharyngeal    Specimen: Nasopharyngeal; Swab   Result Value Ref Range    Influenza A PCR Negative Negative    Influenza B PCR Negative Negative    RSV PCR Negative Negative    SARS CoV2 PCR Negative Negative   CBC with platelets and differential   Result Value Ref Range    WBC Count 12.4 (H) 4.0 - 11.0 10e3/uL    RBC Count 6.22 (H) 4.40 - 5.90 10e6/uL    Hemoglobin 17.8 (H) 13.3 - 17.7 g/dL    Hematocrit 54.5 (H) 40.0 - 53.0 %    MCV 88 78 - 100 fL    MCH 28.6 26.5 - 33.0 pg    MCHC 32.7 31.5 - 36.5 g/dL    RDW 13.1 10.0 - 15.0 %    Platelet Count 155 150 - 450 10e3/uL    % Neutrophils 74 %    % Lymphocytes 17 %    % Monocytes 8 %    % Eosinophils 1 %    % Basophils 0 %    % Immature Granulocytes 0 %    NRBCs per 100 WBC 0 <1 /100    Absolute Neutrophils 9.2 (H) 1.6 - 8.3 10e3/uL    Absolute Lymphocytes 2.1 0.8 - 5.3 10e3/uL    Absolute Monocytes 1.0 0.0 - 1.3 10e3/uL    Absolute Eosinophils 0.1 0.0 - 0.7 10e3/uL    Absolute Basophils 0.0 0.0 - 0.2 10e3/uL    Absolute Immature Granulocytes 0.0 <=0.4 10e3/uL    Absolute NRBCs 0.0 10e3/uL   Result Value Ref Range    Magnesium 2.2 1.7 - 2.3 mg/dL   Nt probnp inpatient (BNP)   Result Value Ref Range    N terminal Pro BNP Inpatient <36 0 - 900 pg/mL   Blood gas venous   Result Value Ref Range    pH Venous 7.36 7.35 - 7.45    pCO2 Venous 52 (H) 35 - 50 mm Hg    pO2 Venous 28 25 - 47 mm Hg    Bicarbonate Venous 29 24 - 30 mmol/L    Base Excess/Deficit (+/-) 3.8   mmol/L    Oxyhemoglobin Venous 53.3 (L) 70.0 - 75.0 %    O2 Sat, Venous 54.4 (L) 70.0 - 75.0 %       RADIOLOGY:  Reviewed all pertinent imaging. Please see official radiology report.  CT Chest Pulmonary Embolism w Contrast   Final Result   IMPRESSION:   1.  No pulmonary emboli.   2.  No CTA signs of acute aortic syndrome.   3.  Dilatation of the aortic root level of the sinuses of Valsalva and ectasia of the  ascending thoracic aorta.   4.  CT signs of nonspecific bronchial inflammation with strands of linear atelectasis in the lower lungs.   5.  Moderate to marked diffuse hepatic steatosis.      Chest XR,  PA & LAT   Final Result   IMPRESSION: Heart size and vascularity are normal. Shallow inspiration accentuates bibasilar subsegmental atelectasis.. No focal consolidation, pneumothorax nor pleural effusion.          EKG:    Performed at: 7:42 AM  Impression: Sinus tachycardia, left axis deviation  Rate: 106  Rhythm: Sinus  Axis: Normal  [place] Interval: 176  QRS Interval: 92  QTc Interval: 454  ST Changes: No acute ischemic changes  Comparison: September 2016, no changes    I have independently reviewed and interpreted the EKG(s) documented above.    I, German Bowling, am serving as a scribe to document services personally performed by Dr. Sosa based on my observation and the provider's statements to me. I, Anish Sosa MD attest that German Bowling is acting in a scribe capacity, has observed my performance of the services and has documented them in accordance with my direction.    Anish Sosa M.D.  Emergency Medicine  [facility] EMERGENCY DEPARTMENT  [facility] [facility] [address]  [phone]  Dept: [phone]       Anish Sosa MD  02/17/23 7818

## 2023-02-18 LAB — BACTERIA UR CULT: NO GROWTH

## 2023-03-10 ENCOUNTER — OFFICE VISIT (OUTPATIENT)
Dept: FAMILY MEDICINE | Facility: CLINIC | Age: 73
End: 2023-03-10
Payer: COMMERCIAL

## 2023-03-10 VITALS
DIASTOLIC BLOOD PRESSURE: 75 MMHG | TEMPERATURE: 98.1 F | WEIGHT: 149.2 LBS | HEART RATE: 80 BPM | SYSTOLIC BLOOD PRESSURE: 122 MMHG | BODY MASS INDEX: 26.44 KG/M2 | HEIGHT: 63 IN | OXYGEN SATURATION: 97 % | RESPIRATION RATE: 20 BRPM

## 2023-03-10 DIAGNOSIS — R30.0 DYSURIA: Primary | ICD-10-CM

## 2023-03-10 DIAGNOSIS — K21.9 GASTROESOPHAGEAL REFLUX DISEASE WITHOUT ESOPHAGITIS: ICD-10-CM

## 2023-03-10 DIAGNOSIS — R05.1 ACUTE COUGH: ICD-10-CM

## 2023-03-10 DIAGNOSIS — N41.0 ACUTE PROSTATITIS: ICD-10-CM

## 2023-03-10 LAB
ALBUMIN UR-MCNC: ABNORMAL MG/DL
APPEARANCE UR: CLEAR
BACTERIA #/AREA URNS HPF: ABNORMAL /HPF
BILIRUB UR QL STRIP: NEGATIVE
COLOR UR AUTO: YELLOW
GLUCOSE UR STRIP-MCNC: NEGATIVE MG/DL
HGB UR QL STRIP: ABNORMAL
KETONES UR STRIP-MCNC: NEGATIVE MG/DL
LEUKOCYTE ESTERASE UR QL STRIP: NEGATIVE
MUCOUS THREADS #/AREA URNS LPF: PRESENT /LPF
NITRATE UR QL: NEGATIVE
PH UR STRIP: 5.5 [PH] (ref 5–8)
RBC #/AREA URNS AUTO: ABNORMAL /HPF
SP GR UR STRIP: 1.02 (ref 1–1.03)
SQUAMOUS #/AREA URNS AUTO: ABNORMAL /LPF
UROBILINOGEN UR STRIP-ACNC: 0.2 E.U./DL
WBC #/AREA URNS AUTO: ABNORMAL /HPF

## 2023-03-10 PROCEDURE — 81001 URINALYSIS AUTO W/SCOPE: CPT

## 2023-03-10 PROCEDURE — 99214 OFFICE O/P EST MOD 30 MIN: CPT | Mod: GC

## 2023-03-10 PROCEDURE — 87086 URINE CULTURE/COLONY COUNT: CPT

## 2023-03-10 RX ORDER — FAMOTIDINE 20 MG/1
20 TABLET, FILM COATED ORAL
Qty: 30 TABLET | Refills: 3 | Status: SHIPPED | OUTPATIENT
Start: 2023-03-10 | End: 2023-12-22

## 2023-03-10 RX ORDER — ALBUTEROL SULFATE 90 UG/1
2 AEROSOL, METERED RESPIRATORY (INHALATION) EVERY 6 HOURS PRN
Qty: 18 G | Refills: 0 | Status: SHIPPED | OUTPATIENT
Start: 2023-03-10 | End: 2023-12-22

## 2023-03-10 RX ORDER — LEVOFLOXACIN 500 MG/1
500 TABLET, FILM COATED ORAL DAILY
Qty: 28 TABLET | Refills: 0 | Status: SHIPPED | OUTPATIENT
Start: 2023-03-10 | End: 2023-04-07

## 2023-03-10 ASSESSMENT — ACTIVITIES OF DAILY LIVING (ADL)
FALL_HISTORY_WITHIN_LAST_SIX_MONTHS: NO
DIFFICULTY_COMMUNICATING: NO
CHANGE_IN_FUNCTIONAL_STATUS_SINCE_ONSET_OF_CURRENT_ILLNESS/INJURY: NO
DRESSING/BATHING_DIFFICULTY: NO
WEAR_GLASSES_OR_BLIND: YES
TOILETING_ISSUES: NO
HEARING_DIFFICULTY_OR_DEAF: NO
WALKING_OR_CLIMBING_STAIRS_DIFFICULTY: NO
DOING_ERRANDS_INDEPENDENTLY_DIFFICULTY: NO
CONCENTRATING,_REMEMBERING_OR_MAKING_DECISIONS_DIFFICULTY: NO
DIFFICULTY_EATING/SWALLOWING: NO

## 2023-03-10 NOTE — NURSING NOTE
Due to patient being non-English speaking/uses sign language, an  was used for this visit. Only for face-to-face interpretation by an external agency, date and length of interpretation can be found on the scanned worksheet.     name: narinder [id number]  Agency: [employer] - telephone  Language: Rufina   Telephone number: [phone]  Type of interpretation: Telephone, spoken

## 2023-03-10 NOTE — PROGRESS NOTES
Preceptor Attestation:    I discussed the patient with the resident and evaluated the patient in person. I have verified the content of the note, which accurately reflects my assessment of the patient and the plan of care.   Supervising Physician:  Jeremiah Gorman MD.

## 2023-03-10 NOTE — PROGRESS NOTES
Assessment & Plan     Dysuria  Acute prostatitis  ~ 2 weeks of dysuria. Went to the ED and was given 6 doses of levofloxacin for acute cystitis without hematuria as UA showed increased leukocyte esterase, with no growth on UCx. Patient only took 2 out of the 6 doses as he experienced increased dysuria as a side effect. Reported increased frequency and urgency, and slow stream. UA today was showed no leukocyte esterase and UCx pending. MARK was normal. Symptoms most consistent with acute prostatitis vs unresolved cystitis. Will begin treatment for acute prostatitis.  - UA reflex to Microscopic  - Urine Culture  - Urine Microscopic Exam  - levofloxacin (LEVAQUIN) 500 MG tablet  Dispense: 28 tablet; Refill: 0    Gastroesophageal reflux disease without esophagitis  - Refilled famotidine (PEPCID) 20 MG tablet  Dispense: 30 tablet; Refill: 3    Acute cough  ~2 weeks of productive cough, sore throat, dyspnea, and chest tightness. No history of asthma or COPD. No signs/symptoms of congestive heart failure. Good air movement and clear lung sounds on physical exam. Went to the ED was given duoneb and discharged with 4 doses of prednisone for acute bronchitis, which temporarily relieved symptoms. CT in ED showed bronchial wall thickening, segments of endobronchial secretions, nonspecific bronchial inflammation with strands of linear atelectasis in the lower lungs. Felt relief with duoneb treatment in the ED, so will give him albuterol. Once he is feeling better, would recommend spirometry for asthma and COPD evaluation.  - albuterol (PROAIR HFA/PROVENTIL HFA/VENTOLIN HFA) 108 (90 Base) MCG/ACT inhaler  Dispense: 18 g; Refill: 0      Diagnosis or treatment significantly limited by social determinants of health - limited English, limited health literacy  Prescription drug management  45 minutes spent on the date of the encounter doing chart review, history and exam, documentation and further activities per the note       BMI:  "  Estimated body mass index is 26.85 kg/m  as calculated from the following:    Height as of this encounter: 1.588 m (5' 2.5\").    Weight as of this encounter: 67.7 kg (149 lb 3.2 oz).   Weight management plan: Patient was referred to their PCP to discuss a diet and exercise plan.        No follow-ups on file. Follow-up if symptoms don't improve.    Ji Boyd, MS3  [employer]    Lakshmi Gonzalez MD  [facility] KARLY Evans is a 72 year old accompanied with phone  , presenting for the following health issues:  Other (Sore throat, coughing at night, chest tightness, and painful when peeping.  This been going on for 2 wks)    HPI   Patient presented with 2 weeks of sore throat, cough, and dysuria. Patient reported he was sick with fever, chills, cough, SOB, and dysuria 2 weeks ago and went to the ED where he was given fluids and duoneb which provided relief. He was discharged with 6 doses of levofloxacin and 4 doses of prednisone. He finished the prednisone, but feels his throat hasn't cleared. He is still experiencing a sore throat, dyspnea, chest tightness, and productive cough. Cough worsens at night, especially when he is laying down. No prior history of asthma or COPD. He does has have a history of GERD and reported heartburn and acid reflux. Denies voice changes or hoarseness. Ran out of famotidine at home and would like refills. Denies orthopnea, PND, and new leg swelling. Denies chest pain.    Dysuria: Only took 2 out of 6 doses of levofloxacin as he experienced increased dysuria as a side effect. Reported increased frequency and urgency, and slow stream.  Denies back pain and hematuria.    Review of Systems   Constitutional, HEENT, cardiovascular, pulmonary, gi and gu systems are negative, except as otherwise noted.      Objective    /75   Pulse 80   Temp 98.1  F (36.7  C) (Oral)   Resp 20   Ht 1.588 m (5' 2.5\")   Wt 67.7 kg (149 " lb 3.2 oz)   SpO2 97%   BMI 26.85 kg/m    Body mass index is 26.85 kg/m .  Physical Exam   GENERAL: healthy, alert and no distress  RESP: lungs clear to auscultation - no rales, rhonchi or wheezes  CV: regular rate and rhythm, normal S1 S2, no S3 or S4, no murmur, click or rub, no peripheral edema and peripheral pulses strong  ABDOMEN: soft, nontender, no hepatosplenomegaly, no masses and bowel sounds normal  RECTAL (male): normal sphincter tone, no rectal masses, prostate normal size, smooth, nontender without nodules or masses  MS: no gross musculoskeletal defects noted, no edema  NEURO: Normal strength and tone, mentation intact and speech normal  PSYCH: mentation appears normal, affect normal/bright    Results for orders placed or performed in visit on 03/10/23 (from the past 24 hour(s))   UA reflex to Microscopic   Result Value Ref Range    Color Urine Yellow Colorless, Straw, Light Yellow, Yellow    Appearance Urine Clear Clear    Glucose Urine Negative Negative mg/dL    Bilirubin Urine Negative Negative    Ketones Urine Negative Negative mg/dL    Specific Gravity Urine 1.020 1.005 - 1.030    Blood Urine Trace (A) Negative    pH Urine 5.5 5.0 - 8.0    Protein Albumin Urine Trace (A) Negative mg/dL    Urobilinogen Urine 0.2 0.2, 1.0 E.U./dL    Nitrite Urine Negative Negative    Leukocyte Esterase Urine Negative Negative   Urine Microscopic Exam   Result Value Ref Range    Bacteria Urine Few (A) None Seen /HPF    RBC Urine None Seen 0-2 /HPF /HPF    WBC Urine 0-5 0-5 /HPF /HPF    Squamous Epithelials Urine Few (A) None Seen /LPF    Mucus Urine Present (A) None Seen /LPF       Resident/Fellow Attestation   I, Lakshmi Gonzalez MD, was present with the medical/TAI student who participated in the service and in the documentation of the note.  I have verified the history and personally performed the physical exam and medical decision making.  I agree with the assessment and plan of care as documented in the note.       Lakshmi Gonzalez MD  PGY2

## 2023-03-12 LAB — BACTERIA UR CULT: NO GROWTH

## 2023-04-13 ENCOUNTER — TRANSFERRED RECORDS (OUTPATIENT)
Dept: HEALTH INFORMATION MANAGEMENT | Facility: CLINIC | Age: 73
End: 2023-04-13
Payer: COMMERCIAL

## 2023-04-14 ENCOUNTER — OFFICE VISIT (OUTPATIENT)
Dept: FAMILY MEDICINE | Facility: CLINIC | Age: 73
End: 2023-04-14
Payer: COMMERCIAL

## 2023-04-14 VITALS
TEMPERATURE: 97.7 F | RESPIRATION RATE: 20 BRPM | SYSTOLIC BLOOD PRESSURE: 127 MMHG | WEIGHT: 146 LBS | OXYGEN SATURATION: 98 % | HEART RATE: 78 BPM | DIASTOLIC BLOOD PRESSURE: 79 MMHG | BODY MASS INDEX: 26.28 KG/M2

## 2023-04-14 DIAGNOSIS — R30.0 DYSURIA: Primary | ICD-10-CM

## 2023-04-14 DIAGNOSIS — Z12.5 SCREENING FOR PROSTATE CANCER: Primary | ICD-10-CM

## 2023-04-14 DIAGNOSIS — R30.0 DYSURIA: ICD-10-CM

## 2023-04-14 LAB
ALBUMIN UR-MCNC: NEGATIVE MG/DL
ANION GAP SERPL CALCULATED.3IONS-SCNC: 14 MMOL/L (ref 7–15)
APPEARANCE UR: CLEAR
BILIRUB UR QL STRIP: NEGATIVE
BUN SERPL-MCNC: 21.9 MG/DL (ref 8–23)
CALCIUM SERPL-MCNC: 9 MG/DL (ref 8.8–10.2)
CHLORIDE SERPL-SCNC: 107 MMOL/L (ref 98–107)
COLOR UR AUTO: YELLOW
CREAT SERPL-MCNC: 1.38 MG/DL (ref 0.67–1.17)
DEPRECATED HCO3 PLAS-SCNC: 22 MMOL/L (ref 22–29)
GFR SERPL CREATININE-BSD FRML MDRD: 55 ML/MIN/1.73M2
GLUCOSE SERPL-MCNC: 86 MG/DL (ref 70–99)
GLUCOSE UR STRIP-MCNC: NEGATIVE MG/DL
HGB UR QL STRIP: NEGATIVE
KETONES UR STRIP-MCNC: NEGATIVE MG/DL
LEUKOCYTE ESTERASE UR QL STRIP: NEGATIVE
NITRATE UR QL: NEGATIVE
PH UR STRIP: 5.5 [PH] (ref 5–8)
POTASSIUM SERPL-SCNC: 4 MMOL/L (ref 3.4–5.3)
PSA SERPL DL<=0.01 NG/ML-MCNC: 3.53 NG/ML (ref 0–6.5)
SODIUM SERPL-SCNC: 143 MMOL/L (ref 136–145)
SP GR UR STRIP: 1.02 (ref 1–1.03)
UROBILINOGEN UR STRIP-ACNC: 0.2 E.U./DL

## 2023-04-14 PROCEDURE — 81003 URINALYSIS AUTO W/O SCOPE: CPT | Performed by: FAMILY MEDICINE

## 2023-04-14 PROCEDURE — 80048 BASIC METABOLIC PNL TOTAL CA: CPT | Performed by: FAMILY MEDICINE

## 2023-04-14 PROCEDURE — 99214 OFFICE O/P EST MOD 30 MIN: CPT | Performed by: FAMILY MEDICINE

## 2023-04-14 PROCEDURE — G0103 PSA SCREENING: HCPCS | Performed by: FAMILY MEDICINE

## 2023-04-14 PROCEDURE — 36415 COLL VENOUS BLD VENIPUNCTURE: CPT | Performed by: FAMILY MEDICINE

## 2023-04-14 RX ORDER — TAMSULOSIN HYDROCHLORIDE 0.4 MG/1
0.4 CAPSULE ORAL DAILY
Qty: 30 CAPSULE | Refills: 0 | Status: SHIPPED | OUTPATIENT
Start: 2023-04-14 | End: 2023-12-22

## 2023-04-14 NOTE — PROGRESS NOTES
Patient Active Problem List    Diagnosis Date Noted     Conductive hearing loss, bilateral 01/19/2022     Priority: Medium     Elevated ferritin 01/19/2022     Priority: Medium     Rosacea 02/11/2016     Priority: Medium     Gout 03/22/2013     Priority: Medium     Anxiety state 01/10/2013     Priority: Medium     Problem list name updated by automated process. Provider to review       Constipation 01/10/2013     Priority: Medium     Problem list name updated by automated process. Provider to review       Depressive disorder, not elsewhere classified 01/10/2013     Priority: Medium     Esophageal reflux 01/10/2013     Priority: Medium     Major depressive disorder, recurrent episode (H) 01/10/2013     Priority: Medium     Problem list name updated by automated process. Provider to review       Health Care Home 01/10/2013     Priority: Medium     Tier Level: 1  DX V65.8 REPLACED WITH 43943 [facility]ALTH CARE HOME (04/08/2013)       Nursing Notes:   Agapito Daugherty, CMA  4/14/2023  2:21 PM  Signed  Pt declined covid booster bivalent    Chief Complaint   Patient presents with     Dysuria     Per pt has been having some pain with urination for the past 2 mos now- states that pain is there at the end of him urinating and is getting worse this month, especially if he drinks something sweet     Blood pressure 127/79, pulse 78, temperature 97.7  F (36.5  C), temperature source Oral, resp. rate 20, weight 66.2 kg (146 lb), SpO2 98 %.  Results for orders placed or performed in visit on 04/14/23   UA reflex to Microscopic and Culture     Status: Normal    Specimen: Urine, NOS   Result Value Ref Range    Color Urine Yellow Colorless, Straw, Light Yellow, Yellow    Appearance Urine Clear Clear    Glucose Urine Negative Negative mg/dL    Bilirubin Urine Negative Negative    Ketones Urine Negative Negative mg/dL    Specific Gravity Urine 1.020 1.005 - 1.030    Blood Urine Negative Negative    pH Urine 5.5 5.0 - 8.0    Protein Albumin Urine  Negative Negative mg/dL    Urobilinogen Urine 0.2 0.2, 1.0 E.U./dL    Nitrite Urine Negative Negative    Leukocyte Esterase Urine Negative Negative    Narrative    Microscopic not indicated     Since March, then feels like bladder does not empty completely.  With starting urination, will have more pain.    If drink coke products, has to void right away.  If drinking only water, it's normal, which is some discomfort primarily at the end of his void and a sense of pressure and incomplete voiding.    No smoking.  No gross hematuria he had a UA that showed hematuria at one time.  He has not responded to empiric treatment of prostatitis.    Biggest priority is being able to drink something sweet    Objective:    Vital signs normal.  He is comfortable and in no acute distress.  Chest clear.  Heart regular rate and rhythm.  Abdomen soft and nontender.  There are some slight discomfort in the left lower quadrant but no palpably enlarged bladder.  Prostate is nontender and of normal size.  No nodules.  Penis is uncircumcised but foreskin retracts easily.    He gives additional history that he was catheterized once in [facility]    UA today is unremarkable.    Obstructive voiding symptoms.  His history suggests a stricture.  We will get a PSA today.  He had microscopic hematuria in the past and I think he needs a cystoscopy.  Referral granted to [facility].

## 2023-04-14 NOTE — PATIENT INSTRUCTIONS
[facility]  [address] [place] [address], [address]  [place] [address]    Appointment:  Wednesday, 05/31/2023  Arrival Time:  9:30  Provider:  Ruthy    Please bring in a copy of your insurance card and photo ID    If you cannot make this appointment please call [phone] to reschedule

## 2023-04-21 NOTE — RESULT ENCOUNTER NOTE
[facility]  This patient has a urology appt @ [facility] in May.  Please send my most recent progress note and labs to them, for this appt.  Thanks  ALEX Gorman

## 2023-11-20 ENCOUNTER — OFFICE VISIT (OUTPATIENT)
Dept: FAMILY MEDICINE | Facility: CLINIC | Age: 73
End: 2023-11-20
Payer: COMMERCIAL

## 2023-11-20 VITALS
WEIGHT: 138.6 LBS | OXYGEN SATURATION: 99 % | BODY MASS INDEX: 25.51 KG/M2 | HEIGHT: 62 IN | TEMPERATURE: 97.8 F | RESPIRATION RATE: 12 BRPM | SYSTOLIC BLOOD PRESSURE: 133 MMHG | DIASTOLIC BLOOD PRESSURE: 79 MMHG | HEART RATE: 68 BPM

## 2023-11-20 DIAGNOSIS — N18.2 CKD (CHRONIC KIDNEY DISEASE) STAGE 2, GFR 60-89 ML/MIN: ICD-10-CM

## 2023-11-20 DIAGNOSIS — Z23 NEED FOR PROPHYLACTIC VACCINATION AND INOCULATION AGAINST INFLUENZA: ICD-10-CM

## 2023-11-20 DIAGNOSIS — M72.2 PLANTAR FASCIITIS: Primary | ICD-10-CM

## 2023-11-20 DIAGNOSIS — M1A.0710 CHRONIC GOUT OF RIGHT FOOT, UNSPECIFIED CAUSE: ICD-10-CM

## 2023-11-20 LAB
ANION GAP SERPL CALCULATED.3IONS-SCNC: 10 MMOL/L (ref 7–15)
BUN SERPL-MCNC: 25.3 MG/DL (ref 8–23)
CALCIUM SERPL-MCNC: 9.2 MG/DL (ref 8.8–10.2)
CHLORIDE SERPL-SCNC: 104 MMOL/L (ref 98–107)
CREAT SERPL-MCNC: 1.06 MG/DL (ref 0.67–1.17)
DEPRECATED HCO3 PLAS-SCNC: 26 MMOL/L (ref 22–29)
EGFRCR SERPLBLD CKD-EPI 2021: 75 ML/MIN/1.73M2
GLUCOSE SERPL-MCNC: 89 MG/DL (ref 70–99)
POTASSIUM SERPL-SCNC: 4.3 MMOL/L (ref 3.4–5.3)
SODIUM SERPL-SCNC: 140 MMOL/L (ref 135–145)
URATE SERPL-MCNC: 7.8 MG/DL (ref 3.4–7)

## 2023-11-20 PROCEDURE — 80048 BASIC METABOLIC PNL TOTAL CA: CPT

## 2023-11-20 PROCEDURE — 90662 IIV NO PRSV INCREASED AG IM: CPT

## 2023-11-20 PROCEDURE — 36415 COLL VENOUS BLD VENIPUNCTURE: CPT

## 2023-11-20 PROCEDURE — 99214 OFFICE O/P EST MOD 30 MIN: CPT | Mod: 25

## 2023-11-20 PROCEDURE — 84550 ASSAY OF BLOOD/URIC ACID: CPT

## 2023-11-20 PROCEDURE — 90471 IMMUNIZATION ADMIN: CPT

## 2023-11-20 RX ORDER — ACETAMINOPHEN 500 MG
500-1000 TABLET ORAL EVERY 6 HOURS PRN
Qty: 100 TABLET | Refills: 0 | Status: SHIPPED | OUTPATIENT
Start: 2023-11-20 | End: 2024-02-20

## 2023-11-20 RX ORDER — ALLOPURINOL 300 MG/1
TABLET ORAL
Qty: 30 TABLET | Refills: 5 | Status: SHIPPED | OUTPATIENT
Start: 2023-11-20 | End: 2023-11-29

## 2023-11-20 RX ORDER — COLCHICINE 0.6 MG/1
0.6 TABLET ORAL DAILY PRN
Qty: 30 TABLET | Refills: 0 | Status: SHIPPED | OUTPATIENT
Start: 2023-11-20 | End: 2023-12-22

## 2023-11-20 NOTE — PATIENT INSTRUCTIONS
At the pharmacy, purchase insoles or arch support for your shoes. Wear all the time (inside and outside)

## 2023-11-20 NOTE — PROGRESS NOTES
Physician Attestation   I, Loco Ty MD, saw this patient and agree with the findings and plan of care as documented in the note.      Items personally reviewed/procedural attestation: vitals.    Loco Ty MD

## 2023-11-20 NOTE — PROGRESS NOTES
"  Assessment & Plan   Cristina Lyles is a 73 year old male with PMH gout, CKD here for evaluation of 1 month of right heel pain over plantar fascia without injury/inciting event. No prior episodes. History and exam most consistent with plantar fasciitis.     Plantar fasciitis  - Discussed conservative management with shoe supports (wear inside and outside whenever on feet), exercises, and stretches   - acetaminophen (TYLENOL) 500 MG tablet  Dispense: 100 tablet; Refill: 0  - If no improvement with conservative management, consider PT, could consider night bracing     Need for prophylactic vaccination and inoculation against influenza  - Flu shot given today    Chronic gout of right foot, unspecified cause  Chronic, stable. Last uric acid 6.71-year ago.  Patient does not remember when his last gout flare on his right toe was.  Feels allopurinol and colchicine is a good regimen for him.  Last BMP 7 months ago showed an increase in creatinine to 1.38 with a GFR of 55.  Agreeable to recheck of labs today and refills.  - Uric acid, BMP  - allopurinol (ZYLOPRIM) 300 MG tablet  Dispense: 30 tablet; Refill: 5  - colchicine (COLCRYS) 0.6 MG tablet  Dispense: 30 tablet; Refill: 0  - If CrCl < 30, consider alternative to colchicine for flares.      CKD (chronic kidney disease) stage 2, GFR 60-89 ml/min  Chronic, worsening on last BMP 7 months ago. Will recheck today. Continue allopurinol and colchicine for gout as above based on previous BMP. If CrCl < 30, consider alternative to colchicine for flares.    - Basic metabolic panel     BMI:   Estimated body mass index is 25.15 kg/m  as calculated from the following:    Height as of this encounter: 1.581 m (5' 2.25\").    Weight as of this encounter: 62.9 kg (138 lb 9.6 oz).     Return in about 4 weeks (around 12/18/2023) for Follow up.    Discussed with attending, Dr. Ty.     Aicha Dueñas DO PGY2  [facility]    Yahir Evans is a 73 year old, " "presenting for the following health issues:  RECHECK (Noticing right foot pain x1 month, causing patient issue with walking, seem Dr. Sorenson complete course of celecoxib), Refill Request (Colchicine and allopurinol), Eye Problem (Right eye itching), Imm/Inj (Flu Shot), and Medication Reconciliation (Med reviewed, requires provider's attention for refill)        11/20/2023     8:09 AM   Additional Questions   Roomed by Dina   Accompanied by patient(self)       PEGGY Lyles is a 73 year old patient here for evaluation of right foot pain.   Onset: one month ago  Prior episodes: denies  Trauma/inciting event: denies  Patient reports a history of gout in his right big toe. This has been stable. However, over the last month or so he has been noticing pain over his right sole of his foot near his right heel.  It is exacerbated when he takes the first few steps when he starts the day or after he has been sitting for a while.  Improves with wearing some supportive shoes with insoles.  No radiation, numbness, tingling, wounds.  Otherwise feels well.  Was seen in the last month by an outside provider who prescribed celecoxib, which did not provide any relief.  Has not tried anything else.    Feels that his gout has been well controlled with allopurinol 300 mg and colchicine.  Is not able to tell me when his last flare was.  He feels that this is a good regimen for his gout.  Is requesting refills.      Review of Systems   Constitutional, HEENT, cardiovascular, pulmonary, gi and gu systems are negative, except as otherwise noted.      Objective    /79 (BP Location: Right arm, Patient Position: Sitting, Cuff Size: Adult Regular)   Pulse 68   Temp 97.8  F (36.6  C) (Oral)   Resp 12   Ht 1.581 m (5' 2.25\")   Wt 62.9 kg (138 lb 9.6 oz)   SpO2 99%   BMI 25.15 kg/m    Body mass index is 25.15 kg/m .  Physical Exam   GENERAL: healthy, alert and no distress  NECK: no adenopathy, no asymmetry, masses, or scars and thyroid " normal to palpation  RESP: lungs clear to auscultation - no rales, rhonchi or wheezes  CV: regular rate and rhythm, normal S1 S2, no S3 or S4, no murmur, click or rub, no peripheral edema and peripheral pulses strong  MS: skin on right and left foot intact, no obvious injuries or deformities, tenderness to palpation medial insertion of plantar fascia, right foot without warmth, redness; sensation grossly intact bilaterally, distal pulses 2+ bilaterally, normal and symmetric ROM and strength bilateral ankles, no TTP over achilles, calf, or toes, no redness, warmth, or swelling over foot, ankle, or digits bilaterally    ----- Service Performed and Documented by Resident or Fellow ------

## 2023-11-29 ENCOUNTER — APPOINTMENT (OUTPATIENT)
Dept: INTERPRETER SERVICES | Facility: CLINIC | Age: 73
End: 2023-11-29
Payer: COMMERCIAL

## 2023-11-29 DIAGNOSIS — M1A.0710 CHRONIC GOUT OF RIGHT FOOT, UNSPECIFIED CAUSE: Primary | ICD-10-CM

## 2023-11-29 RX ORDER — ALLOPURINOL 300 MG/1
600 TABLET ORAL DAILY
Qty: 60 TABLET | Refills: 5 | Status: SHIPPED | OUTPATIENT
Start: 2023-11-29 | End: 2023-12-22

## 2023-11-29 RX ORDER — ALLOPURINOL 100 MG/1
100 TABLET ORAL DAILY
Qty: 30 TABLET | Refills: 5 | Status: SHIPPED | OUTPATIENT
Start: 2023-11-29 | End: 2023-11-29

## 2023-12-20 DIAGNOSIS — M1A.0710 CHRONIC GOUT OF RIGHT FOOT, UNSPECIFIED CAUSE: Primary | ICD-10-CM

## 2023-12-22 ENCOUNTER — OFFICE VISIT (OUTPATIENT)
Dept: FAMILY MEDICINE | Facility: CLINIC | Age: 73
End: 2023-12-22
Payer: COMMERCIAL

## 2023-12-22 VITALS
HEART RATE: 85 BPM | WEIGHT: 139.8 LBS | HEIGHT: 62 IN | TEMPERATURE: 97.6 F | SYSTOLIC BLOOD PRESSURE: 121 MMHG | OXYGEN SATURATION: 99 % | DIASTOLIC BLOOD PRESSURE: 66 MMHG | BODY MASS INDEX: 25.73 KG/M2 | RESPIRATION RATE: 20 BRPM

## 2023-12-22 DIAGNOSIS — H10.13 ALLERGIC CONJUNCTIVITIS, BILATERAL: ICD-10-CM

## 2023-12-22 DIAGNOSIS — L21.9 SEBORRHEIC DERMATITIS: ICD-10-CM

## 2023-12-22 DIAGNOSIS — J30.89 SEASONAL ALLERGIC RHINITIS DUE TO OTHER ALLERGIC TRIGGER: ICD-10-CM

## 2023-12-22 DIAGNOSIS — L29.9 ITCHING: ICD-10-CM

## 2023-12-22 DIAGNOSIS — M1A.0710 CHRONIC GOUT OF RIGHT FOOT, UNSPECIFIED CAUSE: Primary | ICD-10-CM

## 2023-12-22 DIAGNOSIS — H43.391 VITREOUS FLOATERS OF RIGHT EYE: ICD-10-CM

## 2023-12-22 LAB — URATE SERPL-MCNC: 6.2 MG/DL (ref 3.4–7)

## 2023-12-22 PROCEDURE — 36415 COLL VENOUS BLD VENIPUNCTURE: CPT | Performed by: STUDENT IN AN ORGANIZED HEALTH CARE EDUCATION/TRAINING PROGRAM

## 2023-12-22 PROCEDURE — 99214 OFFICE O/P EST MOD 30 MIN: CPT | Performed by: STUDENT IN AN ORGANIZED HEALTH CARE EDUCATION/TRAINING PROGRAM

## 2023-12-22 PROCEDURE — 84550 ASSAY OF BLOOD/URIC ACID: CPT | Performed by: STUDENT IN AN ORGANIZED HEALTH CARE EDUCATION/TRAINING PROGRAM

## 2023-12-22 RX ORDER — ALLOPURINOL 300 MG/1
300 TABLET ORAL DAILY
Qty: 90 TABLET | Refills: 3 | Status: SHIPPED | OUTPATIENT
Start: 2023-12-22 | End: 2024-02-20

## 2023-12-22 RX ORDER — METRONIDAZOLE 7.5 MG/G
GEL TOPICAL 2 TIMES DAILY
Qty: 45 G | Refills: 0 | Status: SHIPPED | OUTPATIENT
Start: 2023-12-22 | End: 2024-02-20

## 2023-12-22 RX ORDER — CETIRIZINE HYDROCHLORIDE 10 MG/1
10 TABLET ORAL DAILY
Qty: 30 TABLET | Refills: 5 | Status: SHIPPED | OUTPATIENT
Start: 2023-12-22 | End: 2024-02-20

## 2023-12-22 RX ORDER — TRIAMCINOLONE ACETONIDE 1 MG/G
CREAM TOPICAL 2 TIMES DAILY
Qty: 45 G | Refills: 1 | Status: SHIPPED | OUTPATIENT
Start: 2023-12-22 | End: 2024-02-20

## 2023-12-22 RX ORDER — COLCHICINE 0.6 MG/1
0.6 TABLET ORAL DAILY PRN
Qty: 90 TABLET | Refills: 3 | Status: SHIPPED | OUTPATIENT
Start: 2023-12-22 | End: 2024-02-20

## 2023-12-22 RX ORDER — POLYVINYL ALCOHOL, POVIDONE .5; .6 G/100ML; G/100ML
1 LIQUID OPHTHALMIC 3 TIMES DAILY PRN
Qty: 15 ML | Refills: 0 | Status: SHIPPED | OUTPATIENT
Start: 2023-12-22 | End: 2024-02-20

## 2023-12-22 ASSESSMENT — ANXIETY QUESTIONNAIRES
3. WORRYING TOO MUCH ABOUT DIFFERENT THINGS: NOT AT ALL
5. BEING SO RESTLESS THAT IT IS HARD TO SIT STILL: NOT AT ALL
1. FEELING NERVOUS, ANXIOUS, OR ON EDGE: NOT AT ALL
2. NOT BEING ABLE TO STOP OR CONTROL WORRYING: NOT AT ALL
7. FEELING AFRAID AS IF SOMETHING AWFUL MIGHT HAPPEN: NOT AT ALL
IF YOU CHECKED OFF ANY PROBLEMS ON THIS QUESTIONNAIRE, HOW DIFFICULT HAVE THESE PROBLEMS MADE IT FOR YOU TO DO YOUR WORK, TAKE CARE OF THINGS AT HOME, OR GET ALONG WITH OTHER PEOPLE: NOT DIFFICULT AT ALL
6. BECOMING EASILY ANNOYED OR IRRITABLE: NOT AT ALL
GAD7 TOTAL SCORE: 0
GAD7 TOTAL SCORE: 0

## 2023-12-22 ASSESSMENT — PATIENT HEALTH QUESTIONNAIRE - PHQ9
5. POOR APPETITE OR OVEREATING: NOT AT ALL
SUM OF ALL RESPONSES TO PHQ QUESTIONS 1-9: 1

## 2023-12-22 NOTE — PROGRESS NOTES
There are no exam notes on file for this visit.    ASSESSMENT AND PLAN:      Cristina was seen today for follow-up on multiple chronic issues.    Diagnoses and all orders for this visit:    Chronic gout of right foot, unspecified cause  Foot pain has improved.  On last visit this was consistent with plantar fasciitis, not gout.  Minimal pain now.  Doing lifestyle movements including wearing good shoes.  He will continue his Voltaren.  We is also due for his uric acid check and needs refills on his colchicine and allopurinol.  These were provided today.  We may need to titrate up but we will see what his uric acid level looks like in 6 to 8 weeks.  -     Uric acid  -     colchicine (COLCRYS) 0.6 MG tablet; Take 1 tablet (0.6 mg) by mouth daily as needed for gout pain  -     allopurinol (ZYLOPRIM) 300 MG tablet; Take 1 tablet (300 mg) by mouth daily TXHUA HNUB NOJ 1 LUB TSHUAJ PAB MOB KO TAW VWM  -     diclofenac (VOLTAREN) 1 % topical gel; Apply 2 g topically 3 times daily as needed for moderate pain Apply to areas with pain- chest, knees, back as needed.    Allergic conjunctivitis, bilateral  Seasonal allergic rhinitis due to other allergic trigger  Seasonal allergies.  I refilled his Zyrtec, eyedrops, and steroid cream for itching.  -     cetirizine (ZYRTEC) 10 MG tablet; Take 1 tablet (10 mg) by mouth daily  -     Polyvinyl Alcohol-Povidone (ARTIFICIAL TEARS) 5-6 MG/ML SOLN; Apply 1 drop to eye 3 times daily as needed (dry eyes)  -     triamcinolone (KENALOG) 0.1 % external cream; Apply topically 2 times daily    Seborrheic dermatitis  Rash on face consistent with seborrheic dermatitis.  Will prescribe metronidazole gel.  -     metroNIDAZOLE (METROGEL) 0.75 % external gel; Apply topically 2 times daily Apply to face, cheeks, forehead    Other orders  -     diclofenac (VOLTAREN) 1 % topical gel; Apply 2 g topically 3 times daily as needed for moderate pain Apply to areas with pain- chest, knees, back as  "needed.    Follow-up in 6 to 8 weeks for recheck of uric acid levels and review of medications.  He will bring all his medications and at that time.    Patient Instructions   Refill medications    Continue with the gout medications - one pill of each of the allopurinol and the colchicine.     Refill allergy pills    New cream for the face :  Metronidazole    Refill eye drops    Refill the creams for the knee/joint pain (Voltaren)  Refill the cream for the itchy skin (triamcinolone)    I am glad that your feet are feeling better!    Wear the solid, good shoes, use massage and use the pain cream on the feet.            Cintia Ortiz MD    SUBJECTIVE  Cristina Lyles is a 73 year old male with past medical history significant for    Patient Active Problem List   Diagnosis    Anxiety state    Constipation    Depressive disorder, not elsewhere classified    Esophageal reflux    Major depressive disorder, recurrent episode (H24)    Health Care Home    Gout    Rosacea    Conductive hearing loss, bilateral    Elevated ferritin     Others present at the visit:  [facility]  Berhane Sorenson present in person    Presents for   Chief Complaint   Patient presents with    Other     Follow-up last visit, for med refill and check right eye itchy for months now.     Patient presents today to review medications and follow-up on his foot pain.    He still has some mild pain in his heel but this is much better.  Went to a podiatrist, Dr. Loco Sorenson, he was given an \"a few pills \"and he took these and his pain nearly went away.  He has been wearing good shoes and doing some massage and that is also helpful.    He is wanting a refill on his gout medication.  He has previously taken 2 pills daily, 1 pink and 1 purple, and he has not had a gout flare for over a year.  Has been out of his medications for about a month.  He would like refills sent in.    He is also noticing some redness and itchiness below his eyes.  He has been using hot " "water and this helps temporarily.  No difficulties with his vision, no blurry vision, does have some runny nose and would like a refill on his allergy medications as well.  Has been using a cream on his skin when he gets Almanza itchy and this works well 2.    He has no questions about his COVID shot.  He is wanting to get this if it is recommended today.    We reviewed and updated his med list.  The only meds he takes on a regular basis are the 2 gout meds, which I believe are the allopurinol and the colchicine.  Otherwise he takes all his other medications as needed.    He would like a refill on the Voltaren gel as it has been helpful for pain in his knees and feet.    OBJECTIVE:  Vitals: /66   Pulse 85   Temp 97.6  F (36.4  C) (Oral)   Resp 20   Ht 1.58 m (5' 2.2\")   Wt 63.4 kg (139 lb 12.8 oz)   SpO2 99%   BMI 25.41 kg/m    BMI= Body mass index is 25.41 kg/m .  Objective:    Vitals:  Vitals are reviewed and are within the normal range  Gen:  Alert, pleasant, no acute distress  HEENT: He has some erythema along his eyebrows as well as below his eyes.  No conjunctival erythema.  Normal eye movements.  Pupillary reflexes are appropriate.  Mild nasal congestion.  Cardiac:  Regular rate and rhythm, no murmurs, rubs or gallops  Respiratory:  Lungs clear to auscultation bilaterally  Extremities:  Warm, well-perfused, pulses 2+/4, no lower extremity edema, mild pain with deep palpation to the heel.  No skin changes.  No erythema or warmth consistent with gout.    We discussed his lab work from last visit.  He was off of his Medications at that time.  Office Visit on 11/20/2023   Component Date Value Ref Range Status    Uric Acid 11/20/2023 7.8 (H)  3.4 - 7.0 mg/dL Final    Sodium 11/20/2023 140  135 - 145 mmol/L Final    Reference intervals for this test were updated on 09/26/2023 to more accurately reflect our healthy population. There may be differences in the flagging of prior results with similar values " performed with this method. Interpretation of those prior results can be made in the context of the updated reference intervals.     Potassium 11/20/2023 4.3  3.4 - 5.3 mmol/L Final    Chloride 11/20/2023 104  98 - 107 mmol/L Final    Carbon Dioxide (CO2) 11/20/2023 26  22 - 29 mmol/L Final    Anion Gap 11/20/2023 10  7 - 15 mmol/L Final    Urea Nitrogen 11/20/2023 25.3 (H)  8.0 - 23.0 mg/dL Final    Creatinine 11/20/2023 1.06  0.67 - 1.17 mg/dL Final    GFR Estimate 11/20/2023 75  >60 mL/min/1.73m2 Final    Calcium 11/20/2023 9.2  8.8 - 10.2 mg/dL Final    Glucose 11/20/2023 89  70 - 99 mg/dL Final         No results found for any visits on 12/22/23.        Patient Instructions   Refill medications    Continue with the gout medications - one pill of each of the allopurinol and the colchicine.     Refill allergy pills    New cream for the face :  Metronidazole    Refill eye drops    Refill the creams for the knee/joint pain (Voltaren)  Refill the cream for the itchy skin (triamcinolone)    I am glad that your feet are feeling better!    Wear the solid, good shoes, use massage and use the pain cream on the feet.            Cintia Ortiz MD

## 2023-12-22 NOTE — LETTER
December 26, 2023      Cristina Lyles  [address] [place] [address]        Dear ,    We are writing to inform you of your test results.    Your uric acid level came back looking good in clinic.  Please continue to take the medications, one pill of the Allopurinol and one pill of the colchicine every day.  Please call the clinic at [phone] if you have any questions.     Resulted Orders   Uric acid   Result Value Ref Range    Uric Acid 6.2 3.4 - 7.0 mg/dL       If you have any questions or concerns, please call the clinic at the number listed above.       Sincerely,      Cintia Ortiz MD

## 2023-12-22 NOTE — PATIENT INSTRUCTIONS
Refill medications    Continue with the gout medications - one pill of each of the allopurinol and the colchicine.     Refill allergy pills    New cream for the face :  Metronidazole    Refill eye drops    Refill the creams for the knee/joint pain (Voltaren)  Refill the cream for the itchy skin (triamcinolone)    I am glad that your feet are feeling better!    Wear the solid, good shoes, use massage and use the pain cream on the feet.           Body Location Override (Optional - Billing Will Still Be Based On Selected Body Map Location If Applicable): left ventral forearm Detail Level: Simple Depth Of Biopsy: dermis Was A Bandage Applied: Yes Size Of Lesion In Cm: 0.4 X Size Of Lesion In Cm: 0 Biopsy Type: H and E Biopsy Method: Personna blade Anesthesia Type: 1% lidocaine without epinephrine Anesthesia Volume In Cc (Will Not Render If 0): 0.5 Hemostasis: Aluminum Chloride Wound Care: Aquaphor Dressing: pressure dressing with telfa Destruction After The Procedure: No Type Of Destruction Used: Curettage Curettage Text: The wound bed was treated with curettage after the biopsy was performed. Cryotherapy Text: The wound bed was treated with cryotherapy after the biopsy was performed. Electrodesiccation Text: The wound bed was treated with electrodesiccation after the biopsy was performed. Electrodesiccation And Curettage Text: The wound bed was treated with electrodesiccation and curettage after the biopsy was performed. Silver Nitrate Text: The wound bed was treated with silver nitrate after the biopsy was performed. Lab: -[id number] Consent: Written consent was obtained and risks were reviewed including but not limited to scarring, infection, bleeding, scabbing, incomplete removal, nerve damage and allergy to anesthesia. Post-Care Instructions: I reviewed with the patient in detail post-care instructions. Patient is to keep the biopsy site dry overnight, and then apply bacitracin twice daily until healed. Patient may apply hydrogen peroxide soaks to remove any crusting. Notification Instructions: Patient will be notified of biopsy results. However, patient instructed to call the office if not contacted within 2 weeks. Billing Type: [employer] Information: Selecting Yes will display possible errors in your note based on the variables you have selected. This validation is only offered as a suggestion for you. PLEASE NOTE THAT THE VALIDATION TEXT WILL BE REMOVED WHEN YOU FINALIZE YOUR NOTE. IF YOU WANT TO FAX A PRELIMINARY NOTE YOU WILL NEED TO TOGGLE THIS TO 'NO' IF YOU DO NOT WANT IT IN YOUR FAXED NOTE.

## 2023-12-23 NOTE — RESULT ENCOUNTER NOTE
Cristina Lyles-    Your uric acid level came back looking good in clinic.  Please continue to take the medications, one pill of the Allopurinol and one pill of the colchicine every day.  Please call the clinic at [phone] if you have any questions.      Cintia Ortiz MD    Please send results to patient.

## 2024-02-20 ENCOUNTER — OFFICE VISIT (OUTPATIENT)
Dept: FAMILY MEDICINE | Facility: CLINIC | Age: 74
End: 2024-02-20
Payer: COMMERCIAL

## 2024-02-20 VITALS
BODY MASS INDEX: 26.83 KG/M2 | HEART RATE: 75 BPM | SYSTOLIC BLOOD PRESSURE: 128 MMHG | RESPIRATION RATE: 18 BRPM | DIASTOLIC BLOOD PRESSURE: 73 MMHG | HEIGHT: 62 IN | OXYGEN SATURATION: 98 % | TEMPERATURE: 98 F | WEIGHT: 145.8 LBS

## 2024-02-20 DIAGNOSIS — F33.2 SEVERE EPISODE OF RECURRENT MAJOR DEPRESSIVE DISORDER, WITHOUT PSYCHOTIC FEATURES (H): ICD-10-CM

## 2024-02-20 DIAGNOSIS — L21.9 SEBORRHEIC DERMATITIS: ICD-10-CM

## 2024-02-20 DIAGNOSIS — M25.551 HIP PAIN, RIGHT: ICD-10-CM

## 2024-02-20 DIAGNOSIS — L29.9 ITCHING: ICD-10-CM

## 2024-02-20 DIAGNOSIS — Z12.11 SCREEN FOR COLON CANCER: ICD-10-CM

## 2024-02-20 DIAGNOSIS — M72.2 PLANTAR FASCIITIS: ICD-10-CM

## 2024-02-20 DIAGNOSIS — J30.89 SEASONAL ALLERGIC RHINITIS DUE TO OTHER ALLERGIC TRIGGER: ICD-10-CM

## 2024-02-20 DIAGNOSIS — M1A.0710 CHRONIC GOUT OF RIGHT FOOT, UNSPECIFIED CAUSE: Primary | ICD-10-CM

## 2024-02-20 PROCEDURE — 90480 ADMN SARSCOV2 VAC 1/ONLY CMP: CPT | Performed by: STUDENT IN AN ORGANIZED HEALTH CARE EDUCATION/TRAINING PROGRAM

## 2024-02-20 PROCEDURE — 99214 OFFICE O/P EST MOD 30 MIN: CPT | Mod: 25 | Performed by: STUDENT IN AN ORGANIZED HEALTH CARE EDUCATION/TRAINING PROGRAM

## 2024-02-20 PROCEDURE — 91320 SARSCV2 VAC 30MCG TRS-SUC IM: CPT | Performed by: STUDENT IN AN ORGANIZED HEALTH CARE EDUCATION/TRAINING PROGRAM

## 2024-02-20 RX ORDER — TRIAMCINOLONE ACETONIDE 1 MG/G
CREAM TOPICAL 2 TIMES DAILY
Qty: 45 G | Refills: 1 | Status: SHIPPED | OUTPATIENT
Start: 2024-02-20

## 2024-02-20 RX ORDER — CETIRIZINE HYDROCHLORIDE 10 MG/1
10 TABLET ORAL DAILY
Qty: 30 TABLET | Refills: 5 | Status: SHIPPED | OUTPATIENT
Start: 2024-02-20

## 2024-02-20 RX ORDER — ACETAMINOPHEN 500 MG
500-1000 TABLET ORAL EVERY 6 HOURS PRN
Qty: 100 TABLET | Refills: 0 | Status: SHIPPED | OUTPATIENT
Start: 2024-02-20

## 2024-02-20 RX ORDER — COLCHICINE 0.6 MG/1
0.6 TABLET ORAL DAILY PRN
Qty: 90 TABLET | Refills: 3 | Status: SHIPPED | OUTPATIENT
Start: 2024-02-20

## 2024-02-20 RX ORDER — ALLOPURINOL 300 MG/1
300 TABLET ORAL DAILY
Qty: 90 TABLET | Refills: 3 | Status: SHIPPED | OUTPATIENT
Start: 2024-02-20

## 2024-02-20 ASSESSMENT — ANXIETY QUESTIONNAIRES
3. WORRYING TOO MUCH ABOUT DIFFERENT THINGS: NOT AT ALL
6. BECOMING EASILY ANNOYED OR IRRITABLE: NOT AT ALL
5. BEING SO RESTLESS THAT IT IS HARD TO SIT STILL: NOT AT ALL
1. FEELING NERVOUS, ANXIOUS, OR ON EDGE: NOT AT ALL
GAD7 TOTAL SCORE: 1
7. FEELING AFRAID AS IF SOMETHING AWFUL MIGHT HAPPEN: SEVERAL DAYS
2. NOT BEING ABLE TO STOP OR CONTROL WORRYING: NOT AT ALL
GAD7 TOTAL SCORE: 1
IF YOU CHECKED OFF ANY PROBLEMS ON THIS QUESTIONNAIRE, HOW DIFFICULT HAVE THESE PROBLEMS MADE IT FOR YOU TO DO YOUR WORK, TAKE CARE OF THINGS AT HOME, OR GET ALONG WITH OTHER PEOPLE: NOT DIFFICULT AT ALL

## 2024-02-20 ASSESSMENT — PATIENT HEALTH QUESTIONNAIRE - PHQ9
SUM OF ALL RESPONSES TO PHQ QUESTIONS 1-9: 0
5. POOR APPETITE OR OVEREATING: NOT AT ALL

## 2024-02-20 NOTE — PATIENT INSTRUCTIONS
Keep up the good work!    Keep taking the 2 medications everyday for your feet.    Do the 3 exercises for your hip to stretch it out.      Refill creams for face and foot pain.     COVID shot today.     FIT testing.

## 2024-02-20 NOTE — LETTER
February 23, 2024      Cristina Lyles  [address] [place] [address]        Dear ,    We are writing to inform you of your test results.    Your FIT testing to look for colon cancer came back negative.  This is good news!  Please call the clinic at [phone] if you have any questions.     Resulted Orders   Fecal colorectal cancer screen FIT - Future (S+30)   Result Value Ref Range    Occult Blood Screen FIT Negative Negative       If you have any questions or concerns, please call the clinic at the number listed above.       Sincerely,      Cintia Ortiz MD

## 2024-02-20 NOTE — PROGRESS NOTES
There are no exam notes on file for this visit.    ASSESSMENT AND PLAN:      Cristina was seen today for other.  All concerns addressed today.    Diagnoses and all orders for this visit:    Chronic gout of right foot, unspecified cause  His gout has been stable.  Refilled his allopurinol and colchicine.  Uric acid was already at goal at last visit so I did not recheck this today.  -     allopurinol (ZYLOPRIM) 300 MG tablet; Take 1 tablet (300 mg) by mouth daily TXHUA HNUB NOJ 1 LUB TSHUAJ PAB MOB KO TAW VWM  -     colchicine (COLCRYS) 0.6 MG tablet; Take 1 tablet (0.6 mg) by mouth daily as needed for gout pain    Screen for colon cancer  He is open to completing colon cancer screening and this was ordered today.  -     Fecal colorectal cancer screen FIT - Future (S+30); Future    Severe episode of recurrent major depressive disorder, without psychotic features (H)  No depressive symptoms today.  His PHQ-9 is 1.  No medications or therapy at this time.    Seborrheic dermatitis  Itching  Seasonal allergic rhinitis due to other allergic trigger  Refill triamcinolone for itching and rash.  -     triamcinolone (KENALOG) 0.1 % external cream; Apply topically 2 times daily  -     cetirizine (ZYRTEC) 10 MG tablet; Take 1 tablet (10 mg) by mouth daily    Plantar fasciitis  Voltaren is working well for his plantar fasciitis.  We refilled this as well as Tylenol for general aches and pains.  -     diclofenac (VOLTAREN) 1 % topical gel; Apply 2 g topically 3 times daily as needed for moderate pain Apply to areas with pain- chest, knees, back as needed.  -     acetaminophen (TYLENOL) 500 MG tablet; Take 1-2 tablets (500-1,000 mg) by mouth every 6 hours as needed for mild pain    Hip pain, right  Hip exam shows muscle tightness, with no evidence of additional other damage and no red flag symptoms for low back pain.  He will try using Tylenol and Voltaren and we gave him some exercises to do at home.  -     diclofenac (VOLTAREN) 1 %  topical gel; Apply 2 g topically 3 times daily as needed for moderate pain Apply to areas with pain- chest, knees, back as needed.  -     acetaminophen (TYLENOL) 500 MG tablet; Take 1-2 tablets (500-1,000 mg) by mouth every 6 hours as needed for mild pain    Other orders  COVID-19 shot completed today.  -     COVID-19 12+ (2023-24) (PFIZER)        Patient Instructions   Keep up the good work!    Keep taking the 2 medications everyday for your feet.    Do the 3 exercises for your hip to stretch it out.      Refill creams for face and foot pain.     COVID shot today.     FIT testing.             Cintia Ortiz MD    KASHIF Lyles is a 73 year old male with past medical history significant for    Patient Active Problem List   Diagnosis    Anxiety state    Constipation    Depressive disorder, not elsewhere classified    Esophageal reflux    Major depressive disorder, recurrent episode (H24)    Health Care Home    Gout    Rosacea    Conductive hearing loss, bilateral    Elevated ferritin     Others present at the visit:  [facility] [place] in person    Presents for   Chief Complaint   Patient presents with    Other     Med check      Patient presents today for follow-up of multiple issues.  Overall has been doing well since her last visit.    He reports continuing to take the 2 pills daily for his feet.  I believe these are the colchicine and the allopurinol.  Denies any foot pain, swelling, or redness.  Does use Voltaren occasionally on his heel, because he gets sore when he is on his feet shopping or doing other activities.  This works well.  He would like a refill on all of these medications.    The cream for his face has been helpful.  He would like a refill on this.    He has a new pain in his right hip.  Shares that when he has heel pain, his hip does not bother him and when he has hip pain his heel does bother him.  He describes it as a pressure.  It hurts more when he is sitting or when  "laying it if, so he has cleared the infection.  No further testing is needed.  hep B DNA quant at night.  No pain with walking.  No weakness.  Hurts only in 1 spot without radiation.  Rates it a 5 out of 10.  No difficulty with bowel or bladder.  No recent injuries.  He has not tried Voltaren on this area.    Has had more itching of his skin and eyes as well as body aching with the change in weather.  He would like refills on Tylenol, cetirizine, to help with itching and pain.    She went to the eye doctor and got a different set of eyedrops that works better for him.  He will continue with that 1 and does not need refills of the medication from us.    He is open to getting a COVID shot today.    He is open to doing FIT testing today.    His BPA is triggering for chronic hepatitis B, however he has a negative hep B antigen, with positive core antibody and negative hep B DNA quant.  No further testing is needed.  He does not have chronic hep B.    He endorses no difficulties with mood and his PHQ-9 is a 1.    OBJECTIVE:  Vitals: /73   Pulse 75   Temp 98  F (36.7  C) (Oral)   Resp 18   Ht 1.58 m (5' 2.2\")   Wt 66.1 kg (145 lb 12.8 oz)   SpO2 98%   BMI 26.50 kg/m    BMI= Body mass index is 26.5 kg/m .  Objective:    Vitals:  Vitals are reviewed and are within the normal range  Gen:  Alert, pleasant, no acute distress  HEENT: Mild rosacea and some seborrheic dermatitis scaling along his forehead and upper eyebrows.  Cardiac:  Regular rate and rhythm, no murmurs, rubs or gallops  Back: No CVA tenderness, no vertebral body tenderness, no paraspinous muscle tenderness, he has a positive Sally for test, positive hip flexion test, strength is intact.  Negative straight leg raise.  Gait is normal.  Extremities:  Warm, well-perfused, pulses 2+/4, no lower extremity edema  Psych: Mood and affect are bright, positive.        8/9/2022    10:16 AM 11/30/2022    10:11 AM 12/22/2023    11:03 AM   PHQ   PHQ-9 Total Score " 0 0 1   Q9: Thoughts of better off dead/self-harm past 2 weeks Not at all Not at all Not at all            Patient Instructions   Keep up the good work!    Keep taking the 2 medications everyday for your feet.    Do the 3 exercises for your hip to stretch it out.      Refill creams for face and foot pain.     COVID shot today.     FIT testing.             Cintia Ortiz MD

## 2024-02-21 PROCEDURE — 82274 ASSAY TEST FOR BLOOD FECAL: CPT | Performed by: STUDENT IN AN ORGANIZED HEALTH CARE EDUCATION/TRAINING PROGRAM

## 2024-02-23 LAB — HEMOCCULT STL QL IA: NEGATIVE

## 2024-02-23 NOTE — RESULT ENCOUNTER NOTE
Cristina Lyles-    Your FIT testing to look for colon cancer came back negative.  This is good news!  Please call the clinic at [phone] if you have any questions.      Cintia Ortiz MD    Please send results to patient.

## 2024-10-25 ENCOUNTER — OFFICE VISIT (OUTPATIENT)
Dept: FAMILY MEDICINE | Facility: CLINIC | Age: 74
End: 2024-10-25
Payer: COMMERCIAL

## 2024-10-25 VITALS
WEIGHT: 137.2 LBS | SYSTOLIC BLOOD PRESSURE: 136 MMHG | BODY MASS INDEX: 25.25 KG/M2 | RESPIRATION RATE: 20 BRPM | TEMPERATURE: 97.6 F | OXYGEN SATURATION: 99 % | HEIGHT: 62 IN | HEART RATE: 66 BPM | DIASTOLIC BLOOD PRESSURE: 67 MMHG

## 2024-10-25 DIAGNOSIS — K76.0 HEPATIC STEATOSIS: ICD-10-CM

## 2024-10-25 DIAGNOSIS — M1A.0710 CHRONIC GOUT OF RIGHT FOOT, UNSPECIFIED CAUSE: Primary | ICD-10-CM

## 2024-10-25 DIAGNOSIS — M1A.0710 CHRONIC GOUT OF RIGHT FOOT, UNSPECIFIED CAUSE: ICD-10-CM

## 2024-10-25 PROBLEM — N39.0 RECURRENT UTI: Status: ACTIVE | Noted: 2024-10-25

## 2024-10-25 PROCEDURE — 36415 COLL VENOUS BLD VENIPUNCTURE: CPT | Performed by: STUDENT IN AN ORGANIZED HEALTH CARE EDUCATION/TRAINING PROGRAM

## 2024-10-25 PROCEDURE — 84550 ASSAY OF BLOOD/URIC ACID: CPT | Performed by: STUDENT IN AN ORGANIZED HEALTH CARE EDUCATION/TRAINING PROGRAM

## 2024-10-25 PROCEDURE — 80061 LIPID PANEL: CPT | Performed by: STUDENT IN AN ORGANIZED HEALTH CARE EDUCATION/TRAINING PROGRAM

## 2024-10-25 PROCEDURE — 99214 OFFICE O/P EST MOD 30 MIN: CPT | Mod: 25 | Performed by: STUDENT IN AN ORGANIZED HEALTH CARE EDUCATION/TRAINING PROGRAM

## 2024-10-25 PROCEDURE — 80048 BASIC METABOLIC PNL TOTAL CA: CPT | Performed by: STUDENT IN AN ORGANIZED HEALTH CARE EDUCATION/TRAINING PROGRAM

## 2024-10-25 PROCEDURE — 90471 IMMUNIZATION ADMIN: CPT | Performed by: STUDENT IN AN ORGANIZED HEALTH CARE EDUCATION/TRAINING PROGRAM

## 2024-10-25 PROCEDURE — 90662 IIV NO PRSV INCREASED AG IM: CPT | Performed by: STUDENT IN AN ORGANIZED HEALTH CARE EDUCATION/TRAINING PROGRAM

## 2024-10-25 RX ORDER — PREDNISONE 20 MG/1
20 TABLET ORAL DAILY
Qty: 7 TABLET | Refills: 0 | Status: SHIPPED | OUTPATIENT
Start: 2024-10-25

## 2024-10-25 ASSESSMENT — PATIENT HEALTH QUESTIONNAIRE - PHQ9
SUM OF ALL RESPONSES TO PHQ QUESTIONS 1-9: 2
SUM OF ALL RESPONSES TO PHQ QUESTIONS 1-9: 2
10. IF YOU CHECKED OFF ANY PROBLEMS, HOW DIFFICULT HAVE THESE PROBLEMS MADE IT FOR YOU TO DO YOUR WORK, TAKE CARE OF THINGS AT HOME, OR GET ALONG WITH OTHER PEOPLE: NOT DIFFICULT AT ALL

## 2024-10-25 NOTE — PATIENT INSTRUCTIONS
Take prednisone, 1 pill daily for 7 days.   Continue with same gout medications.    Follow up in 3 months.    
Yes

## 2024-10-25 NOTE — LETTER
October 28, 2024      Cristina Lyles  [address]  [place] [address]        Dear ,    We are writing to inform you of your test results.    It was nice to see you in clinic.  Here is a copy of your lab results.  Your kidney tests are stable and look good.  Your blood sugar looks okay.  Your cholesterol levels are excellent.  Your uric acid level is a little high, which may be why you had this gout flare.  I would like to increase your allopurinol (daily gout medication).  Please continue to take the 300mg of allopurinol.  Also start taking an additional 100mg of allopurinol for a total dose of 400mg allopurinol.  I hope the prednisone is also helping with the pain and swelling.  Please call the clinic at [phone] if you have any questions.     Resulted Orders   Lipid Profile   Result Value Ref Range    Cholesterol 190 <200 mg/dL    Triglycerides 224 (H) <150 mg/dL    Direct Measure HDL 54 >=40 mg/dL    LDL Cholesterol Calculated 91 <100 mg/dL    Non HDL Cholesterol 136 (H) <130 mg/dL    Patient Fasting > 8hrs? Unknown     Narrative    Cholesterol  Desirable: < 200 mg/dL  Borderline High: 200 - 239 mg/dL  High: >= 240 mg/dL    Triglycerides  Normal: < 150 mg/dL  Borderline High: 150 - 199 mg/dL  High: 200-499 mg/dL  Very High: >= 500 mg/dL    Direct Measure HDL  Female: >= 50 mg/dL   Male: >= 40 mg/dL    LDL Cholesterol  Desirable: < 100 mg/dL  Above Desirable: 100 - 129 mg/dL   Borderline High: 130 - 159 mg/dL   High:  160 - 189 mg/dL   Very High: >= 190 mg/dL    Non HDL Cholesterol  Desirable: < 130 mg/dL  Above Desirable: 130 - 159 mg/dL  Borderline High: 160 - 189 mg/dL  High: 190 - 219 mg/dL  Very High: >= 220 mg/dL   Uric acid   Result Value Ref Range    Uric Acid 7.6 (H) 3.4 - 7.0 mg/dL   Basic metabolic panel   Result Value Ref Range    Sodium 137 135 - 145 mmol/L    Potassium 4.6 3.4 - 5.3 mmol/L    Chloride 102 98 - 107 mmol/L    Carbon Dioxide (CO2) 24 22 - 29 mmol/L    Anion Gap 11 7 - 15  mmol/L    Urea Nitrogen 22.6 8.0 - 23.0 mg/dL    Creatinine 1.15 0.67 - 1.17 mg/dL    GFR Estimate 68 >60 mL/min/1.73m2      Comment:      eGFR calculated using 2021 CKD-EPI equation.    Calcium 9.1 8.8 - 10.4 mg/dL      Comment:      Reference intervals for this test were updated on 7/16/2024 to reflect our healthy population more accurately. There may be differences in the flagging of prior results with similar values performed with this method. Those prior results can be interpreted in the context of the updated reference intervals.    Glucose 113 (H) 70 - 99 mg/dL    Patient Fasting > 8hrs? Unknown        If you have any questions or concerns, please call the clinic at the number listed above.       Sincerely,      Cintia Ortiz MD

## 2024-10-25 NOTE — PROGRESS NOTES
"  Assessment & Plan     Chronic gout of right foot, unspecified cause  Pt presenting with 2 days of acute gout flare after treatment for 1 month of gout flare by DPM 3 days ago.  He states the DPM gave him two pills of medication, but after he finished them his symptoms returned. He manages chronic gout with 300 mg allopurinol daily. On exam today, the 1st digit of the right foot is tender to palpation. No warmth or intense redness. Based on history, likely this is from acute gout flare that was not fully treated. Based on patients history of elevated creatinine, will avoid NSAIDs at this time. Recommended 7 day course of prednisone for flare. Encouraged to continue chronic gout medications. Labs obtained today for gout. Return in 3 months for follow up.   - Lipid Profile  - Uric acid  - Basic metabolic panel  - predniSONE (DELTASONE) 20 MG tablet  Dispense: 7 tablet; Refill: 0    Hepatic steatosis  - Lipid Profile    Encounter for influenza vaccination  INFLUENZA HIGH DOSE, TRIVALENT, PF (FLUZONE) (Completed)      BMI  Estimated body mass index is 25.34 kg/m  as calculated from the following:    Height as of this encounter: 1.567 m (5' 1.7\").    Weight as of this encounter: 62.2 kg (137 lb 3.2 oz).       No follow-ups on file.    Subjective   Cristina is a 73 year old, presenting for the following health issues:  Pain (Right toe pain, been going long time due to gout)        10/25/2024     2:39 PM   Additional Questions   Roomed by guille   Accompanied by self         10/25/2024    Information    services provided? Yes   Language Hmong   Type of interpretation provided Face-to-face    name Beatriz    [employer]     Cristina is a 73 year old male with a history of chronic gout who presents today for right first toe pain. He states the pain started in September and worsened until he saw a podiatry doctor about 3 days ago who gave him two pills for gout flare. He " "states that is symptoms improved initially with the medication, then worsened over the past two days. Overall, the inflammation and redness has improved but the pain is still throbbing and moved from the bottom of his toe to mid toe. He manages chronic gout with allopurinol and takes this every day with no issues.       Objective    /67 (BP Location: Left arm, Patient Position: Sitting, Cuff Size: Adult Regular)   Pulse 66   Temp 97.6  F (36.4  C) (Oral)   Resp 20   Ht 1.567 m (5' 1.7\")   Wt 62.2 kg (137 lb 3.2 oz)   SpO2 99%   BMI 25.34 kg/m    Body mass index is 25.34 kg/m .  Physical Exam   GENERAL: alert and no distress  MS: no gross musculoskeletal defects noted, no edema  FOOT: right foot exam- over the first metatarsal there is a 2cm area of darkened skin. Mid phalanx there is 1cm pale pink area that is tender to palpation. No redness or swelling of the toe. Temperature is normal. Over every toe there is skin scaling and crusting.           Dinorah Hanna, MS3  [facility]      Signed Electronically by: Cintia Ortiz MD    "

## 2024-10-26 LAB
ANION GAP SERPL CALCULATED.3IONS-SCNC: 11 MMOL/L (ref 7–15)
BUN SERPL-MCNC: 22.6 MG/DL (ref 8–23)
CALCIUM SERPL-MCNC: 9.1 MG/DL (ref 8.8–10.4)
CHLORIDE SERPL-SCNC: 102 MMOL/L (ref 98–107)
CHOLEST SERPL-MCNC: 190 MG/DL
CREAT SERPL-MCNC: 1.15 MG/DL (ref 0.67–1.17)
EGFRCR SERPLBLD CKD-EPI 2021: 68 ML/MIN/1.73M2
FASTING STATUS PATIENT QL REPORTED: ABNORMAL
FASTING STATUS PATIENT QL REPORTED: ABNORMAL
GLUCOSE SERPL-MCNC: 113 MG/DL (ref 70–99)
HCO3 SERPL-SCNC: 24 MMOL/L (ref 22–29)
HDLC SERPL-MCNC: 54 MG/DL
LDLC SERPL CALC-MCNC: 91 MG/DL
NONHDLC SERPL-MCNC: 136 MG/DL
POTASSIUM SERPL-SCNC: 4.6 MMOL/L (ref 3.4–5.3)
SODIUM SERPL-SCNC: 137 MMOL/L (ref 135–145)
TRIGL SERPL-MCNC: 224 MG/DL
URATE SERPL-MCNC: 7.6 MG/DL (ref 3.4–7)

## 2024-10-27 NOTE — PROGRESS NOTES
Preceptor Attestation:  I discussed the patient with the resident and evaluated the patient in person. I have verified the content of the note, which accurately reflects my assessment of the patient and the plan of care.  Supervising Physician:  Cintia Ortiz MD.    Answers submitted by the patient for this visit:  Patient Health Questionnaire (Submitted on 10/25/2024)  If you checked off any problems, how difficult have these problems made it for you to do your work, take care of things at home, or get along with other people?: Not difficult at all  PHQ9 TOTAL SCORE: 2

## 2024-10-28 NOTE — RESULT ENCOUNTER NOTE
Cristina Lyles-    It was nice to see you in clinic.  Here is a copy of your lab results.  Your kidney tests are stable and look good.  Your blood sugar looks okay.  Your cholesterol levels are excellent.  Your uric acid level is a little high, which may be why you had this gout flare.  I would like to increase your allopurinol (daily gout medication).  Please continue to take the 300mg of allopurinol.  Also start taking an additional 100mg of allopurinol for a total dose of 400mg allopurinol.  I hope the prednisone is also helping with the pain and swelling.  Please call the clinic at [phone] if you have any questions.      Cintia Ortiz MD    [facility], would you be able to call patient and discuss these new recommendations with the patient in [facility]?  Otherwise I will see if Puma can call.  Thank you!    Also send copy by mail.

## 2024-11-23 DIAGNOSIS — M1A.0710 CHRONIC GOUT OF RIGHT FOOT, UNSPECIFIED CAUSE: Primary | ICD-10-CM

## 2024-11-27 ENCOUNTER — ANCILLARY PROCEDURE (OUTPATIENT)
Dept: GENERAL RADIOLOGY | Facility: CLINIC | Age: 74
End: 2024-11-27
Attending: STUDENT IN AN ORGANIZED HEALTH CARE EDUCATION/TRAINING PROGRAM
Payer: COMMERCIAL

## 2024-11-27 DIAGNOSIS — M79.672 LEFT FOOT PAIN: ICD-10-CM

## 2024-11-27 PROCEDURE — 73650 X-RAY EXAM OF HEEL: CPT | Mod: TC | Performed by: RADIOLOGY

## 2025-03-03 ENCOUNTER — OFFICE VISIT (OUTPATIENT)
Dept: FAMILY MEDICINE | Facility: CLINIC | Age: 75
End: 2025-03-03
Payer: COMMERCIAL

## 2025-03-03 VITALS
BODY MASS INDEX: 26.72 KG/M2 | OXYGEN SATURATION: 97 % | SYSTOLIC BLOOD PRESSURE: 132 MMHG | TEMPERATURE: 97.8 F | HEIGHT: 62 IN | HEART RATE: 94 BPM | RESPIRATION RATE: 16 BRPM | WEIGHT: 145.2 LBS | DIASTOLIC BLOOD PRESSURE: 74 MMHG

## 2025-03-03 DIAGNOSIS — H93.8X2 IRRITATION OF LEFT EAR: Primary | ICD-10-CM

## 2025-03-03 RX ORDER — FLUTICASONE PROPIONATE 50 MCG
1 SPRAY, SUSPENSION (ML) NASAL DAILY
Qty: 15.8 ML | Refills: 0 | Status: SHIPPED | OUTPATIENT
Start: 2025-03-03

## 2025-03-03 RX ORDER — HYDROCORTISONE AND ACETIC ACID 20.75; 10.375 MG/ML; MG/ML
3 SOLUTION AURICULAR (OTIC) 2 TIMES DAILY
Qty: 10 ML | Refills: 0 | Status: SHIPPED | OUTPATIENT
Start: 2025-03-03

## 2025-03-03 NOTE — PROGRESS NOTES
Preceptor Attestation:    I discussed the patient with the resident and evaluated the patient in person. I have verified the content of the note, which accurately reflects my assessment of the patient and the plan of care.   Supervising Physician:  Oskar Trujillo MD.

## 2025-03-03 NOTE — PROGRESS NOTES
"  Assessment & Plan     Irritation of left ear  No signs of acute otitis media.  Discussed recommendations of not using Q-tips or putting anything in the ear for itching.  Discussed that this could be related to allergies with possible improvement using Flonase which the patient was interested in trying.  - Return to clinic if no improvements or worsening in the next week  - Recommended revisiting ENT for another hearing test  - acetic acid-hydrocortisone (VOSOL-HC) 1-2 % otic solution; Place 3 drops Into the left ear 2 times daily for 7 days.  - fluticasone (FLONASE) 50 MCG/ACT nasal spray; Spray 1 spray into both nostrils daily.    Subjective   Cristina is a 74 year old, presenting for the following health issues:  Ear Problem (Previously rings in the ear, last few day feels warm in the ear along with pain, left ear)      3/3/2025     2:46 PM   Additional Questions   Roomed by Dina   Accompanied by patient         3/3/2025    Information    services provided? Yes   Language [facility]   Type of interpretation provided Face-to-face    name Deja Dat    [employer]     HPI    Burning sensation/plugged in left ear for the past two days. Decreased hearing in left ear for more than 2 days. Has a hearing aid but the battery has  so not using it currently. Has had a cough for the past few days. No fever. No recent illness. Throbbing pain in ear feels better when massaging on outside. No change in pain for the past few days. No hx of ear infections. No drainage from ears. Itching in ear.  Uses Q-tips.        Objective    /74 (BP Location: Left arm, Patient Position: Sitting, Cuff Size: Adult Regular)   Pulse 94   Temp 97.8  F (36.6  C) (Oral)   Resp 16   Ht 1.562 m (5' 1.5\")   Wt 65.9 kg (145 lb 3.2 oz)   SpO2 97%   BMI 26.99 kg/m    Body mass index is 26.99 kg/m .  Physical Exam   GENERAL: alert and no acute distress  EARS: right ear canal erythematous, Tms grey and " translucent, no bulging. Left ear canal erythematous, Tms grey and translucent, no bulging.    MOUTH: tonsils non-enlarged, no tonsillar exudate  RESP: no respiratory distress, breathing comfortably   PSYCH: mentation appears normal, affect normal/bright     Signed Electronically by: Ellyn Spears MD

## 2025-04-09 ENCOUNTER — TRANSFERRED RECORDS (OUTPATIENT)
Dept: HEALTH INFORMATION MANAGEMENT | Facility: CLINIC | Age: 75
End: 2025-04-09
Payer: COMMERCIAL